# Patient Record
Sex: FEMALE | Race: ASIAN | NOT HISPANIC OR LATINO | Employment: UNEMPLOYED | URBAN - METROPOLITAN AREA
[De-identification: names, ages, dates, MRNs, and addresses within clinical notes are randomized per-mention and may not be internally consistent; named-entity substitution may affect disease eponyms.]

---

## 2017-03-22 ENCOUNTER — TELEPHONE (OUTPATIENT)
Dept: ENDOCRINOLOGY | Facility: CLINIC | Age: 42
End: 2017-03-22

## 2017-03-22 NOTE — TELEPHONE ENCOUNTER
Refill request rec'd, pt last seen 3/2015 pt contacted and stated she is seeing new provider in her area. Pharmacy notified to send to current provider Dr. Conteh

## 2017-03-22 NOTE — TELEPHONE ENCOUNTER
ACCU-CHEK NIKIA PLUS TEST STRP         Last Written Prescription Date: 3/27/2015  Last Fill Quantity: 3 box, # refills: 11 refills  Last Office Visit with [facility], P or [facility] prescribing provider: 3/27/2015       BP Readings from Last 3 Encounters:   03/27/15 119/80   12/19/14 119/71   10/02/14 131/82     Lab Results   Component Value Date    MICROL 1240 03/27/2015     No results found for: MICROALBUMIN  Creatinine   Date Value Ref Range Status   03/27/2015 0.52 0.52 - 1.04 mg/dL Final   ]  GFR Estimate   Date Value Ref Range Status   03/27/2015 >90  Non  GFR Calc   >60 mL/min/1.7m2 Final   09/26/2014 >90  Non  GFR Calc   >60 mL/min/1.7m2 Final   03/21/2014 >90 >60 mL/min/1.7m2 Final     GFR Estimate If Black   Date Value Ref Range Status   03/27/2015 >90   GFR Calc   >60 mL/min/1.7m2 Final   09/26/2014 >90   GFR Calc   >60 mL/min/1.7m2 Final   03/21/2014 >90 >60 mL/min/1.7m2 Final     Lab Results   Component Value Date    CHOL 261 03/27/2015     Lab Results   Component Value Date    HDL 33 03/27/2015     Lab Results   Component Value Date    LDL  03/27/2015     Cannot estimate LDL when triglyceride exceeds 400 mg/dL     Lab Results   Component Value Date    TRIG 758 03/27/2015     Lab Results   Component Value Date    CHOLHDLRATIO 7.9 03/27/2015     Lab Results   Component Value Date    AST 31 12/07/2012     Lab Results   Component Value Date    ALT 34 12/07/2012     Lab Results   Component Value Date    A1C 7.6 10/06/2013    A1C 7.5 05/03/2013    A1C 7.1 03/08/2013    A1C 6.8 12/14/2012    A1C 7.2 11/16/2012     Potassium   Date Value Ref Range Status   03/27/2015 3.5 3.4 - 5.3 mmol/L Final

## 2017-03-23 RX ORDER — BLOOD SUGAR DIAGNOSTIC
STRIP MISCELLANEOUS
Start: 2017-03-23

## 2017-03-23 NOTE — TELEPHONE ENCOUNTER
Routing refill request to provider for review/approval because:  Patient needs to be seen because it has been more than 1 year since last office visit.    Santa VIZCARRA RN, BSN

## 2017-04-05 ENCOUNTER — TELEPHONE (OUTPATIENT)
Dept: ENDOCRINOLOGY | Facility: CLINIC | Age: 42
End: 2017-04-05

## 2017-04-05 DIAGNOSIS — E11.9 TYPE 2 DIABETES MELLITUS WITHOUT COMPLICATION, UNSPECIFIED LONG TERM INSULIN USE STATUS: Primary | ICD-10-CM

## 2017-04-05 NOTE — TELEPHONE ENCOUNTER
Pt wants return visit - no longer on insulin - will send script for meter and test strips to local pharmacy and have her scheduled for return appt

## 2017-10-15 ENCOUNTER — HEALTH MAINTENANCE LETTER (OUTPATIENT)
Age: 42
End: 2017-10-15

## 2017-12-10 ENCOUNTER — TRANSFERRED RECORDS (OUTPATIENT)
Dept: HEALTH INFORMATION MANAGEMENT | Facility: CLINIC | Age: 42
End: 2017-12-10

## 2018-01-23 ENCOUNTER — OFFICE VISIT (OUTPATIENT)
Dept: URGENT CARE | Facility: URGENT CARE | Age: 43
End: 2018-01-23
Payer: COMMERCIAL

## 2018-01-23 DIAGNOSIS — E11.65 UNCONTROLLED TYPE 2 DIABETES MELLITUS WITH CHRONIC KIDNEY DISEASE, WITH LONG-TERM CURRENT USE OF INSULIN, UNSPECIFIED CKD STAGE: ICD-10-CM

## 2018-01-23 DIAGNOSIS — R10.9 ACUTE LEFT FLANK PAIN: Primary | ICD-10-CM

## 2018-01-23 DIAGNOSIS — R30.0 DYSURIA: ICD-10-CM

## 2018-01-23 DIAGNOSIS — E11.22 UNCONTROLLED TYPE 2 DIABETES MELLITUS WITH CHRONIC KIDNEY DISEASE, WITH LONG-TERM CURRENT USE OF INSULIN, UNSPECIFIED CKD STAGE: ICD-10-CM

## 2018-01-23 DIAGNOSIS — D64.9 ANEMIA, UNSPECIFIED TYPE: ICD-10-CM

## 2018-01-23 DIAGNOSIS — R80.9 PROTEINURIA, UNSPECIFIED TYPE: ICD-10-CM

## 2018-01-23 DIAGNOSIS — Z79.4 UNCONTROLLED TYPE 2 DIABETES MELLITUS WITH CHRONIC KIDNEY DISEASE, WITH LONG-TERM CURRENT USE OF INSULIN, UNSPECIFIED CKD STAGE: ICD-10-CM

## 2018-01-23 LAB
ALBUMIN UR-MCNC: >=300 MG/DL
ANISOCYTOSIS BLD QL SMEAR: SLIGHT
APPEARANCE UR: ABNORMAL
BACTERIA #/AREA URNS HPF: ABNORMAL /HPF
BASOPHILS # BLD AUTO: 0.2 10E9/L (ref 0–0.2)
BASOPHILS NFR BLD AUTO: 1 %
BILIRUB UR QL STRIP: NEGATIVE
COLOR UR AUTO: YELLOW
DIFFERENTIAL METHOD BLD: ABNORMAL
ELLIPTOCYTES BLD QL SMEAR: ABNORMAL
EOSINOPHIL # BLD AUTO: 0.2 10E9/L (ref 0–0.7)
EOSINOPHIL NFR BLD AUTO: 1 %
ERYTHROCYTE [DISTWIDTH] IN BLOOD BY AUTOMATED COUNT: 17.3 % (ref 10–15)
GLUCOSE UR STRIP-MCNC: >=1000 MG/DL
HCT VFR BLD AUTO: 31.2 % (ref 35–47)
HGB BLD-MCNC: 8.7 G/DL (ref 11.7–15.7)
HGB UR QL STRIP: ABNORMAL
KETONES UR STRIP-MCNC: NEGATIVE MG/DL
LEUKOCYTE ESTERASE UR QL STRIP: NEGATIVE
LYMPHOCYTES # BLD AUTO: 2.1 10E9/L (ref 0.8–5.3)
LYMPHOCYTES NFR BLD AUTO: 13 %
MCH RBC QN AUTO: 17.5 PG (ref 26.5–33)
MCHC RBC AUTO-ENTMCNC: 27.9 G/DL (ref 31.5–36.5)
MCV RBC AUTO: 63 FL (ref 78–100)
MONOCYTES # BLD AUTO: 1.1 10E9/L (ref 0–1.3)
MONOCYTES NFR BLD AUTO: 7 %
NEUTROPHILS # BLD AUTO: 12.7 10E9/L (ref 1.6–8.3)
NEUTROPHILS NFR BLD AUTO: 78 %
NITRATE UR QL: NEGATIVE
NON-SQ EPI CELLS #/AREA URNS LPF: ABNORMAL /LPF
PH UR STRIP: 6 PH (ref 5–7)
PLATELET # BLD AUTO: 384 10E9/L (ref 150–450)
PLATELET # BLD EST: ABNORMAL 10*3/UL
POIKILOCYTOSIS BLD QL SMEAR: SLIGHT
RBC # BLD AUTO: 4.96 10E12/L (ref 3.8–5.2)
RBC #/AREA URNS AUTO: ABNORMAL /HPF
RBC INCLUSIONS BLD: SLIGHT
SOURCE: ABNORMAL
SP GR UR STRIP: 1.01 (ref 1–1.03)
UROBILINOGEN UR STRIP-ACNC: 0.2 EU/DL (ref 0.2–1)
WBC # BLD AUTO: 16.3 10E9/L (ref 4–11)
WBC #/AREA URNS AUTO: ABNORMAL /HPF
WBC CLUMPS #/AREA URNS HPF: PRESENT /HPF

## 2018-01-23 PROCEDURE — 99214 OFFICE O/P EST MOD 30 MIN: CPT | Performed by: PHYSICIAN ASSISTANT

## 2018-01-23 PROCEDURE — 85025 COMPLETE CBC W/AUTO DIFF WBC: CPT | Performed by: PHYSICIAN ASSISTANT

## 2018-01-23 PROCEDURE — 81001 URINALYSIS AUTO W/SCOPE: CPT | Performed by: PHYSICIAN ASSISTANT

## 2018-01-23 PROCEDURE — 36415 COLL VENOUS BLD VENIPUNCTURE: CPT | Performed by: PHYSICIAN ASSISTANT

## 2018-01-23 PROCEDURE — 87086 URINE CULTURE/COLONY COUNT: CPT | Performed by: PHYSICIAN ASSISTANT

## 2018-01-23 NOTE — MR AVS SNAPSHOT
After Visit Summary   1/23/2018    Jesus Alberto Denson    MRN: [id number]           Patient Information     Date Of Birth          1975        Visit Information        Provider Department      1/23/2018 5:35 PM Anamaria Paz PA-C [facility]        Today's Diagnoses     Acute left flank pain    -  1       Follow-ups after your visit        Who to contact     If you have questions or need follow up information about today's clinic visit or your schedule please contact [facility] directly at [phone].  Normal or non-critical lab and imaging results will be communicated to you by MyChart, letter or phone within 4 business days after the clinic has received the results. If you do not hear from us within 7 days, please contact the clinic through O2 Secure Wirelesst or phone. If you have a critical or abnormal lab result, we will notify you by phone as soon as possible.  Submit refill requests through Tapstream or call your pharmacy and they will forward the refill request to us. Please allow 3 business days for your refill to be completed.          Additional Information About Your Visit        MyChart Information     Tapstream gives you secure access to your electronic health record. If you see a primary care provider, you can also send messages to your care team and make appointments. If you have questions, please call your primary care clinic.  If you do not have a primary care provider, please call [phone] and they will assist you.        Care EveryWhere ID     This is your Care EveryWhere ID. This could be used by other organizations to access your [facility] medical records  [id number]        Your Vitals Were     Pulse Temperature Pulse Oximetry BMI (Body Mass Index)          112 100.7  F (38.2  C) (Oral) 100% 26.6 kg/m2         Blood Pressure from Last 3 Encounters:   01/23/18 136/85   03/27/15 119/80   12/19/14 119/71    Weight from Last 3 Encounters:   01/23/18 140 lb 12.8 oz  (63.9 kg)   03/27/15 162 lb (73.5 kg)   12/19/14 164 lb (74.4 kg)              We Performed the Following     CBC with platelets differential     UA with Microscopic reflex to Culture        Primary Care Provider Office Phone # Fax #    Edilia iXe [phone] [address] [place] [address]        Equal Access to Services     [facility] Center[facility] : Hadii aad ku hadasho Soomaali, waaxda luqadaha, qaybta kaalmada adeegyada, waxay idiin hayaan adeeg kharash la'aan ah. So [facility] [phone].    ATENCIÓN: Si habla espcorona, tiene a mcdowell disposición servicios gratuitos de asistencia lingüística. Llame al [phone].    We comply with applicable federal civil rights laws and [place] laws. We do not discriminate on the basis of race, color, national origin, age, disability, sex, sexual orientation, or gender identity.            Thank you!     Thank you for choosing [facility]  for your care. Our goal is always to provide you with excellent care. Hearing back from our patients is one way we can continue to improve our services. Please take a few minutes to complete the written survey that you may receive in the mail after your visit with us. Thank you!             Your Updated Medication List - Protect others around you: Learn how to safely use, store and throw away your medicines at www.disposemymeds.org.          This list is accurate as of: 1/23/18  7:18 PM.  Always use your most recent med list.                   Brand Name Dispense Instructions for use Diagnosis    blood glucose monitoring lancets     2 Box    Use to test blood sugar 2 times daily    Diabetes mellitus, type 2 (H)       blood glucose monitoring test strip    no brand specified    3 Box    Pt to use twice daily, 5 times per week  - accucheck aster    Type 2 diabetes mellitus without complication, unspecified long term insulin use status (H)       cholecalciferol 24014 UNITS capsule    VITAMIN D3    12 capsule     Take 1 capsule (50,000 Units) by mouth once a week    Unspecified vitamin D deficiency       ferrous sulfate 325 (65 FE) MG tablet    IRON    180 tablet    1 tablet twice daily    Type 2 diabetes, HbA1C goal < 8% (H)       HumaLOG MIX 75/25 KWIKPEN injection   Generic drug:  insulin lispro prot & lispro     60 mL    Pt to take 35 units in AM before breakfast and 18 units prior to supper    Type 2 diabetes, uncontrolled, with renal manifestation (H)       insulin pen needle 31G X 5 MM     200 each    2 times per day    Type 2 diabetes, uncontrolled, with renal manifestation (H)

## 2018-01-24 ENCOUNTER — TELEPHONE (OUTPATIENT)
Dept: FAMILY MEDICINE | Facility: CLINIC | Age: 43
End: 2018-01-24

## 2018-01-24 VITALS
OXYGEN SATURATION: 100 % | WEIGHT: 140.8 LBS | TEMPERATURE: 100.7 F | HEART RATE: 102 BPM | BODY MASS INDEX: 26.6 KG/M2 | SYSTOLIC BLOOD PRESSURE: 136 MMHG | DIASTOLIC BLOOD PRESSURE: 85 MMHG

## 2018-01-24 DIAGNOSIS — N20.0 CALCULUS OF KIDNEY: Primary | ICD-10-CM

## 2018-01-24 LAB
BACTERIA SPEC CULT: NORMAL
SPECIMEN SOURCE: NORMAL

## 2018-01-24 ASSESSMENT — ENCOUNTER SYMPTOMS
FEVER: 1
EYE PAIN: 0
HEMOPTYSIS: 0
DYSURIA: 1
FREQUENCY: 1
DIAPHORESIS: 0
FLANK PAIN: 1
HEMATURIA: 0
CARDIOVASCULAR NEGATIVE: 1
WEIGHT LOSS: 0
PALPITATIONS: 0
RESPIRATORY NEGATIVE: 1
COUGH: 0

## 2018-01-24 NOTE — PROGRESS NOTES
SUBJECTIVE:                                                         LENORE Denson is a 42 year old female who presents to clinic today:  URINARY TRACT SYMPTOMS    Duration: 2days ago    Description:  Dysuria, frequency, urgency and hesitancy.  No hematuria    Intensity:  moderate    Accompanying signs and symptoms:  Fever/chills: Yes, fevers   Flank pain: Yes, L side low back.  Describes some radiation in to her LLE but no numbness, tingling or weakness.  No bladder or bowel dysfunction.  No swelling, redness, drainage or fevers.  No injury or trauma.    Nausea and vomiting: no   Vaginal symptoms: No vaginal d/c, bleeding, and irritation.  No new partners.   Abdominal/Pelvic Pain: No abdominal pain, n/v, constipation, diarrhea, bloody or black tarry stools.  No URI symptoms.    History  History of frequent UTI's: None  History of kidney stones: no   Sexually Active: no   Possibility of pregnancy: No    Precipitating or alleviating factors: None    Therapies tried and outcome: fluids without any relief.  Has known hx of poorly controlled diabetes and CKD currently on insulin      Reviewed PMH.  Patient Active Problem List   Diagnosis     Obesity     Hyperlipidemia with target LDL less than 100     CKD (chronic kidney disease) stage 1, GFR 90 ml/min or greater     Type 2 diabetes, uncontrolled, with renal manifestation (H)     Anemia     Vitamin D deficiency     Proteinuria     Iron deficiency     Current Outpatient Prescriptions   Medication Sig Dispense Refill     blood glucose monitoring (NO BRAND SPECIFIED) test strip Pt to use twice daily, 5 times per week  - accucheck aster 3 Box 11     HUMALOG MIX 75/25 KWIKPEN (75-25) 100 UNIT/ML susp Pt to take 35 units in AM before breakfast and 18 units prior to supper 60 mL 3     blood glucose monitoring (ACCU-CHEK FASTCLIX) lancets Use to test blood sugar 2 times daily 2 Box 3     insulin pen needle 31G X 5 MM 2 times per day 200 each 3     cholecalciferol (VITAMIN  D3) 11471 UNITS capsule Take 1 capsule (50,000 Units) by mouth once a week 12 capsule 0     ferrous sulfate (IRON) 325 (65 FE) MG tablet 1 tablet twice daily (Patient not taking: Reported on 1/23/2018) 180 tablet 1     Allergies   Allergen Reactions     Nkda [No Known Drug Allergies]        Review of Systems   Constitutional: Positive for fever. Negative for diaphoresis and weight loss.   HENT: Negative.    Eyes: Negative for pain.   Respiratory: Negative.  Negative for cough and hemoptysis.    Cardiovascular: Negative.  Negative for chest pain and palpitations.   Genitourinary: Positive for dysuria, flank pain, frequency and urgency. Negative for hematuria.   Skin: Negative.    Endo/Heme/Allergies: Negative for environmental allergies.   All other systems reviewed and are negative.      /85  Pulse 102  Temp 100.7  F (38.2  C) (Oral)  Wt 140 lb 12.8 oz (63.9 kg)  SpO2 100%  BMI 26.6 kg/m2  Physical Exam   Constitutional: She is oriented to person, place, and time and well-developed, well-nourished, and in no distress. No distress.   Cardiovascular: Normal rate, regular rhythm, normal heart sounds and intact distal pulses.  Exam reveals no gallop and no friction rub.    No murmur heard.  Pulmonary/Chest: Effort normal and breath sounds normal. No respiratory distress. She has no wheezes. She has no rales.   Abdominal: Soft. Normal appearance, normal aorta and bowel sounds are normal. She exhibits no mass. There is no hepatosplenomegaly. There is tenderness (L sided flank pain). There is rebound and guarding. There is no CVA tenderness, no tenderness at McBurney's point and negative Marinelli's sign. No hernia.   Musculoskeletal:        Lumbar back: She exhibits normal range of motion, no tenderness, no bony tenderness, no swelling, no edema, no deformity, no laceration, no spasm and normal pulse.   Neurological: She is alert and oriented to person, place, and time. She has normal motor skills, normal  sensation, normal strength and normal reflexes. She has a normal Straight Leg Raise Test. Gait normal. Gait normal.   Skin: Skin is warm, dry and intact.   Distal pulses are 2+ and symmetric.  No peripheral edema.   Psychiatric: Mood, memory, affect and judgment normal.   Nursing note and vitals reviewed.  UA RESULTS:  Recent Labs   Lab Test  01/23/18   1906   COLOR  Yellow   APPEARANCE  Slightly Cloudy   URINEGLC  >=1000*   URINEBILI  Negative   URINEKETONE  Negative   SG  1.015   UBLD  Small*   URINEPH  6.0   PROTEIN  >=300*   UROBILINOGEN  0.2   NITRITE  Negative   LEUKEST  Negative   RBCU  O - 2   WBCU  5-10*, clumps         Assessment/Plan:  Acute left flank pain:  WBC is elevated at 16.3 with left shift.  UA and micro are abnormal and is suspicious for pyelonephritis vs UTI vs sepsis vs kidney stones/obstructive uropathy.  Due to her complicated medical hx of poorly controlled diabetes and CKD, I recommended further evaluation and management in the ER.  Will most likely need further workup with labs, imaging, and IV antibiotics.  Patient has declined transportation via ambulance and will have family drive her.  Understands risks and benefits of ambulance transfer and she has declined.  She is stable in clinic.  Call 911 if worsening symptoms.  [facility] has been notified.  F/u with PCP after ER visit.     -     CBC with platelets differential  -     UA with Microscopic reflex to Culture    Anemia:  Hgb has dropped to 8.7 with microcytic anemia (baseline looks to be 9-10).  ?secondary to CKD.  Will have them manage in the ER as well.      Dysuria  -     Urine Culture Aerobic Bacterial    Uncontrolled type 2 diabetes mellitus with chronic kidney disease, with long-term current use of insulin, unspecified CKD stage (H)    Proteinuria, unspecified type          Anamaria See PATIENCE Paz      Chart reviewed.  Encounter was reviewed with provider after the visit.  Patient was not examined by me.  Syeda Rich,  M.D.

## 2018-01-24 NOTE — TELEPHONE ENCOUNTER
Called to check on patient's status.  Was referred to the ER last night due to concern for pyelo vs kidney stones.  Was found to have a kidney stone on her L side and ESLW was recommended which patient has refused at this time.  Would like to try herbal and conservative treatments.  Was started on cipro 500mg twice daily X2weeks with good improvement as well.  Increase fluids and will send to urology for further evaluation and management.  F/u with PCP in 1week.    Anamaria Paz PA-C

## 2018-02-07 ENCOUNTER — OFFICE VISIT (OUTPATIENT)
Dept: FAMILY MEDICINE | Facility: CLINIC | Age: 43
End: 2018-02-07
Payer: COMMERCIAL

## 2018-02-07 ENCOUNTER — TELEPHONE (OUTPATIENT)
Dept: FAMILY MEDICINE | Facility: CLINIC | Age: 43
End: 2018-02-07

## 2018-02-07 VITALS
HEART RATE: 87 BPM | DIASTOLIC BLOOD PRESSURE: 75 MMHG | SYSTOLIC BLOOD PRESSURE: 125 MMHG | WEIGHT: 140 LBS | TEMPERATURE: 97.1 F | OXYGEN SATURATION: 100 % | BODY MASS INDEX: 26.45 KG/M2

## 2018-02-07 DIAGNOSIS — E11.22 UNCONTROLLED TYPE 2 DIABETES MELLITUS WITH CHRONIC KIDNEY DISEASE, WITH LONG-TERM CURRENT USE OF INSULIN, UNSPECIFIED CKD STAGE: Primary | ICD-10-CM

## 2018-02-07 DIAGNOSIS — N18.1 CKD (CHRONIC KIDNEY DISEASE) STAGE 1, GFR 90 ML/MIN OR GREATER: ICD-10-CM

## 2018-02-07 DIAGNOSIS — Z79.4 UNCONTROLLED TYPE 2 DIABETES MELLITUS WITH CHRONIC KIDNEY DISEASE, WITH LONG-TERM CURRENT USE OF INSULIN, UNSPECIFIED CKD STAGE: Primary | ICD-10-CM

## 2018-02-07 DIAGNOSIS — E11.65 UNCONTROLLED TYPE 2 DIABETES MELLITUS WITH CHRONIC KIDNEY DISEASE, WITH LONG-TERM CURRENT USE OF INSULIN, UNSPECIFIED CKD STAGE: Primary | ICD-10-CM

## 2018-02-07 DIAGNOSIS — E78.5 HYPERLIPIDEMIA WITH TARGET LDL LESS THAN 100: ICD-10-CM

## 2018-02-07 DIAGNOSIS — N20.0 CALCULUS OF KIDNEY: ICD-10-CM

## 2018-02-07 LAB
ALBUMIN SERPL-MCNC: 3.2 G/DL (ref 3.4–5)
ANION GAP SERPL CALCULATED.3IONS-SCNC: 9 MMOL/L (ref 3–14)
BUN SERPL-MCNC: 13 MG/DL (ref 7–30)
CALCIUM SERPL-MCNC: 8.8 MG/DL (ref 8.5–10.1)
CHLORIDE SERPL-SCNC: 95 MMOL/L (ref 94–109)
CHOLEST SERPL-MCNC: 193 MG/DL
CO2 SERPL-SCNC: 26 MMOL/L (ref 20–32)
CREAT SERPL-MCNC: 0.55 MG/DL (ref 0.52–1.04)
CREAT UR-MCNC: 18 MG/DL
GFR SERPL CREATININE-BSD FRML MDRD: >90 ML/MIN/1.7M2
GLUCOSE SERPL-MCNC: 379 MG/DL (ref 70–99)
HBA1C MFR BLD: 11.3 % (ref 4.3–6)
HDLC SERPL-MCNC: 35 MG/DL
LDLC SERPL CALC-MCNC: 84 MG/DL
MICROALBUMIN UR-MCNC: 127 MG/L
MICROALBUMIN/CREAT UR: 721.59 MG/G CR (ref 0–25)
NONHDLC SERPL-MCNC: 158 MG/DL
PHOSPHATE SERPL-MCNC: 2.9 MG/DL (ref 2.5–4.5)
POTASSIUM SERPL-SCNC: 3.7 MMOL/L (ref 3.4–5.3)
SODIUM SERPL-SCNC: 130 MMOL/L (ref 133–144)
TRIGL SERPL-MCNC: 369 MG/DL

## 2018-02-07 PROCEDURE — 82043 UR ALBUMIN QUANTITATIVE: CPT | Performed by: INTERNAL MEDICINE

## 2018-02-07 PROCEDURE — 83036 HEMOGLOBIN GLYCOSYLATED A1C: CPT | Performed by: INTERNAL MEDICINE

## 2018-02-07 PROCEDURE — 80061 LIPID PANEL: CPT | Performed by: INTERNAL MEDICINE

## 2018-02-07 PROCEDURE — 99214 OFFICE O/P EST MOD 30 MIN: CPT | Performed by: INTERNAL MEDICINE

## 2018-02-07 PROCEDURE — 80069 RENAL FUNCTION PANEL: CPT | Performed by: INTERNAL MEDICINE

## 2018-02-07 PROCEDURE — 36415 COLL VENOUS BLD VENIPUNCTURE: CPT | Performed by: INTERNAL MEDICINE

## 2018-02-07 NOTE — TELEPHONE ENCOUNTER
Left message on voicemail for patient to call back.   Direct number given to call back: [phone].     Yi Ness RN

## 2018-02-07 NOTE — MR AVS SNAPSHOT
After Visit Summary   2/7/2018    Jesus Alberto Denson    MRN: [id number]           Patient Information     Date Of Birth          1975        Visit Information        Provider Department      2/7/2018 9:20 AM Chata Giordano MD [facility]        Today's Diagnoses     Uncontrolled type 2 diabetes mellitus with chronic kidney disease, with long-term current use of insulin, unspecified CKD stage (H)    -  1    CKD (chronic kidney disease) stage 1, GFR 90 ml/min or greater        Hyperlipidemia with target LDL less than 100        Calculus of kidney           Follow-ups after your visit        Additional Services     DIABETES EDUCATOR REFERRAL       DIABETES SELF MANAGEMENT TRAINING (DSMT)      Your provider has referred you to Diabetes Education: [facility]: Diabetes Education - All [facility] [phone]   [email or web address]     If an urgent visit is needed or A1C is above 12, Care Team to call the Diabetes  Education Team at [phone] or send an In Basket message to the Diabetes Education Pool (P DIAB ED-PATIENT CARE).    A  will call you to make your appointment. If it has been more than 3 business days since your referral was placed, please call the above phone number to schedule.    Type of training and number of hours: Previous Diagnosis: Follow-up DSMT - 2 hours.    Medicare covers: 10 hours of initial DSMT in 12 month period from the time of first visit, plus 2 hours of follow-up DSMT annually, and additional hours as requested for insulin training.    Diabetes Type: Type 2 , had been on insulin, stopped taking any meds for two years.  Restarting on oral meds now.               Diabetes Co-Morbidities: none               A1C Goal:  <7.0       A1C is: Lab Results       Component                Value               Date                       A1C                      7.6                 10/06/2013              Diabetes  Education Topics: Comprehensive Knowledge Assessment and Instruction    Special Educational Needs Requiring Individual DSMT: None       MEDICAL NUTRITION THERAPY (MNT) for Diabetes    Medical Nutrition Therapy with a Registered Dietitian can be provided in coordination with Diabetes Self-Management Training to assist in achieving optimal diabetes management.    MNT Type and Hours: Previous diagnosis: Annual follow-up MNT - 2 hours                       Medicare will cover: 3 hours initial MNT in 12 month period after first visit, plus 2 hours of follow-up MNT annually    Please be aware that coverage of these services is subject to the terms and limitations of your health insurance plan.  Call member services at your health plan to determine Diabetes Self-Management Training (Codes  &amp; ) and Medical Nutrition Therapy (Codes 30538 & 92482) benefits and ask which blood glucose monitor brands are covered by your plan.  Please bring the following with you to your appointment:    (1)  List of current medications   (2)  List of Blood Glucose Monitor brands that are covered by your insurance plan  (3)  Blood Glucose Monitor and log book  (4)   Food records for the 3 days prior to your visit    The Certified Diabetes Educator may make diabetes medication adjustments per the CDE Protocol and Collaborative Practice Agreement.            UROLOGY ADULT REFERRAL       Your provider has referred you to: [facility]: [facility] [phone]   [email or web address]    Please be aware that coverage of these services is subject to the terms and limitations of your health insurance plan.  Call member services at your health plan with any benefit or coverage questions.      Please bring the following with you to your appointment:    (1) Any X-Rays, CTs or MRIs which have been performed.  Contact the facility where they were done to arrange for  prior to your  scheduled appointment.    (2) List of current medications  (3) This referral request   (4) Any documents/labs given to you for this referral                  Follow-up notes from your care team     Return in about 1 week (around 2/14/2018) for follow up with primary doctor.      Who to contact     If you have questions or need follow up information about today's clinic visit or your schedule please contact [facility] directly at [phone].  Normal or non-critical lab and imaging results will be communicated to you by GREEhart, letter or phone within 4 business days after the clinic has received the results. If you do not hear from us within 7 days, please contact the clinic through Infinia or phone. If you have a critical or abnormal lab result, we will notify you by phone as soon as possible.  Submit refill requests through Infinia or call your pharmacy and they will forward the refill request to us. Please allow 3 business days for your refill to be completed.          Additional Information About Your Visit        Infinia Information     Infinia gives you secure access to your electronic health record. If you see a primary care provider, you can also send messages to your care team and make appointments. If you have questions, please call your primary care clinic.  If you do not have a primary care provider, please call [phone] and they will assist you.        Care EveryWhere ID     This is your Care EveryWhere ID. This could be used by other organizations to access your [place] medical records  [id number]        Your Vitals Were     Pulse Temperature Pulse Oximetry BMI (Body Mass Index)          87 97.1  F (36.2  C) (Oral) 100% 26.45 kg/m2         Blood Pressure from Last 3 Encounters:   02/07/18 125/75   01/23/18 136/85   03/27/15 119/80    Weight from Last 3 Encounters:   02/07/18 140 lb (63.5 kg)   01/23/18 140 lb 12.8 oz (63.9 kg)   03/27/15 162 lb (73.5 kg)              We Performed  the Following     Albumin Random Urine Quantitative with Creat Ratio     DIABETES EDUCATOR REFERRAL     Hemoglobin A1c     Lipid panel reflex to direct LDL Non-fasting     Renal panel     UROLOGY ADULT REFERRAL          Today's Medication Changes          These changes are accurate as of 2/7/18  9:48 AM.  If you have any questions, ask your nurse or doctor.               Start taking these medicines.        Dose/Directions    metFORMIN 1000 MG tablet   Commonly known as:  GLUCOPHAGE   Used for:  Uncontrolled type 2 diabetes mellitus with chronic kidney disease, with long-term current use of insulin, unspecified CKD stage (H)   Started by:  Chata Giordano MD        Dose:  1000 mg   Take 1 tablet (1,000 mg) by mouth 2 times daily (with meals)   Quantity:  60 tablet   Refills:  1       order for DME   Used for:  Uncontrolled type 2 diabetes mellitus with chronic kidney disease, with long-term current use of insulin, unspecified CKD stage (H)   Started by:  Chata Giordano MD        Equipment being ordered: glucometer with lancets and strips to check sugars tid   Quantity:  1 each   Refills:  1            Where to get your medicines      These medications were sent to [facility]/pharmacy #[id number] - [place], [place] - [address],  [address]  [address] [address]     Phone:  [phone]     metFORMIN 1000 MG tablet         Some of these will need a paper prescription and others can be bought over the counter.  Ask your nurse if you have questions.     Bring a paper prescription for each of these medications     order for DME                Primary Care Provider Office Phone # Fax #    Edilia Xie [phone] [address] [address]        Equal Access to Services     [facility] AH: Gema Valdes, warochelle luqadaha, qaybta kacuong marquis . So [facility]  [phone].    ATENCIÓN: Si harsh garcia, tiene a mcdowell disposición servicios gratuitos de asistencia lingüística. Amadeo olvera [phone].    We comply with applicable federal civil rights laws and [place] laws. We do not discriminate on the basis of race, color, national origin, age, disability, sex, sexual orientation, or gender identity.            Thank you!     Thank you for choosing [facility]  [facility] your [facility]. Our goal is always to provide you with excellent care. Hearing back from our patients is one way we can continue to improve our services. Please take a few minutes to complete the written survey that you may receive in the mail after your visit with us. Thank you!             Your Updated Medication List - Protect others around you: Learn how to safely use, store and throw away your medicines at www.disposemymeds.org.          This list is accurate as of 2/7/18  9:48 AM.  Always use your most recent med list.                   Brand Name Dispense Instructions for use Diagnosis    blood glucose monitoring lancets     2 Box    Use to test blood sugar 2 times daily    Diabetes mellitus, type 2 (H)       blood glucose monitoring test strip    no brand specified    3 Box    Pt to use twice daily, 5 times per week  - accucheck aster    Type 2 diabetes mellitus without complication, unspecified long term insulin use status (H)       cholecalciferol 29755 UNITS capsule    VITAMIN D3    12 capsule    Take 1 capsule (50,000 Units) by mouth once a week    Unspecified vitamin D deficiency       ferrous sulfate 325 (65 FE) MG tablet    IRON    180 tablet    1 tablet twice daily    Type 2 diabetes, HbA1C goal < 8% (H)       HumaLOG MIX 75/25 KWIKPEN injection   Generic drug:  insulin lispro prot & lispro     60 mL    Pt to take 35 units in AM before breakfast and 18 units prior to supper    Type 2 diabetes, uncontrolled, with renal manifestation (H)       insulin pen needle 31G X 5 MM     200 each    2  times per day    Type 2 diabetes, uncontrolled, with renal manifestation (H)       metFORMIN 1000 MG tablet    GLUCOPHAGE    60 tablet    Take 1 tablet (1,000 mg) by mouth 2 times daily (with meals)    Uncontrolled type 2 diabetes mellitus with chronic kidney disease, with long-term current use of insulin, unspecified CKD stage (H)       order for DME     1 each    Equipment being ordered: glucometer with lancets and strips to check sugars tid    Uncontrolled type 2 diabetes mellitus with chronic kidney disease, with long-term current use of insulin, unspecified CKD stage (H)

## 2018-02-07 NOTE — Clinical Note
PELON - This pt has an appt with you next week to establish care and manage DM.  She was previously on insulin for DMT2, but hasn't taken ANY meds or insulin for DM for at least two years.  I ordered labs and expect her glucose and A1C to be quite high.  I've scheduled her with DM ed, ordered her a glucometer and advised her to start checking sugars.  Started her on metformin 1000 bid today, but she will need more than this.  She is not wanting to be on insulin again, but I told her that might be something she needs.  (Also recent kidney stone and has f/u with urology.)

## 2018-02-07 NOTE — TELEPHONE ENCOUNTER
Notes Recorded by Chata Giordano MD on 2/7/2018 at 2:24 PM    Call pt with results.    Glucose and Hgb A1C are quite high.  Keep upcoming appts with DM ed and with Dr. Flores next week.  Take the metformin prescribed.  Check sugars twice a day and write them down and bring them in to appts.  Urine also shows effects of DM on the kidneys, so she is to work with pcp to get DM under control.

## 2018-02-07 NOTE — PROGRESS NOTES
SUBJECTIVE:  Jesus Alberto Denson is an 42 year old female who presents for diabetes.  Has had diabetes for about five years.  Currently not taking any pills or insulin.  Had been on pills for diabetes and when pregnant four years ago was placed on insulin and never went back to pills.  Hasn't taken any pills or insulin for about two years now.  Sometimes feels thirsty.  Urinates sometimes frequently.  No changes in weight.  Feels tired sometimes.  No fevers.  No n/v/d.  Urine looks normal.       has a past medical history of CKD (chronic kidney disease) stage 1, GFR 90 ml/min or greater; DM mellitus, gestational; Hyperlipidemia; Obesity (8/13/2012); and Type 2 diabetes, HbA1c goal < 7% (H) (3/11/2013).  Social History     Social History     Marital status:      Spouse name: Edinson     Number of children: 8     Years of education: 12     Occupational History      Homemaker     Social History Main Topics     Smoking status: Never Smoker     Smokeless tobacco: Never Used      Comment: non-smoking household     Alcohol use No     Drug use: No     Sexual activity: Yes     Partners: Male     Other Topics Concern     Parent/Sibling W/ Cabg, Mi Or Angioplasty Before 65f 55m? No     Social History Narrative     Family History   Problem Relation Age of Onset     DIABETES Mother      CANCER No family hx of      C.A.D. No family hx of      Hypertension No family hx of      CEREBROVASCULAR DISEASE No family hx of      Thyroid Disease No family hx of      Glaucoma No family hx of      Macular Degeneration No family hx of        ALLERGIES:  Nkda [no known drug allergies]    Current Outpatient Prescriptions   Medication     blood glucose monitoring (NO BRAND SPECIFIED) test strip     HUMALOG MIX 75/25 KWIKPEN (75-25) 100 UNIT/ML susp     blood glucose monitoring (ACCU-CHEK FASTCLIX) lancets     insulin pen needle 31G X 5 MM     cholecalciferol (VITAMIN D3) 17437 UNITS capsule     ferrous sulfate (IRON) 325 (65 FE) MG tablet     No  current facility-administered medications for this visit.          ROS:  ROS is done and is negative for general, constitutional, eye, ENT, Respiratory, cardiovascular, GI, , Skin, musculoskeletal except as noted elsewhere.      OBJECTIVE:  /75  Pulse 87  Temp 97.1  F (36.2  C) (Oral)  Wt 140 lb (63.5 kg)  SpO2 100%  BMI 26.45 kg/m2  GENERAL APPEARANCE: Alert, in no acute distress  EYES: normal  NOSE:normal  OROPHARYNX:normal  NECK:No adenopathy,masses or thyromegaly  RESP: normal and clear to auscultation  CV:regular rate and rhythm and no murmurs, clicks, or gallops  ABDOMEN: Abdomen soft, non-tender. BS normal. No masses, organomegaly  SKIN: no ulcers, lesions or rash  MUSCULOSKELETAL:Musculoskeletal normal      RESULTS  Results for orders placed or performed in visit on 01/23/18   CBC with platelets differential   Result Value Ref Range    WBC 16.3 (H) 4.0 - 11.0 10e9/L    RBC Count 4.96 3.8 - 5.2 10e12/L    Hemoglobin 8.7 (L) 11.7 - 15.7 g/dL    Hematocrit 31.2 (L) 35.0 - 47.0 %    MCV 63 (L) 78 - 100 fl    MCH 17.5 (L) 26.5 - 33.0 pg    MCHC 27.9 (L) 31.5 - 36.5 g/dL    RDW 17.3 (H) 10.0 - 15.0 %    Platelet Count 384 150 - 450 10e9/L    % Neutrophils 78.0 %    % Lymphocytes 13.0 %    % Monocytes 7.0 %    % Eosinophils 1.0 %    % Basophils 1.0 %    Absolute Neutrophil 12.7 (H) 1.6 - 8.3 10e9/L    Absolute Lymphocytes 2.1 0.8 - 5.3 10e9/L    Absolute Monocytes 1.1 0.0 - 1.3 10e9/L    Absolute Eosinophils 0.2 0.0 - 0.7 10e9/L    Absolute Basophils 0.2 0.0 - 0.2 10e9/L    Anisocytosis Slight     Poikilocytosis Slight     RBC Fragments Slight     Elliptocytes Moderate     Platelet Estimate       Automated count confirmed.  Platelet morphology is normal.    Diff Method Automated Method    UA with Microscopic reflex to Culture   Result Value Ref Range    Color Urine Yellow     Appearance Urine Slightly Cloudy     Glucose Urine >=1000 (A) NEG^Negative mg/dL    Bilirubin Urine Negative NEG^Negative     Ketones Urine Negative NEG^Negative mg/dL    Specific Gravity Urine 1.015 1.003 - 1.035    pH Urine 6.0 5.0 - 7.0 pH    Protein Albumin Urine >=300 (A) NEG^Negative mg/dL    Urobilinogen Urine 0.2 0.2 - 1.0 EU/dL    Nitrite Urine Negative NEG^Negative    Blood Urine Small (A) NEG^Negative    Leukocyte Esterase Urine Negative NEG^Negative    Source Midstream Urine     WBC Urine 5-10 (A) OTO2^O - 2 /HPF    RBC Urine O - 2 OTO2^O - 2 /HPF    WBC Clumps Present (A) NEG^Negative /HPF    Squamous Epithelial /LPF Urine Few FEW^Few /LPF    Bacteria Urine Few (A) NEG^Negative /HPF   Urine Culture Aerobic Bacterial   Result Value Ref Range    Specimen Description Midstream Urine     Culture Micro       50,000 to 100,000 colonies/mL  mixed urogenital azalea     .  No results found for this or any previous visit (from the past 48 hour(s)).    ASSESSMENT/PLAN:    ASSESSMENT / PLAN:  (E11.22,  Z79.4,  E11.65) Uncontrolled type 2 diabetes mellitus with chronic kidney disease, with long-term current use of insulin, unspecified CKD stage (H)  (primary encounter diagnosis)  Comment: pt has not been on any medication for diabetes for at least two years.  Previously required insulin, but she does not want to be on insulin.  Plan: DIABETES EDUCATOR REFERRAL, Hemoglobin A1c,         Lipid panel reflex to direct LDL Non-fasting,         Albumin Random Urine Quantitative with Creat         Ratio, Renal panel, order for DME, metFORMIN         (GLUCOPHAGE) 1000 MG tablet        Check labs - anticipate glucose and hgbA1C will be quite high based on hx.  Start glucophage now.  F/u with a pcp next week and referred to DM ed.  Discussed with pt that it is very important to get her DM under control, which may require insulin, depending on how things go.  appts for DM ed and for pcp scheduled for pt before she left today.  rx for glucometer and lancets and strips given today and pt to start checking sugars at least twice a day until meets with DM  ed and pcp.    (N18.1) CKD (chronic kidney disease) stage 1, GFR 90 ml/min or greater  Comment: has h/o CKD due to DM.  Plan: Albumin Random Urine Quantitative with Creat         Ratio, Renal panel        Check labs today. F/u with pcp. Start meds for management of very poorly controlled DM    (E78.5) Hyperlipidemia with target LDL less than 100  Comment:   Plan: Lipid panel reflex to direct LDL Non-fasting        Check labs today.  Pt hasn't taken any meds for two years including for DM.  Await labs and f/u with pcp next week. Reviewed diet and exercise.    (N20.0) Calculus of kidney  Comment: recent h/o.  They were told to f/u with urology, but have not done so  Plan: UROLOGY ADULT REFERRAL        Pt scheduled for urology appt today before leaving clinic and are to keep appt.  If pain recurs, f/u sooner.      See [facility] for orders, medications, letters, patient instructions    Chata Giordano M.D.

## 2018-02-08 NOTE — TELEPHONE ENCOUNTER
Left message on voicemail for patient to call back.   Direct number given to call back: [phone].     Yi Ness RN

## 2018-02-08 NOTE — TELEPHONE ENCOUNTER
Called patient and gave providers message/ instructions.  Patient understands this.     Yi Ness RN

## 2018-02-12 ENCOUNTER — ALLIED HEALTH/NURSE VISIT (OUTPATIENT)
Dept: EDUCATION SERVICES | Facility: CLINIC | Age: 43
End: 2018-02-12
Payer: COMMERCIAL

## 2018-02-12 PROCEDURE — G0108 DIAB MANAGE TRN  PER INDIV: HCPCS

## 2018-02-12 PROCEDURE — 99207 ZZC DROP WITH A PROCEDURE: CPT

## 2018-02-12 RX ORDER — INSULIN GLARGINE 100 [IU]/ML
10 INJECTION, SOLUTION SUBCUTANEOUS DAILY
Qty: 15 ML | Refills: 0 | Status: SHIPPED | OUTPATIENT
Start: 2018-02-12 | End: 2018-04-24

## 2018-02-12 RX ORDER — METFORMIN HCL 500 MG
1000 TABLET, EXTENDED RELEASE 24 HR ORAL 2 TIMES DAILY WITH MEALS
Qty: 360 TABLET | Refills: 1 | Status: SHIPPED | OUTPATIENT
Start: 2018-02-12 | End: 2018-09-23

## 2018-02-12 NOTE — MR AVS SNAPSHOT
After Visit Summary   2/12/2018    Jesus Alberto Denson    MRN: [id number]           Patient Information     Date Of Birth          1975        Visit Information        Provider Department      2/12/2018 8:30 AM AN DIABETIC ED RESOURCE [facility] [place]        Today's Diagnoses     Type 2 diabetes, uncontrolled, with renal manifestation (H)    -  1      Care Instructions    My Diabetes Care Goals:    Healthy Eating: 3 meals per day, get to know carbs and proteins.  4-5 hrs between foods.     Being Active: walk to the bus and back and add another block.  Or exercise in the home.    Monitoring: check when you wake up and then 2 hrs after a meal.      Taking Medication: Metformin 1000 mg take with breakfast and with dinner.  Once you have finished the above bottle then start the new one.    (Metformin  mg take 2 pills with breakfast and 2 pills with dinner)  Basaglar 10 units tonight and every evening .      Follow up:  Follow-up diabetes education appointment scheduled on Monday March 12 @ 9:30.      Bring blood glucose meter and logbook with you to all doctor and follow-up appointments.     [facility] for the [facility]:  For Your Diabetes Education and Nutrition Appointments Call:  [phone]   For Diabetes Education or Nutrition Related Questions:   Phone: [phone]  E-mail: [email or web address]  Fax: [phone]   If you need a medication refill please contact your pharmacy. Please allow 3 business days for your refills to be completed.    Instructions for emailing the Diabetes Educators    If you need to communicate a non-urgent message to a Diabetes Educator via email, please send to [email or web address].    Please follow the following email guidelines:    Subject line: Secure: your clinic name (example: Secure: [place])  In the email please include: First name, middle initial, last name and date of birth.    We will  be in touch with you within one (1) business day.             Follow-ups after your visit        Your next 10 appointments already scheduled     Feb 14, 2018  8:40 AM CST   [facility] MD   [facility]    [address] [facility] [address] [phone]            Feb 20, 2018  9:45 AM CST   New Visit with Jimy Anguiano MD   [facility] ([address]  [place] [address] [phone]            Mar 12, 2018  9:30 AM CDT   Diabetic Education with AN DIABETIC ED RESOURCE   [facility] ([facility])    [address] [facility] [address] [phone]              Who to contact     If you have questions or need follow up information about today's clinic visit or your schedule please contact [facility] directly at [phone].  Normal or non-critical lab and imaging results will be communicated to you by [employer], letter or phone within 4 business days after the clinic has received the results. If you do not hear from us within 7 days, please contact the clinic through [employer] or phone. If you have a critical or abnormal lab result, we will notify you by phone as soon as possible.  Submit refill requests through [employer] or call your pharmacy and they will forward the refill request to us. Please allow 3 business days for your refill to be completed.          Additional Information About Your Visit        [employer] Information     [employer] gives you secure access to your electronic health record. If you see a primary care provider, you can also send messages to your care team and make appointments. If you have questions, please call your primary care clinic.  If you do not have a primary care provider, please call [phone] and they will assist you.        Care EveryWhere ID     This is your Care EveryWhere ID. This could be  used by other organizations to access your [place] medical records  [id number]         Blood Pressure from Last 3 Encounters:   02/07/18 125/75   01/23/18 136/85   03/27/15 119/80    Weight from Last 3 Encounters:   02/07/18 63.5 kg (140 lb)   01/23/18 63.9 kg (140 lb 12.8 oz)   03/27/15 73.5 kg (162 lb)              Today, you had the following     No orders found for display         Today's Medication Changes          These changes are accurate as of 2/12/18  9:31 AM.  If you have any questions, ask your nurse or doctor.               Start taking these medicines.        Dose/Directions    BASAGLAR 100 UNIT/ML injection   Used for:  Type 2 diabetes, uncontrolled, with renal manifestation (H)        Dose:  10 Units   Inject 10 Units Subcutaneous daily   Quantity:  15 mL   Refills:  0       metFORMIN 500 MG 24 hr tablet   Commonly known as:  GLUCOPHAGE-XR   Used for:  Type 2 diabetes, uncontrolled, with renal manifestation (H)   Replaces:  metFORMIN 1000 MG tablet        Dose:  1000 mg   Take 2 tablets (1,000 mg) by mouth 2 times daily (with meals)   Quantity:  360 tablet   Refills:  1         These medicines have changed or have updated prescriptions.        Dose/Directions    * insulin pen needle 31G X 5 MM   This may have changed:  Another medication with the same name was added. Make sure you understand how and when to take each.   Used for:  Type 2 diabetes, uncontrolled, with renal manifestation (H)        2 times per day   Quantity:  200 each   Refills:  3       * insulin pen needle 32G X 4 MM   Commonly known as:  BD ANAT U/F   This may have changed:  You were already taking a medication with the same name, and this prescription was added. Make sure you understand how and when to take each.   Used for:  Type 2 diabetes, uncontrolled, with renal manifestation (H)        Use 1 daily as directed.   Quantity:  100 each   Refills:  3       * Notice:  This list has 2 medication(s) that are the same as other  medications prescribed for you. Read the directions carefully, and ask your doctor or other care provider to review them with you.      Stop taking these medicines if you haven't already. Please contact your care team if you have questions.     metFORMIN 1000 MG tablet   Commonly known as:  GLUCOPHAGE   Replaced by:  metFORMIN 500 MG 24 hr tablet                Where to get your medicines      These medications were sent to [facility]/pharmacy #[id number] - [place], [place] - [address],  AT [address] [facility]  [address], , [facility] [address]     Phone:  [phone]     BASAGLAR 100 UNIT/ML injection    insulin pen needle 32G X 4 MM    metFORMIN 500 MG 24 hr tablet                Primary Care Provider Office Phone # Fax #    Edilia Xie [phone] [address] [address]        Equal Access to Services     [facility]: Hadii kayce vazquez hadasho Soomaali, waaxda luqadaha, qaybta kaalmada adeegyada, cuong hunter haymaryellen villar . So [facility] [phone].    ATENCIÓN: Si habla español, tiene a mcdowell disposición servicios gratuitos de asistencia lingüística. [facility] [phone].    We comply with applicable federal civil rights laws and [place] laws. We do not discriminate on the basis of race, color, national origin, age, disability, sex, sexual orientation, or gender identity.            Thank you!     Thank you for choosing [facility]  for your care. Our goal is always to provide you with excellent care. Hearing back from our patients is one way we can continue to improve our services. Please take a few minutes to complete the written survey that you may receive in the mail after your visit with us. Thank you!             Your Updated Medication List - Protect others around you: Learn how to safely use, store and throw away your medicines at www.disposemymeds.org.          This list is accurate as of 2/12/18  9:31 AM.   Always use your most recent med list.                   Brand Name Dispense Instructions for use Diagnosis    BASAGLAR 100 UNIT/ML injection     15 mL    Inject 10 Units Subcutaneous daily    Type 2 diabetes, uncontrolled, with renal manifestation (H)       blood glucose monitoring lancets     2 Box    Use to test blood sugar 2 times daily    Diabetes mellitus, type 2 (H)       blood glucose monitoring test strip    no brand specified    3 Box    Pt to use twice daily, 5 times per week  - accucheck aster    Type 2 diabetes mellitus without complication, unspecified long term insulin use status (H)       cholecalciferol 30751 UNITS capsule    VITAMIN D3    12 capsule    Take 1 capsule (50,000 Units) by mouth once a week    Unspecified vitamin D deficiency       ferrous sulfate 325 (65 FE) MG tablet    IRON    180 tablet    1 tablet twice daily    Type 2 diabetes, HbA1C goal < 8% (H)       HumaLOG MIX 75/25 KWIKPEN injection   Generic drug:  insulin lispro prot & lispro     60 mL    Pt to take 35 units in AM before breakfast and 18 units prior to supper    Type 2 diabetes, uncontrolled, with renal manifestation (H)       * insulin pen needle 31G X 5 MM     200 each    2 times per day    Type 2 diabetes, uncontrolled, with renal manifestation (H)       * insulin pen needle 32G X 4 MM    BD ASTER U/F    100 each    Use 1 daily as directed.    Type 2 diabetes, uncontrolled, with renal manifestation (H)       metFORMIN 500 MG 24 hr tablet    GLUCOPHAGE-XR    360 tablet    Take 2 tablets (1,000 mg) by mouth 2 times daily (with meals)    Type 2 diabetes, uncontrolled, with renal manifestation (H)       order for DME     1 each    Equipment being ordered: glucometer with lancets and strips to check sugars tid    Uncontrolled type 2 diabetes mellitus with chronic kidney disease, with long-term current use of insulin, unspecified CKD stage (H)       * Notice:  This list has 2 medication(s) that are the same as other medications  prescribed for you. Read the directions carefully, and ask your doctor or other care provider to review them with you.

## 2018-02-12 NOTE — PROGRESS NOTES
Diabetes Self Management Training: Individual Review Visit    Jesus Alberto Denson presents today for education, evaluation of glucose control and modification of medication(s) related to Type 2 diabetes.    She is accompanied by self    Patient's diabetes management related comments/concerns: here for diabetes.  Education. Had stopped her medications for 2 yrs.      Patient's emotional response to diabetes: expresses readiness to learn and concern for health and well-being    Patient would like this visit to be focused around the following diabetes-related behaviors and goals: Diabetes pathophysiology, Assistance with making lifestyle changes, Self-care behavioral goal setting, Healthy Eating, Being Active, Monitoring, Taking Medication, Problem Solving, Reducing Risks and Healthy Coping    ASSESSMENT:  Patient Problem List and Family Medical History reviewed for relevant medical history, current medical status, and diabetes risk factors.    Current Diabetes Management per Patient:  Taking diabetes medications?   yes:     Diabetes Medication(s)     Biguanides Sig    metFORMIN (GLUCOPHAGE) 1000 MG tablet Take 1 tablet (1,000 mg) by mouth 2 times daily (with meals)    Insulin Sig    HUMALOG MIX 75/25 KWIKPEN (75-25) 100 UNIT/ML susp Pt to take 35 units in AM before breakfast and 18 units prior to supper      , Oral Medications: Metformin - Dose: 1000 mg takes , Time: breakfast and supper    *Abbreviated insulin dose documentation key: Insulin Trade Name (vrztiujwv-cmuhp-jsdara-bedtime) - i.e. Humalog 5-5-5-0 (Humalog 5 units at breakfast, 5 units at lunch, and 5 units at dinner).    Past Diabetes Education: Yes    Patient glucose self monitoring as follows: three times daily.   BG meter: Accu-chek Yolanda meter  BG results: not available , 200's fasting, up to 400. Did not bring in her meter.      BG values are: Not in goal  Patient's most recent   Lab Results   Component Value Date    A1C 11.3 02/07/2018    is not meeting goal  "of <7.0    Nutrition:  Patient eats 3 meals per day    Breakfast - 1/2 cup of oatmeal  Lunch - sandwich and apple   Dinner : chicken stir goetz, or bowled chicken.  veggies   Snacks - mixed nuts    Beverages: water, tea,     Cultural/Jain diet restrictions: Yes     Biggest Challenge to Healthy Eating: knowing what to eat    Physical Activity:    Type: walking around the house.      Diabetes Risk Factors:  family history, ethnicity and inactivity    Diabetes Complications:  Not discussed today.    Vitals:  There were no vitals taken for this visit.  Estimated body mass index is 26.45 kg/(m^2) as calculated from the following:    Height as of 12/19/14: 1.549 m (5' 1\").    Weight as of 2/7/18: 63.5 kg (140 lb).   Last 3 BP:   BP Readings from Last 3 Encounters:   02/07/18 125/75   01/23/18 136/85   03/27/15 119/80       History   Smoking Status     Never Smoker   Smokeless Tobacco     Never Used     Comment: non-smoking household       Labs:  Lab Results   Component Value Date    A1C 11.3 02/07/2018     Lab Results   Component Value Date     02/07/2018     Lab Results   Component Value Date    LDL 84 02/07/2018     HDL Cholesterol   Date Value Ref Range Status   02/07/2018 35 (L) >49 mg/dL Final   ]  GFR Estimate   Date Value Ref Range Status   02/07/2018 >90 >60 mL/min/1.7m2 Final     Comment:     Non  GFR Calc     GFR Estimate If Black   Date Value Ref Range Status   02/07/2018 >90 >60 mL/min/1.7m2 Final     Comment:      GFR Calc     Lab Results   Component Value Date    CR 0.55 02/07/2018     No results found for: MICROALBUMIN    Socio/Economic Considerations:    Support system: not assessed    Health Beliefs and Attitudes:   Patient Activation Measure Survey Score:  No flowsheet data found.    Stage of Change: RELAPSE (Returned to unhealthy behavior)      Diabetes knowledge and skills assessment:     Patient is knowledgeable in diabetes management concepts related to: " Healthy Eating, Being Active, Monitoring and Taking Medication    Patient needs further education on the following diabetes management concepts: Healthy Eating, Being Active, Monitoring and Taking Medication    Barriers to Learning Assessment: English as a second language (ESL)    Based on learning assessment above, most appropriate setting for further diabetes education would be: Individual setting.    INTERVENTION:   Education provided today on:  AADE Self-Care Behaviors:  Healthy Eating: carbohydrate counting, consistency in amount, composition, and timing of food intake, weight reduction, heart healthy diet, eating out, portion control, plate planning method and label reading  Being Active: relationship to blood glucose and describe appropriate activity program  Monitoring: purpose, proper technique, log and interpret results, individual blood glucose targets and frequency of monitoring  Taking Medication: action of prescribed medication, drawing up, administering and storing injectable diabetes medications, proper site selection and rotation for injections, side effects of prescribed medications and when to take medications  Problem Solving: high blood glucose - causes, signs/symptoms, treatment and prevention, low blood glucose - causes, signs/symptoms, treatment and prevention and carrying a carbohydrate source at all times    Opportunities for ongoing education and support in diabetes-self management were discussed.    Pt verbalized understanding of concepts discussed and recommendations provided today.       Education Materials Provided:  [place] Understanding Diabetes Booklet, Carbohydrate Counting and Medication Information on Basaglar    PLAN:  See Patient Instructions for co-developed, patient-stated behavior change goals.  AVS printed and provided to patient today.    FOLLOW-UP:  Follow-up appointment scheduled on March 13.  Follow-up with PCP recommended.  Chart routed to referring  provider.    Ongoing plan for education and support: Follow-up visit with diabetes educator in [place] Perdomo RN/GRADY [place] Diabetes Educator    Time Spent: 60 minutes  Encounter Type: Individual    Any diabetes medication dose changes were made via the CDE Protocol and Collaborative Practice Agreement with the patient's primary care provider. A copy of this encounter was shared with the provider.

## 2018-02-12 NOTE — LETTER
2/12/2018         RE: Jesus Alberto Denson  [address]  [place] [address]        Dear Dr. Flores,  You will be seeing this patient tomorrow. She went 2 yrs without her medications.  States she is now wanting to control her BG better and get educated.  Because her A1C is so high, she needs to start long acting insulin along with Metformin until her BG are stable.  Started her on Basaglar.  Will dose according to her BG.    Please let me know if you agree and see my notes.    Thank you    Leah Perdomo RN/ZECHARIAHE  [place] Diabetes Educator          Diabetes Self Management Training: Individual Review Visit    Jesus Alberto Denson presents today for education, evaluation of glucose control and modification of medication(s) related to Type 2 diabetes.    She is accompanied by self    Patient's diabetes management related comments/concerns: here for diabetes.  Education. Had stopped her medications for 2 yrs.      Patient's emotional response to diabetes: expresses readiness to learn and concern for health and well-being    Patient would like this visit to be focused around the following diabetes-related behaviors and goals: Diabetes pathophysiology, Assistance with making lifestyle changes, Self-care behavioral goal setting, Healthy Eating, Being Active, Monitoring, Taking Medication, Problem Solving, Reducing Risks and Healthy Coping    ASSESSMENT:  Patient Problem List and Family Medical History reviewed for relevant medical history, current medical status, and diabetes risk factors.    Current Diabetes Management per Patient:  Taking diabetes medications?   yes:     Diabetes Medication(s)     Biguanides Sig    metFORMIN (GLUCOPHAGE) 1000 MG tablet Take 1 tablet (1,000 mg) by mouth 2 times daily (with meals)    Insulin Sig    HUMALOG MIX 75/25 KWIKPEN (75-25) 100 UNIT/ML susp Pt to take 35 units in AM before breakfast and 18 units prior to supper      , Oral Medications: Metformin - Dose: 1000 mg takes , Time: breakfast and  "supper    *Abbreviated insulin dose documentation key: Insulin Trade Name (fnkniuzhi-otwjg-pxbjym-bedtime) - i.e. Humalog 5-5-5-0 (Humalog 5 units at breakfast, 5 units at lunch, and 5 units at dinner).    Past Diabetes Education: Yes    Patient glucose self monitoring as follows: three times daily.   BG meter: Accu-chek Yolanda meter  BG results: not available , 200's fasting, up to 400. Did not bring in her meter.      BG values are: Not in goal  Patient's most recent   Lab Results   Component Value Date    A1C 11.3 02/07/2018    is not meeting goal of <7.0    Nutrition:  Patient eats 3 meals per day    Breakfast - 1/2 cup of oatmeal  Lunch - sandwich and apple   Dinner : chicken stir goetz, or bowled chicken.  veggies   Snacks - mixed nuts    Beverages: water, tea,     Cultural/Denominational diet restrictions: Yes     Biggest Challenge to Healthy Eating: knowing what to eat    Physical Activity:    Type: walking around the house.      Diabetes Risk Factors:  family history, ethnicity and inactivity    Diabetes Complications:  Not discussed today.    Vitals:  There were no vitals taken for this visit.  Estimated body mass index is 26.45 kg/(m^2) as calculated from the following:    Height as of 12/19/14: 1.549 m (5' 1\").    Weight as of 2/7/18: 63.5 kg (140 lb).   Last 3 BP:   BP Readings from Last 3 Encounters:   02/07/18 125/75   01/23/18 136/85   03/27/15 119/80       History   Smoking Status     Never Smoker   Smokeless Tobacco     Never Used     Comment: non-smoking household       Labs:  Lab Results   Component Value Date    A1C 11.3 02/07/2018     Lab Results   Component Value Date     02/07/2018     Lab Results   Component Value Date    LDL 84 02/07/2018     HDL Cholesterol   Date Value Ref Range Status   02/07/2018 35 (L) >49 mg/dL Final   ]  GFR Estimate   Date Value Ref Range Status   02/07/2018 >90 >60 mL/min/1.7m2 Final     Comment:     Non  GFR Calc     GFR Estimate If Black   Date " Value Ref Range Status   02/07/2018 >90 >60 mL/min/1.7m2 Final     Comment:      GFR Calc     Lab Results   Component Value Date    CR 0.55 02/07/2018     No results found for: MICROALBUMIN    Socio/Economic Considerations:    Support system: not assessed    Health Beliefs and Attitudes:   Patient Activation Measure Survey Score:  No flowsheet data found.    Stage of Change: RELAPSE (Returned to unhealthy behavior)      Diabetes knowledge and skills assessment:     Patient is knowledgeable in diabetes management concepts related to: Healthy Eating, Being Active, Monitoring and Taking Medication    Patient needs further education on the following diabetes management concepts: Healthy Eating, Being Active, Monitoring and Taking Medication    Barriers to Learning Assessment: English as a second language (ESL)    Based on learning assessment above, most appropriate setting for further diabetes education would be: Individual setting.    INTERVENTION:   Education provided today on:  AADE Self-Care Behaviors:  Healthy Eating: carbohydrate counting, consistency in amount, composition, and timing of food intake, weight reduction, heart healthy diet, eating out, portion control, plate planning method and label reading  Being Active: relationship to blood glucose and describe appropriate activity program  Monitoring: purpose, proper technique, log and interpret results, individual blood glucose targets and frequency of monitoring  Taking Medication: action of prescribed medication, drawing up, administering and storing injectable diabetes medications, proper site selection and rotation for injections, side effects of prescribed medications and when to take medications  Problem Solving: high blood glucose - causes, signs/symptoms, treatment and prevention, low blood glucose - causes, signs/symptoms, treatment and prevention and carrying a carbohydrate source at all times    Opportunities for ongoing education  and support in diabetes-self management were discussed.    Pt verbalized understanding of concepts discussed and recommendations provided today.       Education Materials Provided:  [place] Understanding Diabetes Booklet, Carbohydrate Counting and Medication Information on Basaglar    PLAN:  See Patient Instructions for co-developed, patient-stated behavior change goals.  AVS printed and provided to patient today.    FOLLOW-UP:  Follow-up appointment scheduled on March 13.  Follow-up with PCP recommended.  Chart routed to referring provider.    Ongoing plan for education and support: Follow-up visit with diabetes educator in [place]    Leah Perdomo RN/GRADY [facility]ador Diabetes Educator    Time Spent: 60 minutes  Encounter Type: Individual    Any diabetes medication dose changes were made via the CDE Protocol and Collaborative Practice Agreement with the patient's primary care provider. A copy of this encounter was shared with the provider.

## 2018-02-12 NOTE — PATIENT INSTRUCTIONS
My Diabetes Care Goals:    Healthy Eating: 3 meals per day, get to know carbs and proteins.  4-5 hrs between foods.     Being Active: walk to the bus and back and add another block.  Or exercise in the home.    Monitoring: check when you wake up and then 2 hrs after a meal.      Taking Medication: Metformin 1000 mg take with breakfast and with dinner.  Once you have finished the above bottle then start the new one.    (Metformin  mg take 2 pills with breakfast and 2 pills with dinner)  Basaglar 10 units tonight and every evening .      Follow up:  Follow-up diabetes education appointment scheduled on Monday March 12 @ 9:30.      Bring blood glucose meter and logbook with you to all doctor and follow-up appointments.     [facility] for the [facility] Area:  For Your Diabetes Education and Nutrition Appointments Call:  [phone]   For Diabetes Education or Nutrition Related Questions:   Phone: [phone]  E-mail: [email or web address]  Fax: [phone]   If you need a medication refill please contact your pharmacy. Please allow 3 business days for your refills to be completed.    Instructions for emailing the Diabetes Educators    If you need to communicate a non-urgent message to a Diabetes Educator via email, please send to [email or web address].    Please follow the following email guidelines:    Subject line: Secure: your clinic name (example: Secure: [facility])  In the email please include: First name, middle initial, last name and date of birth.    We will be in touch with you within one (1) business day.

## 2018-02-14 ENCOUNTER — OFFICE VISIT (OUTPATIENT)
Dept: FAMILY MEDICINE | Facility: CLINIC | Age: 43
End: 2018-02-14
Payer: COMMERCIAL

## 2018-02-14 VITALS
DIASTOLIC BLOOD PRESSURE: 72 MMHG | SYSTOLIC BLOOD PRESSURE: 115 MMHG | WEIGHT: 144 LBS | HEART RATE: 84 BPM | TEMPERATURE: 97.7 F | HEIGHT: 60 IN | BODY MASS INDEX: 28.27 KG/M2

## 2018-02-14 DIAGNOSIS — D64.9 ANEMIA, UNSPECIFIED TYPE: ICD-10-CM

## 2018-02-14 DIAGNOSIS — N18.1 CKD (CHRONIC KIDNEY DISEASE) STAGE 1, GFR 90 ML/MIN OR GREATER: ICD-10-CM

## 2018-02-14 DIAGNOSIS — E78.5 HYPERLIPIDEMIA WITH TARGET LDL LESS THAN 100: ICD-10-CM

## 2018-02-14 LAB
ERYTHROCYTE [DISTWIDTH] IN BLOOD BY AUTOMATED COUNT: 22.9 % (ref 10–15)
HCT VFR BLD AUTO: 35.6 % (ref 35–47)
HGB BLD-MCNC: 9.9 G/DL (ref 11.7–15.7)
MCH RBC QN AUTO: 19.8 PG (ref 26.5–33)
MCHC RBC AUTO-ENTMCNC: 27.8 G/DL (ref 31.5–36.5)
MCV RBC AUTO: 71 FL (ref 78–100)
PLATELET # BLD AUTO: 318 10E9/L (ref 150–450)
RBC # BLD AUTO: 5 10E12/L (ref 3.8–5.2)
TSH SERPL DL<=0.005 MIU/L-ACNC: 3.41 MU/L (ref 0.4–4)
WBC # BLD AUTO: 5.7 10E9/L (ref 4–11)

## 2018-02-14 PROCEDURE — 99207 C FOOT EXAM  NO CHARGE: CPT | Performed by: FAMILY MEDICINE

## 2018-02-14 PROCEDURE — 99214 OFFICE O/P EST MOD 30 MIN: CPT | Performed by: FAMILY MEDICINE

## 2018-02-14 PROCEDURE — 85027 COMPLETE CBC AUTOMATED: CPT | Performed by: FAMILY MEDICINE

## 2018-02-14 PROCEDURE — 84443 ASSAY THYROID STIM HORMONE: CPT | Performed by: FAMILY MEDICINE

## 2018-02-14 PROCEDURE — 36415 COLL VENOUS BLD VENIPUNCTURE: CPT | Performed by: FAMILY MEDICINE

## 2018-02-14 NOTE — PROGRESS NOTES
SUBJECTIVE:   Jesus Alberto Denson is a 42 year old female who presents to clinic today for the following health issues:        Diabetes Follow-up    Patient is checking blood sugars: twice daily.    Blood sugar testing frequency justification: On insulin, frequency appropriate   Results are as follows:         Around 256 today     Diabetic concerns: blood sugar frequently over 200     Symptoms of hypoglycemia (low blood sugar): none     Paresthesias (numbness or burning in feet) or sores: Yes sometimes     Date of last diabetic eye exam: never    Hyperlipidemia Follow-Up      Rate your low fat/cholesterol diet?: fair    Taking statin?  No    Other lipid medications/supplements?:  none    Hypertension Follow-up      Outpatient blood pressures are not being checked.    Low Salt Diet: not monitoring salt    BP Readings from Last 2 Encounters:   02/14/18 115/72   02/07/18 125/75     Hemoglobin A1C (%)   Date Value   02/07/2018 11.3 (H)   10/06/2013 7.6 (H)     LDL Cholesterol Calculated (mg/dL)   Date Value   02/07/2018 84   03/27/2015     Cannot estimate LDL when triglyceride exceeds 400 mg/dL       Amount of exercise or physical activity: 2 times per week     Problems taking medications regularly: No    Medication side effects: none    Diet: regular (no restrictions)        Please see note from diab ed. Pt to FU with diab ed in one month  On insulin and metformin  Pt not using any form of contraception so no statin or acei at this time.   She states BSs are coming down.     Pt also diagnosed with anemia last month  Previous hgb was normal  Is now taking otc MVI with iron  Recheck today shows improved hgb and MCV so will have her continue to take it with juice.  Recheck in one month  She has regular periods and heavy for a day or so but not excessive.  Does eat chicken and port.   No family history of colon cancer and no black or bloody stools.     Problem list and histories reviewed & adjusted, as indicated.  Additional  history: as documented    Labs reviewed in EPIC    Reviewed and updated as needed this visit by clinical staff  Tobacco  Allergies  Meds  Med Hx  Surg Hx  Fam Hx  Soc Hx      Reviewed and updated as needed this visit by Provider         ROS:  Constitutional, HEENT, cardiovascular, pulmonary, gi and gu systems are negative, except as otherwise noted.    OBJECTIVE:     /72  Pulse 84  Temp 97.7  F (36.5  C) (Oral)  Ht 5' (1.524 m)  Wt 144 lb (65.3 kg)  BMI 28.12 kg/m2  Body mass index is 28.12 kg/(m^2).  GENERAL: healthy, alert and no distress  NECK: no adenopathy, no asymmetry, masses, or scars and thyroid normal to palpation  RESP: lungs clear to auscultation - no rales, rhonchi or wheezes  CV: regular rate and rhythm, normal S1 S2, no S3 or S4, no murmur, click or rub, no peripheral edema and peripheral pulses strong  ABDOMEN: soft, nontender, no hepatosplenomegaly, no masses and bowel sounds normal  MS: no gross musculoskeletal defects noted, no edema  Diabetic foot exam: normal DP and PT pulses, no trophic changes or ulcerative lesions and normal sensory exam    Diagnostic Test Results:  none     ASSESSMENT/PLAN:     1. Uncontrolled type 2 diabetes mellitus with stage 1 chronic kidney disease, with long-term current use of insulin (H)  As above, fu with diab ed in one month  - TSH with free T4 reflex  - FOOT EXAM  - CBC with platelets  - ACE/ARB/ARNI NOT PRESCRIBED, INTENTIONAL,; Please choose reason not prescribed, below  - STATIN NOT PRESCRIBED, INTENTIONAL,; Please choose reason not prescribed, below    2. CKD (chronic kidney disease) stage 1, GFR 90 ml/min or greater  Unable to take ace at this time    3. Hyperlipidemia with target LDL less than 100  Unable to take statin at this time    4. Anemia, unspecified type  Improved as above  - CBC with platelets        Mary Singh MD  [facility]

## 2018-02-14 NOTE — MR AVS SNAPSHOT
After Visit Summary   2/14/2018    Jesus Alberto Denson    MRN: [id number]           Patient Information     Date Of Birth          1975        Visit Information        Provider Department      2/14/2018 8:40 AM Mary Flores MD [facility]        Today's Diagnoses     Uncontrolled type 2 diabetes mellitus with stage 1 chronic kidney disease, with long-term current use of insulin (H)    -  1    CKD (chronic kidney disease) stage 1, GFR 90 ml/min or greater        Hyperlipidemia with target LDL less than 100        Anemia, unspecified type           Follow-ups after your visit        Your next 10 appointments already scheduled     Feb 20, 2018  9:45 AM CST   New Visit with Jimy Anguiano MD   [facility] ([address] [address]   [phone]            Mar 12, 2018  9:30 AM CDT   Diabetic Education with AN DIABETIC ED RESOURCE   [facility] ([facility])    [address] [address] [phone]            May 16, 2018  9:20 AM CDT   SHORT with Mary Flores MD   [facility] ([facility])    [address] [facility] [address] [phone]              Who to contact     If you have questions or need follow up information about today's clinic visit or your schedule please contact [facility] directly at [phone].  Normal or non-critical lab and imaging results will be communicated to you by MyChart, letter or phone within 4 business days after the clinic has received the results. If you do not hear from us within 7 days, please contact the clinic through MyChart or phone. If you have a critical or abnormal lab result, we will notify you by phone as soon as possible.  Submit refill requests through [email or web address] or call your pharmacy and they will forward the refill request to us. Please allow 3 business days for your  refill to be completed.          Additional Information About Your Visit        [employer] Information     [employer] gives you secure access to your electronic health record. If you see a primary care provider, you can also send messages to your care team and make appointments. If you have questions, please call your primary care clinic.  If you do not have a primary care provider, please call [phone] and they will assist you.        Care EveryWhere ID     This is your Care EveryWhere ID. This could be used by other organizations to access your [facility] medical records  [id number]        Your Vitals Were     Pulse Temperature Height BMI (Body Mass Index)          84 97.7  F (36.5  C) (Oral) 5' (1.524 m) 28.12 kg/m2         Blood Pressure from Last 3 Encounters:   02/14/18 115/72   02/07/18 125/75   01/23/18 136/85    Weight from Last 3 Encounters:   02/14/18 144 lb (65.3 kg)   02/07/18 140 lb (63.5 kg)   01/23/18 140 lb 12.8 oz (63.9 kg)              We Performed the Following     CBC with platelets     FOOT EXAM     TSH with free T4 reflex          Today's Medication Changes          These changes are accurate as of 2/14/18 11:29 AM.  If you have any questions, ask your nurse or doctor.               Start taking these medicines.        Dose/Directions    ACE/ARB/ARNI NOT PRESCRIBED (INTENTIONAL)   Used for:  Uncontrolled type 2 diabetes mellitus with stage 1 chronic kidney disease, with long-term current use of insulin (H)   Started by:  Mary Flores MD        Please choose reason not prescribed, below   Refills:  0       STATIN NOT PRESCRIBED (INTENTIONAL)   Used for:  Uncontrolled type 2 diabetes mellitus with stage 1 chronic kidney disease, with long-term current use of insulin (H)   Started by:  Mary Flores MD        Please choose reason not prescribed, below   Refills:  0            Where to get your medicines      Some of these will need a paper prescription and others can be bought over the  counter.  Ask your nurse if you have questions.     You don't need a prescription for these medications     ACE/ARB/ARNI NOT PRESCRIBED (INTENTIONAL)    STATIN NOT PRESCRIBED (INTENTIONAL)                Primary Care Provider Office Phone # Fax #    Edilia Xie [phone] [address]  [place] [address]        Equal Access to Services     [facility]: Hadii aad ku hadasho Soomaali, waaxda luqadaha, qaybta kaalmada adeegyada, waxay idiin hayaan adeeg kharash laautumnn . So [facility] [phone].    ATENCIÓN: Si habla español, tiene a mcdowell disposición servicios gratuitos de asistencia lingüística. Llame al [phone].    We comply with applicable federal civil rights laws and [place] laws. We do not discriminate on the basis of race, color, national origin, age, disability, sex, sexual orientation, or gender identity.            Thank you!     Thank you for choosing [facility]  for your care. Our goal is always to provide you with excellent care. Hearing back from our patients is one way we can continue to improve our services. Please take a few minutes to complete the written survey that you may receive in the mail after your visit with us. Thank you!             Your Updated Medication List - Protect others around you: Learn how to safely use, store and throw away your medicines at www.disposemymeds.org.          This list is accurate as of 2/14/18 11:29 AM.  Always use your most recent med list.                   Brand Name Dispense Instructions for use Diagnosis    ACE/ARB/ARNI NOT PRESCRIBED (INTENTIONAL)      Please choose reason not prescribed, below    Uncontrolled type 2 diabetes mellitus with stage 1 chronic kidney disease, with long-term current use of insulin (H)       BASAGLAR 100 UNIT/ML injection     15 mL    Inject 10 Units Subcutaneous daily    Type 2 diabetes, uncontrolled, with renal manifestation (H)       blood glucose monitoring lancets     2 Box     Use to test blood sugar 2 times daily    Diabetes mellitus, type 2 (H)       blood glucose monitoring test strip    no brand specified    3 Box    Pt to use twice daily, 5 times per week  - accucheck aster    Type 2 diabetes mellitus without complication, unspecified long term insulin use status (H)       cholecalciferol 27628 UNITS capsule    VITAMIN D3    12 capsule    Take 1 capsule (50,000 Units) by mouth once a week    Unspecified vitamin D deficiency       ferrous sulfate 325 (65 FE) MG tablet    IRON    180 tablet    1 tablet twice daily    Type 2 diabetes, HbA1C goal < 8% (H)       HumaLOG MIX 75/25 KWIKPEN injection   Generic drug:  insulin lispro prot & lispro     60 mL    Pt to take 35 units in AM before breakfast and 18 units prior to supper    Type 2 diabetes, uncontrolled, with renal manifestation (H)       * insulin pen needle 31G X 5 MM     200 each    2 times per day    Type 2 diabetes, uncontrolled, with renal manifestation (H)       * insulin pen needle 32G X 4 MM    BD ASTER U/F    100 each    Use 1 daily as directed.    Type 2 diabetes, uncontrolled, with renal manifestation (H)       metFORMIN 500 MG 24 hr tablet    GLUCOPHAGE-XR    360 tablet    Take 2 tablets (1,000 mg) by mouth 2 times daily (with meals)    Type 2 diabetes, uncontrolled, with renal manifestation (H)       order for DME     1 each    Equipment being ordered: glucometer with lancets and strips to check sugars tid    Uncontrolled type 2 diabetes mellitus with chronic kidney disease, with long-term current use of insulin, unspecified CKD stage (H)       STATIN NOT PRESCRIBED (INTENTIONAL)      Please choose reason not prescribed, below    Uncontrolled type 2 diabetes mellitus with stage 1 chronic kidney disease, with long-term current use of insulin (H)       * Notice:  This list has 2 medication(s) that are the same as other medications prescribed for you. Read the directions carefully, and ask your doctor or other care  provider to review them with you.

## 2018-02-20 ENCOUNTER — OFFICE VISIT (OUTPATIENT)
Dept: UROLOGY | Facility: CLINIC | Age: 43
End: 2018-02-20
Payer: COMMERCIAL

## 2018-02-20 ENCOUNTER — RADIANT APPOINTMENT (OUTPATIENT)
Dept: GENERAL RADIOLOGY | Facility: CLINIC | Age: 43
End: 2018-02-20
Attending: UROLOGY
Payer: COMMERCIAL

## 2018-02-20 VITALS — SYSTOLIC BLOOD PRESSURE: 156 MMHG | OXYGEN SATURATION: 100 % | DIASTOLIC BLOOD PRESSURE: 92 MMHG | HEART RATE: 90 BPM

## 2018-02-20 DIAGNOSIS — N20.1 CALCULUS OF URETER: Primary | ICD-10-CM

## 2018-02-20 DIAGNOSIS — N26.1 RENAL ATROPHY, LEFT: ICD-10-CM

## 2018-02-20 DIAGNOSIS — Z87.440 PERSONAL HISTORY OF URINARY TRACT INFECTION: ICD-10-CM

## 2018-02-20 DIAGNOSIS — R03.0 ELEVATED BLOOD PRESSURE READING WITHOUT DIAGNOSIS OF HYPERTENSION: ICD-10-CM

## 2018-02-20 PROCEDURE — 74019 RADEX ABDOMEN 2 VIEWS: CPT

## 2018-02-20 PROCEDURE — 99204 OFFICE O/P NEW MOD 45 MIN: CPT | Performed by: UROLOGY

## 2018-02-20 ASSESSMENT — PAIN SCALES - GENERAL: PAINLEVEL: NO PAIN (0)

## 2018-02-20 NOTE — PROGRESS NOTES
S: See dictated note   Current Outpatient Prescriptions   Medication Sig Dispense Refill     ACE/ARB/ARNI NOT PRESCRIBED, INTENTIONAL, Please choose reason not prescribed, below       BASAGLAR 100 UNIT/ML injection Inject 10 Units Subcutaneous daily 15 mL 0     insulin pen needle (BD ANAT U/F) 32G X 4 MM Use 1 daily as directed. 100 each 3     metFORMIN (GLUCOPHAGE-XR) 500 MG 24 hr tablet Take 2 tablets (1,000 mg) by mouth 2 times daily (with meals) 360 tablet 1     order for DME Equipment being ordered: glucometer with lancets and strips to check sugars tid 1 each 1     blood glucose monitoring (NO BRAND SPECIFIED) test strip Pt to use twice daily, 5 times per week  - accucheck anat 3 Box 11     HUMALOG MIX 75/25 KWIKPEN (75-25) 100 UNIT/ML susp Pt to take 35 units in AM before breakfast and 18 units prior to supper 60 mL 3     blood glucose monitoring (ACCU-CHEK FASTCLIX) lancets Use to test blood sugar 2 times daily 2 Box 3     insulin pen needle 31G X 5 MM 2 times per day 200 each 3     ferrous sulfate (IRON) 325 (65 FE) MG tablet 1 tablet twice daily 180 tablet 1     STATIN NOT PRESCRIBED, INTENTIONAL, Please choose reason not prescribed, below (Patient not taking: Reported on 2/20/2018)       cholecalciferol (VITAMIN D3) 80046 UNITS capsule Take 1 capsule (50,000 Units) by mouth once a week (Patient not taking: Reported on 2/20/2018) 12 capsule 0     Allergies   Allergen Reactions     Nkda [No Known Drug Allergies]      Past Medical History:   Diagnosis Date     CKD (chronic kidney disease) stage 1, GFR 90 ml/min or greater      DM mellitus, gestational      Hyperlipidemia      Obesity 8/13/2012     Type 2 diabetes, HbA1c goal < 7% (H) 3/11/2013     Past Surgical History:   Procedure Laterality Date     HC TOOTH EXTRACTION W/FORCEP        Family History   Problem Relation Age of Onset     DIABETES Mother      CANCER No family hx of      C.A.D. No family hx of      Hypertension No family hx of       CEREBROVASCULAR DISEASE No family hx of      Thyroid Disease No family hx of      Glaucoma No family hx of      Macular Degeneration No family hx of      Social History     Social History     Marital status:      Spouse name:      Number of children: 8     Years of education: 12     Occupational History      Homemaker     Social History Main Topics     Smoking status: Never Smoker     Smokeless tobacco: Never Used      Comment: non-smoking household     Alcohol use No     Drug use: No     Sexual activity: Yes     Partners: Male     Other Topics Concern     Parent/Sibling W/ Cabg, Mi Or Angioplasty Before 65f 55m? No     Social History Narrative       REVIEW OF SYSTEMS  =================  C: NEGATIVE for fever, chills, change in weight  I: NEGATIVE for worrisome rashes, moles or lesions  E/M: NEGATIVE for ear, mouth and throat problems  R: NEGATIVE for significant cough or SHORTNESS OF BREATH  CV:  NEGATIVE for chest pain, palpitations or peripheral edema  GI: NEGATIVE for nausea, abdominal pain, heartburn, or change in bowel habits  NEURO: NEGATIVE numbness/weakness  : see HPI  PSYCH: NEGATIVE depression/anxiety  LYmph: no new enlarged lymph nodes  Ortho: no new trauma/movements      Physical Exam:  BP (!) 156/92 (BP Location: Right arm, Patient Position: Sitting, Cuff Size: Adult Regular)  Pulse 90  SpO2 100%   Patient is pleasant, in no acute distress, good general condition.  HEENT:  Normalcephalic, atraumatic  Lung: no evidence of respiratory distress    Abdomen: Soft, nondistended, non tender. No masses. No rebound or guarding.   Exam: no cva tenderness  Skin: Warm and dry.  No redness.  Neuro: grossly normal  Psych normal mood and affect  Musculoskeletal  moving all extremities    I discussed different treatment options including watchful waiting, ESWL and ureteroscopy. Given the location of the stone I think ureteroscopy with possible holmium laser treatment is the best treatment option.    I  discussed the benefits and risks including the risks of infection, bleeding, failure to access the ureter, damage to the ureter with subsequent stricture formation, need for additional procedures and the possible need for a postoperative ureteral stent. I told the patient that in some circumstances we are unable to advance the ureteroscope to access the kidney. In those instances I usually recommend simply placing a ureteral stent with plans to return to the operating room in several weeks for a repeat attempt at ureteroscopy.    The patient was advised that if a post-op stent is placed a subsequent cystoscopy may be required for stent removal. The patient was given an opportunity to ask questions about the planned procedure and wishes to proceed.    Risks of anesthesia bleeding, infection, irritative voiding symptoms, ureteral perforation and the need for prolonged stenting and a percutaneous nephrostomy tube or nephrectomy were thoroughly discussed.    Expected stone free rates, risk of second procedure, injury to the ureter, bladder or kidney and risk of urosepsis were discussed.    Assessment/Plan:   See dictated note

## 2018-02-20 NOTE — PROGRESS NOTES
Visit Date:   2018      `SUBJECTIVE:  The patient is a 42-year-old ong female who was requested to be seen by Dr. Chata Giordano for a consultation with regard to patient's ureteral stone.        The patient was seen at the emergency room a couple months ago with pyelonephritis and was found to have a 1.3 cm left distal ureteral stone with hydronephrosis and also there is some evidence of atrophy of the left kidney.  She left the hospital against medical advice but did receive antibiotics.  Since then,  she has not had any pain.  She has no fever.  She denies any history of kidney stones or kidney diseases in the past.      KUB today showed a very large calcification in the left distal ureter measuring over 1 cm in length.      ASSESSMENT:  A 42-year-old female with a history of reviewed in the renal colic and infection which has been treated.  There is some evidence of renal atrophy, based on recent CT scan.  She has a huge stone in the left distal ureter which is causing damage to the kidney slowly.      PLAN:  I had a long discussion with the patient and her  today with regard to her situation.  I discussed ureteroscopy with stone extraction.  Given the fact that the stone is quite large and I suspect it has been there for a long time, there is a high risk of having a ureteral stricture with stone impaction and even more after the surgery is done.  If that happens, she might need additional treatment for that.  We will schedule the patient for a ureteroscopy and stone extraction.         INGRID BARBOSA MD             D: 2018   T: 2018   MT: GUILLE      Name:     JÚNIOR SAL   MRN:      [id number]        Account:      [id number]   :      1975           Visit Date:   2018      Document: [id number]

## 2018-02-20 NOTE — MR AVS SNAPSHOT
After Visit Summary   2/20/2018    Jesus Alberto Denson    MRN: [id number]           Patient Information     Date Of Birth          1975        Visit Information        Provider Department      2/20/2018 9:45 AM [facility]        Today's Diagnoses     Calculus of ureter    -  1    Renal atrophy, left        Personal history of urinary tract infection        Elevated blood pressure reading without diagnosis of hypertension          Care Instructions    Please call our office if you have questions or need to report any information, [phone].    Treating Kidney Stones: Ureteroscopic Stone Removal    Ureteroscopic stone removal may be done before, after, or instead of other treatments. If you need this procedure, your healthcare provider will discuss its risks and possible complications. You will be told how to prepare. And you will be told about anesthesia that will keep you pain-free during treatment.     A ureteroscope lets your doctor see your stone before removing it.   Removing the stone through the ureter  Ureteroscopic stone removal extracts a small stone in your ureter without an incision. Your doctor places a viewing tube (ureteroscope) in your ureter. A wire basket inserted through the tube removes the stone. Sometimes, a laser or a mechanical device is used to break up the stone. A soft tube may be left in your ureter briefly to drain urine.     The stone may be fragmented. The stone is then withdrawn or passed.   Your recovery  This is an outpatient or overnight procedure. For a few days after surgery, you may feel some pain when you urinate. Or you may need to urinate more often, or have bloody urine. You may have a ureteral stent. This is a soft tube that prevents blockage from swelling after the procedure. The stent is removed when the swelling goes down, often within days. Follow up as instructed to check for any new stones.  When to call your  healthcare provider  Call your healthcare provider right away if:    You have sudden pain or flank pain    You have a fever over 100.4 F (38 C)    You have nausea that lasts for days    You have heavy bleeding when you urinate    You have heavy bleeding through your drainage tube    You have swelling or redness around your incision   Date Last Reviewed: 2/1/2017 2000-2017 [employer]. [address] [place] [address]. All rights reserved. This information is not intended as a substitute for professional medical care. Always follow your healthcare professional's instructions.        Ureteral Stents  A ureteral stent is a soft plastic tube with holes in it. It s temporarily inserted into a ureter to help drain urine into the bladder. One end goes in the kidney. The other end goes in the bladder. A coil on each end holds the stent in place. The stent can t be seen from outside the body. It shouldn t interfere with your normal routine. Your stent will be put in by a doctor trained in treating the urinary tract (a urologist) or another specialist. The procedure is done in a hospital or surgery center. You ll likely go home the same day.  When is a ureteral stent used?  A ureteral stent may be used:    To bypass a blockage in a kidney or ureter.    During kidney stone removal.    To let a ureter heal after surgery.    Before the Procedure  Your healthcare provider will give you instructions to prepare for the procedure. X-rays or other imaging tests of your kidneys and ureters may be done beforehand.  During the procedure    You receive medicine to prevent pain and help you relax or sleep during the procedure. Once this takes effect, the procedure starts.    The doctor inserts a cystoscope (lighted instrument) through the urethra and into the bladder. This shows the opening to the ureter.    A thin wire is carefully threaded through the cystoscope, up the ureter, and into the kidney. The stent is  inserted over the wire.    A fluoroscope (special X-ray machine) is used to help position the stent. When the stent is in place, the wire and cystoscope are removed.  While you have a stent    Some discomfort is normal. Certain movements may trigger pain or a feeling that you need to urinate. You may also feel mild soreness or pressure before or during urination. These symptoms will go away a few days after the stent is removed.    Medicine to control pain or bladder spasms or to prevent infection may be prescribed. Take this as directed.    Drink plenty of fluids to help flush out your urinary tract.    Your urine may be slightly pink or red. This is due to bleeding caused by minor irritation from the stent. This may happen on and off while you have the stent.    As with any synthetic device placed in the body, there is a risk of infection. The stent may have to be removed if this happens.   How long will you need a stent?  The stent is often taken out after the blockage in the ureter is treated or the ureter has healed. This may take 1 week to 2 weeks, or longer. If a stent is needed for a long time, it may need to be changed every few months.  When to call your healthcare provider  Contact your healthcare provider right away if:    Your urine contains blood clots or you see a large amount of blood-tinged urine    You have symptoms similar to those you had before the stent was placed    You constantly leak urine    You have a fever over 100.4 F (38 C), chills, nausea, or vomiting    Your pain is not relieved with medicine    The end of the stent comes out of the urethra   Date Last Reviewed: 1/1/2017 2000-2017 [employer]. [address] [place] [address], [place] [address]. All rights reserved. This information is not intended as a substitute for professional medical care. Always follow your healthcare professional's instructions.                Follow-ups after your visit        Your next 10 appointments  already scheduled     Mar 12, 2018  9:30 AM CDT   Diabetic Education with AN DIABETIC ED RESOURCE   [facility] ([facility])    [id number] [facility] [address] [phone]            May 16, 2018  9:20 AM CDT   SHORT with Mary Flores MD   [facility] ([facility]    [address] [facility] [address] [phone]              Who to contact     If you have questions or need follow up information about today's clinic visit or your schedule please contact [facility] directly at [phone].  Normal or non-critical lab and imaging results will be communicated to you by [employer], letter or phone within 4 business days after the clinic has received the results. If you do not hear from us within 7 days, please contact the clinic through [employer] or phone. If you have a critical or abnormal lab result, we will notify you by phone as soon as possible.  Submit refill requests through [employer] or call your pharmacy and they will forward the refill request to us. Please allow 3 business days for your refill to be completed.          Additional Information About Your Visit        [employer] Information     [employer] gives you secure access to your electronic health record. If you see a primary care provider, you can also send messages to your care team and make appointments. If you have questions, please call your primary care clinic.  If you do not have a primary care provider, please call [phone] and they will assist you.        Care EveryWhere ID     This is your Care EveryWhere ID. This could be used by other organizations to access your [facility] medical records  [id number]        Your Vitals Were     Pulse Pulse Oximetry                90 100%           Blood Pressure from Last 3 Encounters:   02/20/18 (!) 156/92   02/14/18 115/72   02/07/18 125/75    Weight from Last 3 Encounters:   02/14/18 65.3 kg (144 lb)    02/07/18 63.5 kg (140 lb)   01/23/18 63.9 kg (140 lb 12.8 oz)              We Performed the Following     XR  KUB [[id number]]        Primary Care Provider Office Phone # Fax #    Edilia Xie [phone] [address]  [place] [address]        Equal Access to Services     [facility]: Hadii aad ku hadasho Soomaali, waaxda luqadaha, qaybta kaalmada adeegyada, waxay idiin hayaan adeeg kharash la'aan ah. So [facility] [phone].    ATENCIÓN: Si habla español, tiene a mcdowell disposición servicios gratuitos de asistencia lingüística. Llame al [phone].    We comply with applicable federal civil rights laws and [place] laws. We do not discriminate on the basis of race, color, national origin, age, disability, sex, sexual orientation, or gender identity.            Thank you!     Thank you for choosing [facility]  for your care. Our goal is always to provide you with excellent care. Hearing back from our patients is one way we can continue to improve our services. Please take a few minutes to complete the written survey that you may receive in the mail after your visit with us. Thank you!             Your Updated Medication List - Protect others around you: Learn how to safely use, store and throw away your medicines at www.disposemymeds.org.          This list is accurate as of 2/20/18 10:36 AM.  Always use your most recent med list.                   Brand Name Dispense Instructions for use Diagnosis    ACE/ARB/ARNI NOT PRESCRIBED (INTENTIONAL)      Please choose reason not prescribed, below    Uncontrolled type 2 diabetes mellitus with stage 1 chronic kidney disease, with long-term current use of insulin (H)       BASAGLAR 100 UNIT/ML injection     15 mL    Inject 10 Units Subcutaneous daily    Type 2 diabetes, uncontrolled, with renal manifestation (H)       blood glucose monitoring lancets     2 Box    Use to test blood sugar 2 times daily    Diabetes mellitus, type 2  (H)       blood glucose monitoring test strip    no brand specified    3 Box    Pt to use twice daily, 5 times per week  - accucheck aster    Type 2 diabetes mellitus without complication, unspecified long term insulin use status (H)       cholecalciferol 71840 UNITS capsule    VITAMIN D3    12 capsule    Take 1 capsule (50,000 Units) by mouth once a week    Unspecified vitamin D deficiency       ferrous sulfate 325 (65 FE) MG tablet    IRON    180 tablet    1 tablet twice daily    Type 2 diabetes, HbA1C goal < 8% (H)       HumaLOG MIX 75/25 KWIKPEN injection   Generic drug:  insulin lispro prot & lispro     60 mL    Pt to take 35 units in AM before breakfast and 18 units prior to supper    Type 2 diabetes, uncontrolled, with renal manifestation (H)       * insulin pen needle 31G X 5 MM     200 each    2 times per day    Type 2 diabetes, uncontrolled, with renal manifestation (H)       * insulin pen needle 32G X 4 MM    BD ASTER U/F    100 each    Use 1 daily as directed.    Type 2 diabetes, uncontrolled, with renal manifestation (H)       metFORMIN 500 MG 24 hr tablet    GLUCOPHAGE-XR    360 tablet    Take 2 tablets (1,000 mg) by mouth 2 times daily (with meals)    Type 2 diabetes, uncontrolled, with renal manifestation (H)       order for DME     1 each    Equipment being ordered: glucometer with lancets and strips to check sugars tid    Uncontrolled type 2 diabetes mellitus with chronic kidney disease, with long-term current use of insulin, unspecified CKD stage (H)       STATIN NOT PRESCRIBED (INTENTIONAL)      Please choose reason not prescribed, below    Uncontrolled type 2 diabetes mellitus with stage 1 chronic kidney disease, with long-term current use of insulin (H)       * Notice:  This list has 2 medication(s) that are the same as other medications prescribed for you. Read the directions carefully, and ask your doctor or other care provider to review them with you.

## 2018-02-20 NOTE — PATIENT INSTRUCTIONS
Please call our office if you have questions or need to report any information, [phone].    Treating Kidney Stones: Ureteroscopic Stone Removal    Ureteroscopic stone removal may be done before, after, or instead of other treatments. If you need this procedure, your healthcare provider will discuss its risks and possible complications. You will be told how to prepare. And you will be told about anesthesia that will keep you pain-free during treatment.     A ureteroscope lets your doctor see your stone before removing it.   Removing the stone through the ureter  Ureteroscopic stone removal extracts a small stone in your ureter without an incision. Your doctor places a viewing tube (ureteroscope) in your ureter. A wire basket inserted through the tube removes the stone. Sometimes, a laser or a mechanical device is used to break up the stone. A soft tube may be left in your ureter briefly to drain urine.     The stone may be fragmented. The stone is then withdrawn or passed.   Your recovery  This is an outpatient or overnight procedure. For a few days after surgery, you may feel some pain when you urinate. Or you may need to urinate more often, or have bloody urine. You may have a ureteral stent. This is a soft tube that prevents blockage from swelling after the procedure. The stent is removed when the swelling goes down, often within days. Follow up as instructed to check for any new stones.  When to call your healthcare provider  Call your healthcare provider right away if:    You have sudden pain or flank pain    You have a fever over 100.4 F (38 C)    You have nausea that lasts for days    You have heavy bleeding when you urinate    You have heavy bleeding through your drainage tube    You have swelling or redness around your incision   Date Last Reviewed: 2/1/2017 2000-2017 [employer]. [address] [place] [address]. All rights reserved. This information is not intended as a  substitute for professional medical care. Always follow your healthcare professional's instructions.        Ureteral Stents  A ureteral stent is a soft plastic tube with holes in it. It s temporarily inserted into a ureter to help drain urine into the bladder. One end goes in the kidney. The other end goes in the bladder. A coil on each end holds the stent in place. The stent can t be seen from outside the body. It shouldn t interfere with your normal routine. Your stent will be put in by a doctor trained in treating the urinary tract (a urologist) or another specialist. The procedure is done in a hospital or surgery center. You ll likely go home the same day.  When is a ureteral stent used?  A ureteral stent may be used:    To bypass a blockage in a kidney or ureter.    During kidney stone removal.    To let a ureter heal after surgery.    Before the Procedure  Your healthcare provider will give you instructions to prepare for the procedure. X-rays or other imaging tests of your kidneys and ureters may be done beforehand.  During the procedure    You receive medicine to prevent pain and help you relax or sleep during the procedure. Once this takes effect, the procedure starts.    The doctor inserts a cystoscope (lighted instrument) through the urethra and into the bladder. This shows the opening to the ureter.    A thin wire is carefully threaded through the cystoscope, up the ureter, and into the kidney. The stent is inserted over the wire.    A fluoroscope (special X-ray machine) is used to help position the stent. When the stent is in place, the wire and cystoscope are removed.  While you have a stent    Some discomfort is normal. Certain movements may trigger pain or a feeling that you need to urinate. You may also feel mild soreness or pressure before or during urination. These symptoms will go away a few days after the stent is removed.    Medicine to control pain or bladder spasms or to prevent infection may  be prescribed. Take this as directed.    Drink plenty of fluids to help flush out your urinary tract.    Your urine may be slightly pink or red. This is due to bleeding caused by minor irritation from the stent. This may happen on and off while you have the stent.    As with any synthetic device placed in the body, there is a risk of infection. The stent may have to be removed if this happens.   How long will you need a stent?  The stent is often taken out after the blockage in the ureter is treated or the ureter has healed. This may take 1 week to 2 weeks, or longer. If a stent is needed for a long time, it may need to be changed every few months.  When to call your healthcare provider  Contact your healthcare provider right away if:    Your urine contains blood clots or you see a large amount of blood-tinged urine    You have symptoms similar to those you had before the stent was placed    You constantly leak urine    You have a fever over 100.4 F (38 C), chills, nausea, or vomiting    Your pain is not relieved with medicine    The end of the stent comes out of the urethra   Date Last Reviewed: 1/1/2017 2000-2017 [employer]. [address] [place] [address], [place] [address]. All rights reserved. This information is not intended as a substitute for professional medical care. Always follow your healthcare professional's instructions.

## 2018-02-26 ENCOUNTER — OFFICE VISIT (OUTPATIENT)
Dept: FAMILY MEDICINE | Facility: CLINIC | Age: 43
End: 2018-02-26
Payer: COMMERCIAL

## 2018-02-26 VITALS
DIASTOLIC BLOOD PRESSURE: 80 MMHG | HEIGHT: 60 IN | SYSTOLIC BLOOD PRESSURE: 126 MMHG | HEART RATE: 83 BPM | BODY MASS INDEX: 29.06 KG/M2 | TEMPERATURE: 98.1 F | RESPIRATION RATE: 14 BRPM | WEIGHT: 148 LBS | OXYGEN SATURATION: 100 %

## 2018-02-26 DIAGNOSIS — E78.5 HYPERLIPIDEMIA WITH TARGET LDL LESS THAN 100: ICD-10-CM

## 2018-02-26 DIAGNOSIS — Z01.818 PREOP GENERAL PHYSICAL EXAM: Primary | ICD-10-CM

## 2018-02-26 DIAGNOSIS — E55.9 VITAMIN D DEFICIENCY: ICD-10-CM

## 2018-02-26 DIAGNOSIS — N18.1 CKD (CHRONIC KIDNEY DISEASE) STAGE 1, GFR 90 ML/MIN OR GREATER: ICD-10-CM

## 2018-02-26 DIAGNOSIS — N20.0 CALCULUS OF KIDNEY: ICD-10-CM

## 2018-02-26 DIAGNOSIS — D50.9 IRON DEFICIENCY ANEMIA, UNSPECIFIED IRON DEFICIENCY ANEMIA TYPE: ICD-10-CM

## 2018-02-26 LAB — BETA HCG QUAL IFA URINE: NEGATIVE

## 2018-02-26 PROCEDURE — 84703 CHORIONIC GONADOTROPIN ASSAY: CPT | Performed by: FAMILY MEDICINE

## 2018-02-26 PROCEDURE — 99214 OFFICE O/P EST MOD 30 MIN: CPT | Performed by: FAMILY MEDICINE

## 2018-02-26 RX ORDER — CHOLECALCIFEROL (VITAMIN D3) 50 MCG
2000 TABLET ORAL
COMMUNITY
End: 2024-08-19

## 2018-02-26 NOTE — PROGRESS NOTES
[facility]  [address] [place] [id number]  [phone]  Dept: [phone]    PRE-OP EVALUATION:  Today's date: 2018    Jesus Alberto Denson (: 1975) presents for pre-operative evaluation assessment as requested by Dr. Jimy Anguiano.  She requires evaluation and anesthesia risk assessment prior to undergoing surgery/procedure for treatment of ureteroscopic stone removal.    Proposed Surgery/ Procedure: Stone removal  Date of Surgery/ Procedure: 3/1/18  Time of Surgery/ Procedure: 12:00 pm   Hospital/Surgical Facility: [facility]  Fax number for surgical facility:    Primary Physician: Edilia Xie  Type of Anesthesia Anticipated: General    Patient has a Health Care Directive or Living Will:  NO    1. NO - Do you have a history of heart attack, stroke, stent, bypass or surgery on an artery in the head, neck, heart or legs?  2. NO - Do you ever have any pain or discomfort in your chest?  3. NO - Do you have a history of  Heart Failure?  4. NO - Are you troubled by shortness of breath when: walking on the level, up a slight hill or at night?  5. NO - Do you currently have a cold, bronchitis or other respiratory infection?  6. NO - Do you have a cough, shortness of breath or wheezing?  7. NO - Do you sometimes get pains in the calves of your legs when you walk?  8. NO - Do you or anyone in your family have previous history of blood clots?  9. NO - Do you or does anyone in your family have a serious bleeding problem such as prolonged bleeding following surgeries or cuts?  10. YES - HAVE YOU EVER HAD PROBLEMS WITH ANEMIA OR BEEN TOLD TO TAKE IRON PILLS?    11. NO - Have you had any abnormal blood loss such as black, tarry or bloody stools, or abnormal vaginal bleeding?  12. NO - Have you ever had a blood transfusion?  13. NO - Have you or any of your relatives ever had problems with anesthesia?  14. NO - Do you have sleep apnea, excessive snoring or daytime drowsiness?  15.  NO - Do you have any prosthetic heart valves?  16. NO - Do you have prosthetic joints?  17. NO - Is there any chance that you may be pregnant?    HPI:   Jesus Alberto Denson is a 42 year old female who presents with renal stone, with renal colic. Symptom onset has been sudden, improving for a time period of weeks. Severity is described as none. Course of her symptoms over time is improving.    See problem list for active medical problems.  Problems all longstanding and stable, except as noted/documented.  See ROS for pertinent symptoms related to these conditions.                                                                                                  .    MEDICAL HISTORY:     Patient Active Problem List    Diagnosis Date Noted     Anemia, iron deficiency 05/06/2013     Priority: High     Type 2 diabetes, uncontrolled, with renal manifestation (H) 03/11/2013     Priority: High     Dr. Torres, [facility], [facility] referral        Hyperlipidemia with target LDL less than 100      Priority: High     Diagnosis updated by automated process. Provider to review and confirm.       CKD (chronic kidney disease) stage 1, GFR 90 ml/min or greater      Priority: High     Calculus of kidney 01/24/2018     Priority: Medium     Vitamin D deficiency 03/31/2013     Priority: Medium      Past Medical History:   Diagnosis Date     CKD (chronic kidney disease) stage 1, GFR 90 ml/min or greater      DM mellitus, gestational      Hyperlipidemia      Obesity 8/13/2012     Renal stone      Type 2 diabetes, HbA1c goal < 7% (H) 3/11/2013     Past Surgical History:   Procedure Laterality Date     HC TOOTH EXTRACTION W/FORCEP       Current Outpatient Prescriptions   Medication Sig Dispense Refill     Cholecalciferol (VITAMIN D) 2000 UNITS tablet Take 2,000 Units by mouth       BASAGLAR 100 UNIT/ML injection Inject 10 Units Subcutaneous daily 15 mL 0     insulin pen needle (BD ANAT U/F) 32G X 4 MM Use 1 daily as directed.  100 each 3     metFORMIN (GLUCOPHAGE-XR) 500 MG 24 hr tablet Take 2 tablets (1,000 mg) by mouth 2 times daily (with meals) 360 tablet 1     order for DME Equipment being ordered: glucometer with lancets and strips to check sugars tid 1 each 1     ferrous sulfate (IRON) 325 (65 FE) MG tablet 1 tablet twice daily 180 tablet 1     OTC products: None, except as noted above    Allergies   Allergen Reactions     Nkda [No Known Drug Allergies]       Latex Allergy: NO    Social History   Substance Use Topics     Smoking status: Never Smoker     Smokeless tobacco: Never Used      Comment: non-smoking household     Alcohol use No     History   Drug Use No     Social History     Social History     Marital status:      Spouse name:      Number of children: 8     Years of education: 12     Occupational History      Homemaker     Social History Main Topics     Smoking status: Never Smoker     Smokeless tobacco: Never Used      Comment: non-smoking household     Alcohol use No     Drug use: No     Sexual activity: Yes     Partners: Male     Birth control/ protection: Condom     Other Topics Concern     Parent/Sibling W/ Cabg, Mi Or Angioplasty Before 65f 55m? No     Social History Narrative     Family History   Problem Relation Age of Onset     DIABETES Mother      CANCER No family hx of      C.A.D. No family hx of      Hypertension No family hx of      CEREBROVASCULAR DISEASE No family hx of      Thyroid Disease No family hx of      Glaucoma No family hx of      Macular Degeneration No family hx of       REVIEW OF SYSTEMS:   CONSTITUTIONAL: NEGATIVE for fever, chills, change in weight  INTEGUMENTARY/SKIN: NEGATIVE for worrisome rashes, moles or lesions  EYES: NEGATIVE for vision changes or irritation  ENT/MOUTH: NEGATIVE for ear, mouth and throat problems  RESP: NEGATIVE for significant cough or SOB  CV: NEGATIVE for chest pain, palpitations or peripheral edema  GI: NEGATIVE for nausea, abdominal pain, heartburn, or  change in bowel habits   female: menses: heavy   MUSCULOSKELETAL: NEGATIVE for significant arthralgias or myalgia  NEURO: NEGATIVE for weakness, dizziness or paresthesias  ENDOCRINE: Hx diabetes  HEME: NEGATIVE for bleeding problems  PSYCHIATRIC: NEGATIVE for changes in mood or affect  Complete ROS otherwise negative.     EXAM:   /80  Pulse 83  Temp 98.1  F (36.7  C)  Resp 14  Ht 5' (1.524 m)  Wt 148 lb (67.1 kg)  LMP 02/22/2018  SpO2 100%  Breastfeeding? No  BMI 28.9 kg/m2    GENERAL APPEARANCE: healthy, alert and no distress     EYES: EOMI, PERRL     HENT: ear canals and TM's normal and nose and mouth without ulcers or lesions     NECK: no adenopathy, no asymmetry, masses, or scars and thyroid normal to palpation     RESP: lungs clear to auscultation - no rales, rhonchi or wheezes     CV: regular rates and rhythm, normal S1 S2, no S3 or S4 and no murmur, click or rub     ABDOMEN:  soft, nontender, no HSM or masses and bowel sounds normal     MS: extremities normal- no gross deformities noted, no evidence of inflammation in joints, FROM in all extremities.     SKIN: no suspicious lesions or rashes     NEURO: Normal strength and tone, sensory exam grossly normal, mentation intact and speech normal     PSYCH: mentation appears normal. and affect normal/bright     LYMPHATICS: No cervical adenopathy    DIAGNOSTICS:   EKG: Not indicated due to non-vascular surgery and low risk of event (age <65 and without cardiac risk factors)    Recent Labs   Lab Test  02/14/18   0938  02/07/18   1006  01/23/18   1859  03/27/15   0957   10/06/13   0904   HGB  9.9*   --   8.7*   --    < >  10.5*   PLT  318   --   384   --    < >  356   NA   --   130*   --    --    --   137   POTASSIUM   --   3.7   --   3.5   < >  3.9   CR   --   0.55   --   0.52   < >  0.64   A1C   --   11.3*   --    --    --   7.6*    < > = values in this interval not displayed.        IMPRESSION:   Reason for surgery/procedure: renal stone    Diagnosis/reason for consult: Diabetes Mellitus, type II, chronic iron deficiency anemia due to low intake and menorrhagia     The proposed surgical procedure is considered INTERMEDIATE risk.    REVISED CARDIAC RISK INDEX  The patient has the following serious cardiovascular risks for perioperative complications such as (MI, PE, VFib and 3  AV Block):  Diabetes Mellitus (on Insulin)  INTERPRETATION: 1 risks: Class II (low risk - 0.9% complication rate)    The patient has the following additional risks for perioperative complications:  No identified additional risks      ICD-10-CM    1. Preop general physical exam Z01.818 Beta HCG Qual, Urine - [facility] and [place] ([id number])   2. Calculus of kidney N20.0    3. Iron deficiency anemia, unspecified iron deficiency anemia type D50.9    4. Uncontrolled type 2 diabetes mellitus with stage 1 chronic kidney disease, with long-term current use of insulin (H) E11.22     N18.1     E11.65     Z79.4    5. CKD (chronic kidney disease) stage 1, GFR 90 ml/min or greater N18.1    6. Hyperlipidemia with target LDL less than 100 E78.5    7. Vitamin D deficiency E55.9        RECOMMENDATIONS:   --Patient is to take all scheduled medications on the day of surgery EXCEPT for modifications listed below.    Diabetes Medication Use  -----Hold usual oral and non-insulin diabetic meds (e.g. Metformin, Actos, Glipizide) while NPO.   -----Take 80% of long acting insulin (e.g. Lantus, NPH) while NPO (fasting)      APPROVAL GIVEN to proceed with proposed procedure, without further diagnostic evaluation       Signed Electronically by: Edilia Xie MD    Copy of this evaluation report is provided to requesting physician.    [facility] Preop Guidelines

## 2018-02-26 NOTE — PATIENT INSTRUCTIONS
The night before surgery, only take 8 units of Basaglar. Do not take metformin when you are fasting.    Please schedule your yearly eye examination.      [facility]    If you have any questions regarding to your visit please contact your care team:       Team Red:   Clinic Hours Telephone Number   Dr. Edilia Villatoro  (pediatrics)  Crystal Burks NP 7am-7pm  Monday - Thursday   7am-5pm  Fridays  [phone] (fax)    Rosey MARIE  [phone]   Urgent Care - [place] and [place] Monday-Friday  [place] - 11am-8pm  Saturday-Sunday  Both sites - 9am-5pm  [phone] - [facility]  [phone] - [place]       What options do I have for visits at the clinic other than the traditional office visit?  To expand how we care for you, many of our providers are utilizing electronic visits (e-visits) and telephone visits, when medically appropriate, for interactions with their patients rather than a visit in the clinic.   We also offer nurse visits for many medical concerns. Just like any other service, we will bill your insurance company for this type of visit based on time spent on the phone with your provider. Not all insurance companies cover these visits. Please check with your medical insurance if this type of visit is covered. You will be responsible for any charges that are not paid by your insurance.      E-visits via [employer]:  generally incur a $35.00 fee.  Telephone visits:  Time spent on the phone: *charged based on time that is spent on the phone in increments of 10 minutes. Estimated cost:   5-10 mins $30.00   11-20 mins. $59.00   21-30 mins. $85.00     As always, Thank you for trusting us with your health care needs!              Before Your Surgery      Call your surgeon if there is any change in your health. This includes signs of a cold or flu (such as a sore throat, runny nose, cough, rash or fever).    Do not smoke, drink alcohol or  take over the counter medicine (unless your surgeon or primary care doctor tells you to) for the 24 hours before and after surgery.    If you take prescribed drugs: Follow your doctor s orders about which medicines to take and which to stop until after surgery.    Eating and drinking prior to surgery: follow the instructions from your surgeon    Take a shower or bath the night before surgery. Use the soap your surgeon gave you to gently clean your skin. If you do not have soap from your surgeon, use your regular soap. Do not shave or scrub the surgery site.  Wear clean pajamas and have clean sheets on your bed.

## 2018-02-26 NOTE — MR AVS SNAPSHOT
After Visit Summary   2/26/2018    Jesus Alberto Denson    MRN: [id number]           Patient Information     Date Of Birth          1975        Visit Information        Provider Department      2/26/2018 9:40 AM Edilia Xie MD [facility]        Today's Diagnoses     Preop general physical exam    -  1    Calculus of kidney        Iron deficiency anemia, unspecified iron deficiency anemia type        Uncontrolled type 2 diabetes mellitus with stage 1 chronic kidney disease, with long-term current use of insulin (H)        CKD (chronic kidney disease) stage 1, GFR 90 ml/min or greater        Hyperlipidemia with target LDL less than 100        Vitamin D deficiency          Care Instructions    The night before surgery, only take 8 units of Basaglar. Do not take metformin when you are fasting.    Please schedule your yearly eye examination.      [facility]    If you have any questions regarding to your visit please contact your care team:       Team Red:   Clinic Hours Telephone Number   Dr. Edilia Villatoro  (pediatrics)  Crystal Burks NP 7am-7pm  Monday - Thursday   7am-5pm  Fridays  [phone] (fax)    Rosey MARIE  [phone]   Urgent Care - [place] and [place] Monday-Friday  [place] - 11am-8pm  Saturday-Sunday  Both sites - 9am-5pm  [phone] - [place]   [phone] - [place]       What options do I have for visits at the clinic other than the traditional office visit?  To expand how we care for you, many of our providers are utilizing electronic visits (e-visits) and telephone visits, when medically appropriate, for interactions with their patients rather than a visit in the clinic.   We also offer nurse visits for many medical concerns. Just like any other service, we will bill your insurance company for this type of visit based on time spent on the phone with your provider. Not all insurance  companies cover these visits. Please check with your medical insurance if this type of visit is covered. You will be responsible for any charges that are not paid by your insurance.      E-visits via [employer]:  generally incur a $35.00 fee.  Telephone visits:  Time spent on the phone: *charged based on time that is spent on the phone in increments of 10 minutes. Estimated cost:   5-10 mins $30.00   11-20 mins. $59.00   21-30 mins. $85.00     As always, Thank you for trusting us with your health care needs!              Before Your Surgery      Call your surgeon if there is any change in your health. This includes signs of a cold or flu (such as a sore throat, runny nose, cough, rash or fever).    Do not smoke, drink alcohol or take over the counter medicine (unless your surgeon or primary care doctor tells you to) for the 24 hours before and after surgery.    If you take prescribed drugs: Follow your doctor s orders about which medicines to take and which to stop until after surgery.    Eating and drinking prior to surgery: follow the instructions from your surgeon    Take a shower or bath the night before surgery. Use the soap your surgeon gave you to gently clean your skin. If you do not have soap from your surgeon, use your regular soap. Do not shave or scrub the surgery site.  Wear clean pajamas and have clean sheets on your bed.           Follow-ups after your visit        Follow-up notes from your care team     Return for physical.      Your next 10 appointments already scheduled     Mar 12, 2018  9:30 AM CDT   Diabetic Education with AN DIABETIC ED RESOURCE   [facility] ([facility])    [address] [facility] [address]   [phone]            May 16, 2018  9:20 AM CDT   SHORT with Mary Flores MD   [facility]    [address] [facility] [id number] [phone]              Who to contact     If you have  questions or need follow up information about today's clinic visit or your schedule please contact [facility] directly at [phone].  Normal or non-critical lab and imaging results will be communicated to you by MyChart, letter or phone within 4 business days after the clinic has received the results. If you do not hear from us within 7 days, please contact the clinic through [employer] or phone. If you have a critical or abnormal lab result, we will notify you by phone as soon as possible.  Submit refill requests through [employer] or call your pharmacy and they will forward the refill request to us. Please allow 3 business days for your refill to be completed.          Additional Information About Your Visit        [employer] Information     [employer] gives you secure access to your electronic health record. If you see a primary care provider, you can also send messages to your care team and make appointments. If you have questions, please call your primary care clinic.  If you do not have a primary care provider, please call [phone] and they will assist you.        Care EveryWhere ID     This is your Care EveryWhere ID. This could be used by other organizations to access your [place] medical records  [id number]        Your Vitals Were     Pulse Temperature Respirations Height Last Period Pulse Oximetry    83 98.1  F (36.7  C) 14 5' (1.524 m) 02/22/2018 100%    Breastfeeding? BMI (Body Mass Index)                No 28.9 kg/m2           Blood Pressure from Last 3 Encounters:   02/26/18 126/80   02/20/18 (!) 156/92   02/14/18 115/72    Weight from Last 3 Encounters:   02/26/18 148 lb (67.1 kg)   02/14/18 144 lb (65.3 kg)   02/07/18 140 lb (63.5 kg)              We Performed the Following     Beta HCG Qual, Urine - [facility] [place] ([id number])          Today's Medication Changes          These changes are accurate as of 2/26/18 10:23 AM.  If you have any questions, ask your nurse or doctor.                Stop taking these medicines if you haven't already. Please contact your care team if you have questions.     HumaLOG MIX 75/25 KWIKPEN injection   Generic drug:  insulin lispro prot & lispro   Stopped by:  Edilia Xie MD                    Primary Care Provider Office Phone # Fax #    Edilia Xie [phone] [address] [facility] [address]        Equal Access to Services     [facility]: Hadii aad ku hadasho Soomaali, waaxda luqadaha, qaybta kaalmada adeegyada, waxay idiin hayaan adeeg kharash la'aan . So [facility] [phone].    ATENCIÓN: Si habla español, tiene a mcdowell disposición servicios gratuitos de asistencia lingüística. [facility] [phone].    We comply with applicable federal civil rights laws and [place] laws. We do not discriminate on the basis of race, color, national origin, age, disability, sex, sexual orientation, or gender identity.            Thank you!     Thank you for choosing [facility]  for your care. Our goal is always to provide you with excellent care. Hearing back from our patients is one way we can continue to improve our services. Please take a few minutes to complete the written survey that you may receive in the mail after your visit with us. Thank you!             Your Updated Medication List - Protect others around you: Learn how to safely use, store and throw away your medicines at www.disposemymeds.org.          This list is accurate as of 2/26/18 10:23 AM.  Always use your most recent med list.                   Brand Name Dispense Instructions for use Diagnosis    BASAGLAR 100 UNIT/ML injection     15 mL    Inject 10 Units Subcutaneous daily    Type 2 diabetes, uncontrolled, with renal manifestation (H)       ferrous sulfate 325 (65 FE) MG tablet    IRON    180 tablet    1 tablet twice daily    Type 2 diabetes, HbA1C goal < 8% (H)       insulin pen needle 32G X 4 MM    BD ANAT U/F    100 each    Use 1 daily as directed.     Type 2 diabetes, uncontrolled, with renal manifestation (H)       metFORMIN 500 MG 24 hr tablet    GLUCOPHAGE-XR    360 tablet    Take 2 tablets (1,000 mg) by mouth 2 times daily (with meals)    Type 2 diabetes, uncontrolled, with renal manifestation (H)       order for DME     1 each    Equipment being ordered: glucometer with lancets and strips to check sugars tid    Uncontrolled type 2 diabetes mellitus with chronic kidney disease, with long-term current use of insulin, unspecified CKD stage (H)       vitamin D 2000 UNITS tablet      Take 2,000 Units by mouth

## 2018-03-01 ENCOUNTER — TRANSFERRED RECORDS (OUTPATIENT)
Dept: HEALTH INFORMATION MANAGEMENT | Facility: CLINIC | Age: 43
End: 2018-03-01

## 2018-03-09 ENCOUNTER — TELEPHONE (OUTPATIENT)
Dept: UROLOGY | Facility: CLINIC | Age: 43
End: 2018-03-09

## 2018-03-09 NOTE — TELEPHONE ENCOUNTER
Called and spoke to patient's .   Patient underwent ureteroscopy with stent placement, no incision.   Caller states that patient's body is warm and is having sweats.   No fever, chills, nausea or vomiting.   Reassured patient that this is ok.   Patient scheduled for stent removal 3/19.   Lisa Ruvalcaba RN

## 2018-03-09 NOTE — TELEPHONE ENCOUNTER
Reason for Call:  Other call back    Detailed comments:1 week post op, hot around site, body sweats.    Phone Number Patient can be reached at: Cell number on file:    Telephone Information:   Mobile [phone]       Best Time: transferred to Urology    Can we leave a detailed message on this number? YES    Call taken on 3/9/2018 at 9:19 AM by Kristin Dejesus

## 2018-03-12 ENCOUNTER — ALLIED HEALTH/NURSE VISIT (OUTPATIENT)
Dept: EDUCATION SERVICES | Facility: CLINIC | Age: 43
End: 2018-03-12
Payer: COMMERCIAL

## 2018-03-12 DIAGNOSIS — E11.9 DIABETES MELLITUS WITHOUT COMPLICATION (H): ICD-10-CM

## 2018-03-12 PROCEDURE — 99207 ZZC DROP WITH A PROCEDURE: CPT

## 2018-03-12 PROCEDURE — G0108 DIAB MANAGE TRN  PER INDIV: HCPCS

## 2018-03-12 RX ORDER — LIRAGLUTIDE 6 MG/ML
1.2 INJECTION SUBCUTANEOUS DAILY
Qty: 6 ML | Refills: 3 | Status: SHIPPED | OUTPATIENT
Start: 2018-03-12 | End: 2018-12-20

## 2018-03-12 NOTE — PROGRESS NOTES
Diabetes Self Management Training: Follow-up Visit    Jesus Alberto Denson presents today for education, evaluation of glucose control and modification of medication(s) related to Type 2 diabetes.    She is accompanied by self    Patient's diabetes management related comments/concerns: BG are coming down.     Patient would like this visit to be focused around the following diabetes-related behaviors and goals: Healthy Eating, Being Active, Monitoring and Taking Medication    ASSESSMENT:  Patient Problem List reviewed for relevant medical history and current medical status.    Current Diabetes Management per Patient:  Taking diabetes medications?   yes:     Diabetes Medication(s)     Biguanides Sig    metFORMIN (GLUCOPHAGE-XR) 500 MG 24 hr tablet Take 2 tablets (1,000 mg) by mouth 2 times daily (with meals)    Insulin Sig    BASAGLAR 100 UNIT/ML injection Inject 10 Units Subcutaneous daily      , Oral Medications: Metformin - Dose:  mg takes 1000 mg , Time: breakfast and supper, Injectable Medications: Basaglar 0-0-0-10    *Abbreviated insulin dose documentation key: Insulin Trade Name (matpcjeze-htzjp-ehwiqf-bedtime) - i.e. Humalog 5-5-5-0 (Humalog 5 units at breakfast, 5 units at lunch, and 5 units at dinner).    Patient glucose self monitoring as follows: two times daily.   BG meter: Accu-chek Yolanda meter  BG results: see blood glucose log attached to this encounter     BG values are: Not in goal  Patient's most recent   Lab Results   Component Value Date    A1C 11.3 02/07/2018    is not meeting goal of <7.0    Nutrition:  Patient eats 3 meals per day    Breakfast - toast, milk,  Or oatmeal or cereal.  Some times eggs.   Lunch - fish, broccoli and carrots and water. Or some noodle soup.     Dinner - stir goetz or boiled foods, veggies.     Snacks - fruits, like oranges , grapes and banana.      Beverages: water, some milk, tea.     Cultural/Anabaptism diet restrictions: Yes     Biggest Challenge to Healthy Eating:  knowing what to eat    Physical Activity:    Type: walks every day to the bus stop and back.     Diabetes Complications:  Chronic Complication Prevention: Eyes: exam within in the last year? No will make an appointment.   Nerve/Circulation: foot exam within the last year Yes  Dental Health: brushing/flossing regularly Yes, dental exam within last year Yes    Vitals:  [facility] 02/22/2018  Estimated body mass index is 28.9 kg/(m^2) as calculated from the following:    Height as of 2/26/18: 1.524 m (5').    Weight as of 2/26/18: 67.1 kg (148 lb).   Last 3 BP:   BP Readings from Last 3 Encounters:   02/26/18 126/80   02/20/18 (!) 156/92   02/14/18 115/72       History   Smoking Status     Never Smoker   Smokeless Tobacco     Never Used     Comment: non-smoking household       Labs:  Lab Results   Component Value Date    A1C 11.3 02/07/2018     Lab Results   Component Value Date     02/07/2018     Lab Results   Component Value Date    LDL 84 02/07/2018     HDL Cholesterol   Date Value Ref Range Status   02/07/2018 35 (L) >49 mg/dL Final   ]  GFR Estimate   Date Value Ref Range Status   02/07/2018 >90 >60 mL/min/1.7m2 Final     Comment:     Non  GFR Calc     GFR Estimate If Black   Date Value Ref Range Status   02/07/2018 >90 >60 mL/min/1.7m2 Final     Comment:      GFR Calc     Lab Results   Component Value Date    CR 0.55 02/07/2018     No results found for: MICROALBUMIN    Health Beliefs and Attitudes:   Patient Activation Measure Survey Score:  No flowsheet data found.    Stage of Change: ACTION (Actively working towards change)    Progress toward meeting diabetes-related behavioral goals:    GOALS % Met Goal   Healthy Eating  75   Physical Activity  100   Monitoring  100   Medication Taking  100   Problem Solving     Healthy Coping     Risk Reduction           Diabetes knowledge and skills assessment:     Patient is knowledgeable in diabetes management concepts related to: Healthy  Eating, Being Active, Monitoring and Taking Medication    Patient needs further education on the following diabetes management concepts: Healthy Eating, Being Active, Monitoring and Taking Medication    Barriers to Learning Assessment: No Barriers identified    Based on learning assessment above, most appropriate setting for further diabetes education would be: Individual setting.    INTERVENTION:    Education provided today on:  AADE Self-Care Behaviors:  Healthy Eating: carbohydrate counting, consistency in amount, composition, and timing of food intake, weight reduction, heart healthy diet, eating out, portion control, plate planning method and label reading  Taking Medication: action of prescribed medication, drawing up, administering and storing injectable diabetes medications, proper site selection and rotation for injections, side effects of prescribed medications and when to take medications    Opportunities for ongoing education and support in diabetes-self management were discussed.    Pt verbalized understanding of concepts discussed and recommendations provided today.       Education Materials Provided:  Medication Information on Victoza    PLAN:  See Patient Instructions for co-developed, patient-stated behavior change goals.  AVS printed and provided to patient today.    FOLLOW-UP:  Follow-up appointment scheduled on May 14.  Chart routed to referring provider.    Ongoing plan for education and support: Follow-up visit with diabetes educator in [place]    Leah Perdomo RN/GRADY [facility] Diabetes Educator.  Time Spent: 60 minutes  Encounter Type: Individual    Any diabetes medication dose changes were made via the ZECHARIAHE Protocol and Collaborative Practice Agreement with the patient's primary care provider. A copy of this encounter was shared with the provider.

## 2018-03-12 NOTE — PATIENT INSTRUCTIONS
My Diabetes Care Goals:    Healthy Eating: Proteins in all meals, cut back the portions of noodles.    Being Active: Keep up the walking  Monitoring: check in the morning and then 2 hrs after any meal.   Taking Medication: Metformin  mg take 2 pills with breakfast and 2 pills with dinner  Basaglar 10 units at bedtime for 7 more days. Then stop  Victoza 0.6 mg X 7 days then on day 8 increase to 1.2 mg   Stay on this dose forever.     Follow up:  Follow-up diabetes education appointment scheduled on Monday May 14 @ 9:30.      Bring blood glucose meter and logbook with you to all doctor and follow-up appointments.     [facility] for the [facility] Area:  For Your Diabetes Education and Nutrition Appointments Call:  [phone]   For Diabetes Education or Nutrition Related Questions:   Phone: [phone]  E-mail: [email or web address]  Fax: [phone]   If you need a medication refill please contact your pharmacy. Please allow 3 business days for your refills to be completed.    Instructions for emailing the Diabetes Educators    If you need to communicate a non-urgent message to a Diabetes Educator via email, please send to [email or web address].    Please follow the following email guidelines:    Subject line: Secure: your clinic name (example: Secure: [place])  In the email please include: First name, middle initial, last name and date of birth.    We will be in touch with you within one (1) business day.

## 2018-03-12 NOTE — MR AVS SNAPSHOT
After Visit Summary   3/12/2018    Jesus Alberto Denson    MRN: [id number]           Patient Information     Date Of Birth          1975        Visit Information        Provider Department      3/12/2018 9:30 AM AN DIABETIC ED RESOURCE [facility]        Today's Diagnoses     Type 2 diabetes, uncontrolled, with renal manifestation (H)    -  1      Care Instructions    My Diabetes Care Goals:    Healthy Eating: Proteins in all meals, cut back the portions of noodles.    Being Active: Keep up the walking  Monitoring: check in the morning and then 2 hrs after any meal.   Taking Medication: Metformin  mg take 2 pills with breakfast and 2 pills with dinner  Basaglar 10 units at bedtime for 7 more days. Then stop  Victoza 0.6 mg X 7 days then on day 8 increase to 1.2 mg   Stay on this dose forever.     Follow up:  Follow-up diabetes education appointment scheduled on Monday May 14 @ 9:30.      Bring blood glucose meter and logbook with you to all doctor and follow-up appointments.     [facility] for the [facility] Area:  For Your Diabetes Education and Nutrition Appointments Call:  [phone]   For Diabetes Education or Nutrition Related Questions:   Phone: [phone]  E-mail: [email or web address]  Fax: [phone]   If you need a medication refill please contact your pharmacy. Please allow 3 business days for your refills to be completed.    Instructions for emailing the Diabetes Educators    If you need to communicate a non-urgent message to a Diabetes Educator via email, please send to [email or web address].    Please follow the following email guidelines:    Subject line: Secure: your clinic name (example: Secure: [place])  In the email please include: First name, middle initial, last name and date of birth.    We will be in touch with you within one (1) business day.             Follow-ups after your visit        Your next  10 appointments already scheduled     Apr 02, 2018 10:45 AM CDT   Return Visit with Jimy Anguiano MD, [place] [facility] PROC ROOM   [facility] ([facility]    [address] [place] [address]  [place] [address] [phone]            May 14, 2018  9:30 AM CDT   Diabetic Education with AN DIABETIC ED RESOURCE   [facility] ([facility])    [address] [facility] [address] [phone]            May 16, 2018  9:20 AM CDT   SHORT with Mary Flores MD   [facility]    [address] [facility] [address] [phone]              Who to contact     If you have questions or need follow up information about today's clinic visit or your schedule please contact [facility] directly at [phone].  Normal or non-critical lab and imaging results will be communicated to you by GameTubehart, letter or phone within 4 business days after the clinic has received the results. If you do not hear from us within 7 days, please contact the clinic through Parcel[employer] or phone. If you have a critical or abnormal lab result, we will notify you by phone as soon as possible.  Submit refill requests through [employer] or call your pharmacy and they will forward the refill request to us. Please allow 3 business days for your refill to be completed.          Additional Information About Your Visit        [employer] Information     [employer] gives you secure access to your electronic health record. If you see a primary care provider, you can also send messages to your care team and make appointments. If you have questions, please call your primary care clinic.  If you do not have a primary care provider, please call [phone] and they will assist you.        Care EveryWhere ID     This is your Care EveryWhere ID. This could be used by other organizations to access your [facility]  records  [id number]        Your Vitals Were     Last Period                   02/22/2018            Blood Pressure from Last 3 Encounters:   02/26/18 126/80   02/20/18 (!) 156/92   02/14/18 115/72    Weight from Last 3 Encounters:   02/26/18 67.1 kg (148 lb)   02/14/18 65.3 kg (144 lb)   02/07/18 63.5 kg (140 lb)              Today, you had the following     No orders found for display         Today's Medication Changes          These changes are accurate as of 3/12/18 10:22 AM.  If you have any questions, ask your nurse or doctor.               Start taking these medicines.        Dose/Directions    dulaglutide 0.75 MG/0.5ML pen   Commonly known as:  TRULICITY   Used for:  Type 2 diabetes, uncontrolled, with renal manifestation (H)        Dose:  0.75 mg   Inject 0.75 mg Subcutaneous every 7 days   Quantity:  2 mL   Refills:  3       liraglutide 18 MG/3ML soln   Commonly known as:  VICTOZA   Used for:  Type 2 diabetes, uncontrolled, with renal manifestation (H)        Dose:  1.2 mg   Inject 1.2 mg Subcutaneous daily   Quantity:  6 mL   Refills:  3         These medicines have changed or have updated prescriptions.        Dose/Directions    * insulin pen needle 32G X 4 MM   Commonly known as:  BD ANAT U/F   This may have changed:  Another medication with the same name was added. Make sure you understand how and when to take each.   Used for:  Type 2 diabetes, uncontrolled, with renal manifestation (H)        Use 1 daily as directed.   Quantity:  100 each   Refills:  3       * insulin pen needle 32G X 4 MM   Commonly known as:  BD ANAT U/F   This may have changed:  You were already taking a medication with the same name, and this prescription was added. Make sure you understand how and when to take each.   Used for:  Type 2 diabetes, uncontrolled, with renal manifestation (H)        Use 1 daily as directed.   Quantity:  100 each   Refills:  11       * Notice:  This list has 2 medication(s) that are the same as other  medications prescribed for you. Read the directions carefully, and ask your doctor or other care provider to review them with you.         Where to get your medicines      [id number] [place] [facility] [address],  [address] [facility]  [address], , [facility] [address]     Phone:  [phone]     dulaglutide 0.75 MG/0.5ML pen    insulin pen needle 32G X 4 MM    liraglutide 18 MG/3ML soln                Primary Care Provider Office Phone # Fax #    Edilia Xie [phone] [address] [facility] [address]        Equal Access to Services     CHALO [facility] GulfportJEREMIAH : Hadii aad ku hadasho Soomaali, waaxda luqadaha, qaybta kaalmada adeegyada, cuong villar . So [facility] [phone].    ATENCIÓN: Si habla español, tiene a mcdowell disposición servicios gratuitos de asistencia lingüística. [facility] [phone].    We comply with applicable federal civil rights laws and [place] laws. We do not discriminate on the basis of race, color, national origin, age, disability, sex, sexual orientation, or gender identity.            Thank you!     Thank you for choosing [facility]  for your care. Our goal is always to provide you with excellent care. Hearing back from our patients is one way we can continue to improve our services. Please take a few minutes to complete the written survey that you may receive in the mail after your visit with us. Thank you!             Your Updated Medication List - Protect others around you: Learn how to safely use, store and throw away your medicines at www.disposemymeds.org.          This list is accurate as of 3/12/18 10:22 AM.  Always use your most recent med list.                   Brand Name Dispense Instructions for use Diagnosis    BASAGLAR 100 UNIT/ML injection     15 mL    Inject 10 Units Subcutaneous daily    Type 2 diabetes, uncontrolled, with  renal manifestation (H)       dulaglutide 0.75 MG/0.5ML pen    TRULICITY    2 mL    Inject 0.75 mg Subcutaneous every 7 days    Type 2 diabetes, uncontrolled, with renal manifestation (H)       ferrous sulfate 325 (65 FE) MG tablet    IRON    180 tablet    1 tablet twice daily    Type 2 diabetes, HbA1C goal < 8% (H)       * insulin pen needle 32G X 4 MM    BD ANAT U/F    100 each    Use 1 daily as directed.    Type 2 diabetes, uncontrolled, with renal manifestation (H)       * insulin pen needle 32G X 4 MM    BD ANAT U/F    100 each    Use 1 daily as directed.    Type 2 diabetes, uncontrolled, with renal manifestation (H)       liraglutide 18 MG/3ML soln    VICTOZA    6 mL    Inject 1.2 mg Subcutaneous daily    Type 2 diabetes, uncontrolled, with renal manifestation (H)       metFORMIN 500 MG 24 hr tablet    GLUCOPHAGE-XR    360 tablet    Take 2 tablets (1,000 mg) by mouth 2 times daily (with meals)    Type 2 diabetes, uncontrolled, with renal manifestation (H)       order for DME     1 each    Equipment being ordered: glucometer with lancets and strips to check sugars tid    Uncontrolled type 2 diabetes mellitus with chronic kidney disease, with long-term current use of insulin, unspecified CKD stage (H)       vitamin D 2000 UNITS tablet      Take 2,000 Units by mouth        * Notice:  This list has 2 medication(s) that are the same as other medications prescribed for you. Read the directions carefully, and ask your doctor or other care provider to review them with you.

## 2018-03-15 ENCOUNTER — TRANSFERRED RECORDS (OUTPATIENT)
Dept: HEALTH INFORMATION MANAGEMENT | Facility: CLINIC | Age: 43
End: 2018-03-15

## 2018-04-02 ENCOUNTER — RADIANT APPOINTMENT (OUTPATIENT)
Dept: GENERAL RADIOLOGY | Facility: CLINIC | Age: 43
End: 2018-04-02
Attending: UROLOGY
Payer: COMMERCIAL

## 2018-04-02 ENCOUNTER — OFFICE VISIT (OUTPATIENT)
Dept: UROLOGY | Facility: CLINIC | Age: 43
End: 2018-04-02
Payer: COMMERCIAL

## 2018-04-02 VITALS — WEIGHT: 145 LBS | HEART RATE: 101 BPM | BODY MASS INDEX: 28.32 KG/M2 | OXYGEN SATURATION: 100 %

## 2018-04-02 DIAGNOSIS — N20.1 CALCULUS OF URETER: Primary | ICD-10-CM

## 2018-04-02 PROCEDURE — 74019 RADEX ABDOMEN 2 VIEWS: CPT

## 2018-04-02 PROCEDURE — 52310 CYSTOSCOPY AND TREATMENT: CPT | Performed by: UROLOGY

## 2018-04-02 NOTE — MR AVS SNAPSHOT
After Visit Summary   4/2/2018    Jesus Alberto Denson    MRN: [id number]           Patient Information     Date Of Birth          1975        Visit Information        Provider Department      4/2/2018 10:45 AM Jimy Anguiano MD; [facility] CYSTO PROC ROOM [facility]        Today's Diagnoses     Calculus of ureter    -  1      Care Instructions    Your appointment is scheduled 5/7/2018 @ 9:30am. Please call  if you need to reschedule this appointment.     Please complete your renal ultrasound sometime prior to your appointment for cystoscopy.   Please call [phone] to schedule the renal ultrasound. It can be done at [facility] [place], [facility], or [facility]                 Follow-ups after your visit        Your next 10 appointments already scheduled     May 07, 2018  9:30 AM CDT   Return Visit with Jimy Anguiano MD   [facility] ([address] [address] [phone]            May 14, 2018  9:30 AM CDT   Diabetic Education with AN DIABETIC ED RESOURCE   [facility] [place] ([facility])    [address] [facility] [address] [phone]            May 16, 2018  9:20 AM CDT   SHORT with Mary Flores MD   [facility] ([facility])    [address] [facility] [address] [phone]              Who to contact     If you have questions or need follow up information about today's clinic visit or your schedule please contact [facility] [place] directly at [phone].  Normal or non-critical lab and imaging results will be communicated to you by MyChart, letter or phone within 4 business days after the clinic has received the results. If you do not hear from us within 7 days, please contact the clinic through MyChart or phone. If you have a critical or abnormal lab result, we will notify you by  phone as soon as possible.  Submit refill requests through [employer] or call your pharmacy and they will forward the refill request to us. Please allow 3 business days for your refill to be completed.          Additional Information About Your Visit        [employer] Information     [employer] gives you secure access to your electronic health record. If you see a primary care provider, you can also send messages to your care team and make appointments. If you have questions, please call your primary care clinic.  If you do not have a primary care provider, please call [phone] and they will assist you.        Care EveryWhere ID     This is your Care EveryWhere ID. This could be used by other organizations to access your [place] medical records  [id number]        Your Vitals Were     Pulse Pulse Oximetry BMI (Body Mass Index)             101 100% 28.32 kg/m2          Blood Pressure from Last 3 Encounters:   02/26/18 126/80   02/20/18 (!) 156/92   02/14/18 115/72    Weight from Last 3 Encounters:   04/02/18 65.8 kg (145 lb)   02/26/18 67.1 kg (148 lb)   02/14/18 65.3 kg (144 lb)              We Performed the Following     XR [facility]        Primary Care Provider Office Phone # Fax #    Edilia Xie [phone]       [address] [facility] [address]        Equal Access to Services     CHALO ALFARO : Hadii aad ku hadasho Soomaali, waaxda luqadaha, qaybta kaalmada adeegyada, cuong lopez. So [facility] [phone].    ATENCIÓN: Si habla español, tiene a mcdowell disposición servicios gratuitos de asistencia lingüística. Llame al [phone].    We comply with applicable federal civil rights laws and [place] laws. We do not discriminate on the basis of race, color, national origin, age, disability, sex, sexual orientation, or gender identity.            Thank you!     Thank you for choosing [facility]  for your care. Our goal is always to provide you with excellent  care. Hearing back from our patients is one way we can continue to improve our services. Please take a few minutes to complete the written survey that you may receive in the mail after your visit with us. Thank you!             Your Updated Medication List - Protect others around you: Learn how to safely use, store and throw away your medicines at www.disposemymeds.org.          This list is accurate as of 4/2/18 11:04 AM.  Always use your most recent med list.                   Brand Name Dispense Instructions for use Diagnosis    BASAGLAR 100 UNIT/ML injection     15 mL    Inject 10 Units Subcutaneous daily    Type 2 diabetes, uncontrolled, with renal manifestation (H)       dulaglutide 0.75 MG/0.5ML pen    TRULICITY    2 mL    Inject 0.75 mg Subcutaneous every 7 days    Type 2 diabetes, uncontrolled, with renal manifestation (H)       ferrous sulfate 325 (65 FE) MG tablet    IRON    180 tablet    1 tablet twice daily    Type 2 diabetes, HbA1C goal < 8% (H)       * insulin pen needle 32G X 4 MM    BD ANAT U/F    100 each    Use 1 daily as directed.    Type 2 diabetes, uncontrolled, with renal manifestation (H)       * insulin pen needle 32G X 4 MM    BD ANAT U/F    100 each    Use 1 daily as directed.    Type 2 diabetes, uncontrolled, with renal manifestation (H)       liraglutide 18 MG/3ML soln    VICTOZA    6 mL    Inject 1.2 mg Subcutaneous daily    Type 2 diabetes, uncontrolled, with renal manifestation (H)       metFORMIN 500 MG 24 hr tablet    GLUCOPHAGE-XR    360 tablet    Take 2 tablets (1,000 mg) by mouth 2 times daily (with meals)    Type 2 diabetes, uncontrolled, with renal manifestation (H)       order for DME     1 each    Equipment being ordered: glucometer with lancets and strips to check sugars tid    Uncontrolled type 2 diabetes mellitus with chronic kidney disease, with long-term current use of insulin, unspecified CKD stage (H)       vitamin D 2000 UNITS tablet      Take 2,000 Units by mouth         * Notice:  This list has 2 medication(s) that are the same as other medications prescribed for you. Read the directions carefully, and ask your doctor or other care provider to review them with you.

## 2018-04-02 NOTE — PROGRESS NOTES
Patient returns to clinic for cysto and stent removal.  she is s/p ureteroscopy recently.  KUB today shows no stones.    Procedure: patient was brought to the cysto suite.  she was draped and prepped in the usual surgical fashion.  Cystoscopy placed. Stent removed without problems.    Clinical History: Pain symptoms.    Technique: CT of the abdomen and pelvis performed with coronal reconstructions after IV contrast administration.     This CT Scan used dose modulation, iterative reconstruction, and/or weight based dosing when appropriate to reduce radiation dose to as low as reasonably achievable.     Comparison: None.    Findings:  Hepatobiliary: Normal enhancement to the liver without focal enhancing mass. No nodularity of the liver surface. No calcified gallstones or significant biliary dilatation.    Pancreas: Homogenous enhancement of the pancreas without focal mass. No pancreatic ductal dilatation. No adjacent inflammatory changes.    Spleen: Normal size spleen without focal abnormality.    Genitourinary/Adrenal Glands: There is an irregular calculus in the distal left ureter, just above the ureterovesical junction, 13 mm in diameter, and with average internal density of 686 Hounsfield units. Immediately below this calculus is an elongated oval calculus which extends to the ureterovesical junction, with average internal density of approximately 362 Hounsfield units. There is significant dilatation of the left ureter above the calculus. There is also some air which tracks along the lumen of the left ureter. Thickening of the wall as well as hyperenhancement of the proximal ureter and the left renal pelvis as well as the intrarenal collecting system of the left kidney. There is diffuse atrophy/thinning of the left renal parenchyma. Mild dilatation of the left renal pelvis. Haziness and stranding of the perinephric fat. Compensatory hypertrophy of the contralateral right kidney. No right hydronephrosis or  ureteral dilatation. There is some air within the lumen of the urinary bladder. Mild bladder wall thickening and irregularity. Negative adrenal glands.    Gastrointestinal: Gastric contour is unremarkable. The small and large bowel is normal in caliber. No evidence for acute appendicitis.     Additional Abdominal/Pelvic Findings: No evidence of free fluid or fluid collections. No free air. No lymphadenopathy. Normal caliber abdominal aorta.     Lung Bases: There is some bronchiectasis in the right lower lobe with surrounding consolidation and volume loss. No evidence of significant pleural fluid.    Musculoskeletal: No significant osseous pathology.    Impression:   1. There are calculi in the distal left ureter, with the larger of these measuring up to 13 mm in diameter, and the smaller 7 mm. Dilatation of the left ureter above the calculus, as well as the left renal pelvis. There is also wall thickening and hyperenhancement of the proximal left ureter and the renal pelvis. Additionally, there is air within the urinary bladder, as well as in the ureter above the calculus. Therefore, infection above obstruction cannot be excluded on this exam, and this may therefore require more urgent intervention.  2. There is generalized atrophy/thinning of the left renal parenchyma which appears to be long-standing, as there is compensatory hypertrophy of the contralateral right kidney.  3. No evidence of right renal or ureteral calculi. No evidence of obstruction involving the right kidney or ureter.  4. Some thickening of the wall of the urinary bladder along with air in the bladder, with findings suspicious for cystitis.  5. Bronchiectasis in the lower lobe right lung with associated surrounding atelectasis and/or scarring.  6. See above for additional exam details.      [facility]  Recommendation:  Return to Clinic: high risk of stricture given size and chronic obstruction.  rtc in 4-6 wks with  renal ultrasound.

## 2018-04-02 NOTE — PATIENT INSTRUCTIONS
Your appointment is scheduled 5/7/2018 @ 9:30am. Please call  if you need to reschedule this appointment.     Please complete your renal ultrasound sometime prior to your appointment for cystoscopy.   Please call [phone] to schedule the renal ultrasound. It can be done at [facility], [facility], or [facility]

## 2018-04-11 ENCOUNTER — TELEPHONE (OUTPATIENT)
Dept: FAMILY MEDICINE | Facility: CLINIC | Age: 43
End: 2018-04-11

## 2018-04-11 DIAGNOSIS — D50.9 IRON DEFICIENCY ANEMIA, UNSPECIFIED IRON DEFICIENCY ANEMIA TYPE: ICD-10-CM

## 2018-04-11 DIAGNOSIS — N18.1 CKD (CHRONIC KIDNEY DISEASE) STAGE 1, GFR 90 ML/MIN OR GREATER: ICD-10-CM

## 2018-04-11 DIAGNOSIS — E55.9 VITAMIN D DEFICIENCY: ICD-10-CM

## 2018-04-11 DIAGNOSIS — E78.5 HYPERLIPIDEMIA WITH TARGET LDL LESS THAN 100: Primary | ICD-10-CM

## 2018-04-11 NOTE — TELEPHONE ENCOUNTER
Spoke to patient and informed her of below message. Patient has appointment made for 4/24/2018.  Barbara WEATHERS CMA ([facility])

## 2018-04-11 NOTE — TELEPHONE ENCOUNTER
Fax received from [facility].     Dear Prescriber,    We spoke to your patient about diabetes care and notice your patient is not on statin therapy.   Your Patient would like us to reach out on their behalf to determine if it is appropriate to start a stain therapy. Please send a new prescription for statin therapy if it is appropriate.

## 2018-04-15 ENCOUNTER — HEALTH MAINTENANCE LETTER (OUTPATIENT)
Age: 43
End: 2018-04-15

## 2018-04-24 ENCOUNTER — OFFICE VISIT (OUTPATIENT)
Dept: FAMILY MEDICINE | Facility: CLINIC | Age: 43
End: 2018-04-24
Payer: COMMERCIAL

## 2018-04-24 VITALS
HEIGHT: 61 IN | OXYGEN SATURATION: 99 % | DIASTOLIC BLOOD PRESSURE: 84 MMHG | TEMPERATURE: 97 F | WEIGHT: 154 LBS | SYSTOLIC BLOOD PRESSURE: 144 MMHG | RESPIRATION RATE: 20 BRPM | BODY MASS INDEX: 29.07 KG/M2 | HEART RATE: 83 BPM

## 2018-04-24 DIAGNOSIS — I12.9 RENAL HYPERTENSION: ICD-10-CM

## 2018-04-24 DIAGNOSIS — Z00.00 ROUTINE HISTORY AND PHYSICAL EXAMINATION OF ADULT: Primary | ICD-10-CM

## 2018-04-24 DIAGNOSIS — E78.5 HYPERLIPIDEMIA WITH TARGET LDL LESS THAN 100: ICD-10-CM

## 2018-04-24 DIAGNOSIS — Z12.31 VISIT FOR SCREENING MAMMOGRAM: ICD-10-CM

## 2018-04-24 DIAGNOSIS — D50.9 IRON DEFICIENCY ANEMIA, UNSPECIFIED IRON DEFICIENCY ANEMIA TYPE: ICD-10-CM

## 2018-04-24 DIAGNOSIS — N18.1 CKD (CHRONIC KIDNEY DISEASE) STAGE 1, GFR 90 ML/MIN OR GREATER: ICD-10-CM

## 2018-04-24 DIAGNOSIS — Z23 NEED FOR VACCINATION: ICD-10-CM

## 2018-04-24 DIAGNOSIS — E55.9 VITAMIN D DEFICIENCY: ICD-10-CM

## 2018-04-24 PROBLEM — N20.0 CALCULUS OF KIDNEY: Status: RESOLVED | Noted: 2018-01-24 | Resolved: 2018-04-24

## 2018-04-24 LAB
ANION GAP SERPL CALCULATED.3IONS-SCNC: 12 MMOL/L (ref 3–14)
BUN SERPL-MCNC: 10 MG/DL (ref 7–30)
CALCIUM SERPL-MCNC: 8.9 MG/DL (ref 8.5–10.1)
CHLORIDE SERPL-SCNC: 102 MMOL/L (ref 94–109)
CO2 SERPL-SCNC: 22 MMOL/L (ref 20–32)
CREAT SERPL-MCNC: 0.53 MG/DL (ref 0.52–1.04)
DEPRECATED CALCIDIOL+CALCIFEROL SERPL-MC: 16 UG/L (ref 20–75)
FERRITIN SERPL-MCNC: 10 NG/ML (ref 12–150)
GFR SERPL CREATININE-BSD FRML MDRD: >90 ML/MIN/1.7M2
GLUCOSE SERPL-MCNC: 313 MG/DL (ref 70–99)
HBA1C MFR BLD: 8.6 % (ref 0–5.6)
HGB BLD-MCNC: 12.4 G/DL (ref 11.7–15.7)
LDLC SERPL DIRECT ASSAY-MCNC: 153 MG/DL
POTASSIUM SERPL-SCNC: 3.9 MMOL/L (ref 3.4–5.3)
SODIUM SERPL-SCNC: 136 MMOL/L (ref 133–144)

## 2018-04-24 PROCEDURE — 85018 HEMOGLOBIN: CPT | Performed by: FAMILY MEDICINE

## 2018-04-24 PROCEDURE — 90472 IMMUNIZATION ADMIN EACH ADD: CPT | Performed by: FAMILY MEDICINE

## 2018-04-24 PROCEDURE — 99396 PREV VISIT EST AGE 40-64: CPT | Mod: 25 | Performed by: FAMILY MEDICINE

## 2018-04-24 PROCEDURE — 80048 BASIC METABOLIC PNL TOTAL CA: CPT | Performed by: FAMILY MEDICINE

## 2018-04-24 PROCEDURE — G0145 SCR C/V CYTO,THINLAYER,RESCR: HCPCS | Performed by: FAMILY MEDICINE

## 2018-04-24 PROCEDURE — 82306 VITAMIN D 25 HYDROXY: CPT | Performed by: FAMILY MEDICINE

## 2018-04-24 PROCEDURE — 82728 ASSAY OF FERRITIN: CPT | Performed by: FAMILY MEDICINE

## 2018-04-24 PROCEDURE — 83036 HEMOGLOBIN GLYCOSYLATED A1C: CPT | Performed by: FAMILY MEDICINE

## 2018-04-24 PROCEDURE — 90746 HEPB VACCINE 3 DOSE ADULT IM: CPT | Performed by: FAMILY MEDICINE

## 2018-04-24 PROCEDURE — 36415 COLL VENOUS BLD VENIPUNCTURE: CPT | Performed by: FAMILY MEDICINE

## 2018-04-24 PROCEDURE — 87624 HPV HI-RISK TYP POOLED RSLT: CPT | Performed by: FAMILY MEDICINE

## 2018-04-24 PROCEDURE — 83721 ASSAY OF BLOOD LIPOPROTEIN: CPT | Performed by: FAMILY MEDICINE

## 2018-04-24 PROCEDURE — 90471 IMMUNIZATION ADMIN: CPT | Performed by: FAMILY MEDICINE

## 2018-04-24 PROCEDURE — 90732 PPSV23 VACC 2 YRS+ SUBQ/IM: CPT | Performed by: FAMILY MEDICINE

## 2018-04-24 RX ORDER — ATORVASTATIN CALCIUM 40 MG/1
40 TABLET, FILM COATED ORAL DAILY
Qty: 90 TABLET | Refills: 3 | Status: SHIPPED | OUTPATIENT
Start: 2018-04-24 | End: 2021-01-13

## 2018-04-24 RX ORDER — ERGOCALCIFEROL 1.25 MG/1
50000 CAPSULE, LIQUID FILLED ORAL
Qty: 8 CAPSULE | Refills: 0 | Status: SHIPPED | OUTPATIENT
Start: 2018-04-24 | End: 2018-06-13

## 2018-04-24 NOTE — PATIENT INSTRUCTIONS
Did you know you can lower your blood pressure with your daily habits?    *Losing 20 pounds of weight lowers blood pressure 5 to 20 points.  *Eating a low-fat diet rich in fruits, vegetables and low-fat dairy lowers blood pressure 8 to 14 points.  *Eating a low-salt diet lowers blood pressure 2 to 8 points.  *Exercising regularly lowers blood pressure 4 to 9 points.  *Reducing alcohol use lowers blood pressure 2 to 4 points.        [facility]    If you have any questions regarding to your visit please contact your care team:       Team Red:   Clinic Hours Telephone Number   Dr. Edilia Burks, NP   7am-7pm  Monday - Thursday   7am-5pm  Fridays  [phone]  (Appointment scheduling available 24/7)    Questions about your recent visit?   Team Line  [phone]   Urgent Care - [place] and [facility] - 11am-9pm Monday-Friday Saturday-Sunday- 9am-5pm   [place] - 5pm-9pm Monday-Friday Saturday-Sunday- 9am-5pm  [phone] - [place]  [phone] - [place]       What options do I have for a visit other than an office visit? We offer electronic visits (e-visits) and telephone visits, when medically appropriate.  Please check with your medical insurance to see if these types of visits are covered, as you will be responsible for any charges that are not paid by your insurance.      You can use [employer] (secure electronic communication) to access to your chart, send your primary care provider a message, or make an appointment. Ask a team member how to get started.     For a price quote for your services, please call our Consumer Price Line at [phone] or our Imaging Cost estimation line at [phone] (for imaging tests).      Preventive Health Recommendations  Female Ages 40 to 49    Yearly exam:     See your health care provider every year in order to  1. Review health changes.   2. Discuss preventive care.    3. Review your  medicines if your doctor prescribed any.      Get a Pap test every three years (unless you have an abnormal result and your provider advises testing more often).      If you get Pap tests with HPV test, you only need to test every 5 years, unless you have an abnormal result. You do not need a Pap test if your uterus was removed (hysterectomy) and you have not had cancer.      You should be tested each year for STDs (sexually transmitted diseases), if you're at risk.       Ask your doctor if you should have a mammogram.      Have a colonoscopy (test for colon cancer) if someone in your family has had colon cancer or polyps before age 50.       Have a cholesterol test every 5 years.       Have a diabetes test (fasting glucose) after age 45. If you are at risk for diabetes, you should have this test every 3 years.    Shots: Get a flu shot each year. Get a tetanus shot every 10 years.     Nutrition:     Eat at least 5 servings of fruits and vegetables each day.    Eat whole-grain bread, whole-wheat pasta and brown rice instead of white grains and rice.    Talk to your provider about Calcium and Vitamin D.     Lifestyle    Exercise at least 150 minutes a week (an average of 30 minutes a day, 5 days a week). This will help you control your weight and prevent disease.    Limit alcohol to one drink per day.    No smoking.     Wear sunscreen to prevent skin cancer.    See your dentist every six months for an exam and cleaning.

## 2018-04-24 NOTE — PROGRESS NOTES
Please call patient:  Your vitamin D is low and your cholesterol is high. Take Vitamin D 50,000 units weekly for 8 weeks (prescription has been sent to your pharmacy), then take over-the-counter vitamin D 2000 units daily indefinitely. You can lower your risk for heart disease by taking a daily cholesterol medication called atorvastatin 40 mg daily. Possible side effects include muscle aches and liver problems. I have sent a prescription to the pharmacy.     Your kidney test is normal. Your iron level is still low -- continue taking the iron tabs for 3 more months, then discontinue.     See the educator as planned for follow-up of diabetes and with me in 3 months.     Edilia Xie MD

## 2018-04-24 NOTE — PROGRESS NOTES
Your results are normal.  Your final test results are pending.  Please check your chart again within 3 to 5 days. You will receive further instruction when your full test result panel is complete.    Edilia Xie MD

## 2018-04-24 NOTE — NURSING NOTE
Screening Questionnaire for Adult Immunization    Are you sick today?   No   Do you have allergies to medications, food, a vaccine component or latex?   No   Have you ever had a serious reaction after receiving a vaccination?   No   Do you have a long-term health problem with heart disease, lung disease, asthma, kidney disease, metabolic disease (e.g. diabetes), anemia, or other blood disorder?   No   Do you have cancer, leukemia, HIV/AIDS, or any other immune system problem?   No   In the past 3 months, have you taken medications that affect  your immune system, such as prednisone, other steroids, or anticancer drugs; drugs for the treatment of rheumatoid arthritis, Crohn s disease, or psoriasis; or have you had radiation treatments?   No   Have you had a seizure, or a brain or other nervous system problem?   No   During the past year, have you received a transfusion of blood or blood     products, or been given immune (gamma) globulin or antiviral drug?   No   For women: Are you pregnant or is there a chance you could become        pregnant during the next month?   No   Have you received any vaccinations in the past 4 weeks?   No     Immunization questionnaire answers were all negative.        Per orders of Dr. Edilia iXe , injection of Hepatitis B and Pneumovax 23 given by Scot Montiel. Patient instructed to remain in clinic for 15 minutes afterwards, and to report any adverse reaction to me immediately.       Screening performed by Scot Montiel on 4/24/2018 at 10:43 AM.

## 2018-04-24 NOTE — PROGRESS NOTES
SUBJECTIVE:   CC: Jesus Alberto Denson is an 42 year old woman  who presents for preventive health visit.     Patient also presents to follow-up diabetes. Diabetic Review of Systems - Medication compliance:  compliant all of the time, Diabetic diet compliance:  compliant all of the time, Home glucose monitoring:  blood glucose record WAS NOT brought in today, Diabetic ROS: no polyuria or polydipsia, no chest pain, dyspnea or TIA's, no numbness, tingling or pain in extremities, no unusual visual symptoms, no hypoglycemia, no medication side effects noted, Last eye exam approximately 1 month ago.     Physical   Annual:     Getting at least 3 servings of Calcium per day::  Yes    Bi-annual eye exam::  Yes    Dental care twice a year::  Yes    Sleep apnea or symptoms of sleep apnea::  None    Diet::  Diabetic    Frequency of exercise::  2-3 days/week    Duration of exercise::  15-30 minutes    Taking medications regularly::  Yes    Medication side effects::  None    Additional concerns today::  No              Today's PHQ-2 Score:   PHQ-2 (  [employer]) 2018   Q1: Little interest or pleasure in doing things 0   Q2: Feeling down, depressed or hopeless 0   PHQ-2 Score 0   Q1: Little interest or pleasure in doing things Not at all   Q2: Feeling down, depressed or hopeless Not at all   PHQ-2 Score 0       Abuse: Current or Past(Physical, Sexual or Emotional)- No  Do you feel safe in your environment - Yes    Social History   Substance Use Topics     Smoking status: Never Smoker     Smokeless tobacco: Never Used      Comment: non-smoking household     Alcohol use No     Alcohol Use 2018   If you drink alcohol do you typically have greater than 3 drinks per day OR greater than 7 drinks per week? No       Reviewed orders with patient.  Reviewed health maintenance and updated orders accordingly - Yes  Patient Active Problem List   Diagnosis     Hyperlipidemia with target LDL less than 100     CKD (chronic kidney  disease) stage 1, GFR 90 ml/min or greater     Type 2 diabetes, uncontrolled, with renal manifestation (H)     Vitamin D deficiency     Anemia, iron deficiency     Calculus of kidney     Renal hypertension     Past Surgical History:   Procedure Laterality Date     CYSTOSCOPY,URETEROSCOPY,LITHOTRIPSY  03/01/2018    left stent placement      HC TOOTH EXTRACTION W/FORCEP         Social History   Substance Use Topics     Smoking status: Never Smoker     Smokeless tobacco: Never Used      Comment: non-smoking household     Alcohol use No     Family History   Problem Relation Age of Onset     DIABETES Mother      CANCER No family hx of      C.A.D. No family hx of      Hypertension No family hx of      CEREBROVASCULAR DISEASE No family hx of      Thyroid Disease No family hx of      Glaucoma No family hx of      Macular Degeneration No family hx of          Current Outpatient Prescriptions   Medication Sig Dispense Refill     Cholecalciferol (VITAMIN D) 2000 UNITS tablet Take 2,000 Units by mouth       ferrous sulfate (IRON) 325 (65 FE) MG tablet 1 tablet twice daily 180 tablet 1     insulin pen needle (BD ANAT U/F) 32G X 4 MM Use 1 daily as directed. 100 each 11     liraglutide (VICTOZA) 18 MG/3ML soln Inject 1.2 mg Subcutaneous daily 6 mL 3     metFORMIN (GLUCOPHAGE-XR) 500 MG 24 hr tablet Take 2 tablets (1,000 mg) by mouth 2 times daily (with meals) 360 tablet 1     order for DME Equipment being ordered: glucometer with lancets and strips to check sugars tid 1 each 1     Allergies   Allergen Reactions     Nkda [No Known Drug Allergies]        Patient under age 50, mutual decision reflected in health maintenance.      Pertinent mammograms are reviewed under the imaging tab.  History of abnormal Pap smear: NO - age 30-65 PAP every 5 years with negative HPV co-testing recommended    Reviewed and updated as needed this visit by clinical staff  Tobacco  Allergies  Meds  Problems  Med Hx  Surg Hx  Fam Hx  Soc Hx       "    Reviewed and updated as needed this visit by Provider  Tobacco  Allergies  Meds  Problems  Med Hx  Surg Hx        Past Medical History:   Diagnosis Date     CKD (chronic kidney disease) stage 1, GFR 90 ml/min or greater      DM mellitus, gestational      Hyperlipidemia      Obesity 8/13/2012     Renal hypertension 4/24/2018     Renal stone      Type 2 diabetes, HbA1c goal < 7% (H) 3/11/2013        Review of Systems  CONSTITUTIONAL: NEGATIVE for fever, chills, change in weight  INTEGUMENTARU/SKIN: NEGATIVE for worrisome rashes, moles or lesions  EYES: NEGATIVE for vision changes or irritation  ENT: NEGATIVE for ear, mouth and throat problems  RESP: NEGATIVE for significant cough or SOB  BREAST: NEGATIVE for masses, tenderness or discharge  CV: NEGATIVE for chest pain, palpitations or peripheral edema  GI: NEGATIVE for nausea, abdominal pain, heartburn, or change in bowel habits  : NEGATIVE for unusual urinary or vaginal symptoms. Periods are regular.  MUSCULOSKELETAL: NEGATIVE for significant arthralgias or myalgia  NEURO: NEGATIVE for weakness, dizziness or paresthesias  ENDOCRINE: Hx diabetes  HEME/ALLERGY/IMMUNE: NEGATIVE for bleeding problems  PSYCHIATRIC: NEGATIVE for changes in mood or affect     OBJECTIVE:   /84  Pulse 83  Temp 97  F (36.1  C) (Oral)  Resp 20  Ht 5' 0.5\" (1.537 m)  Wt 154 lb (69.9 kg)  LMP 04/16/2018  SpO2 99%  Breastfeeding? No  BMI 29.58 kg/m2  Physical Exam  GENERAL: alert, no distress and over weight  EYES: Eyes grossly normal to inspection, PERRL and conjunctivae and sclerae normal  HENT: ear canals and TM's normal, nose and mouth without ulcers or lesions  NECK: no adenopathy, no asymmetry, masses, or scars and thyroid normal to palpation  RESP: lungs clear to auscultation - no rales, rhonchi or wheezes  BREAST: normal without masses, tenderness or nipple discharge and no palpable axillary masses or adenopathy  CV: regular rate and rhythm, normal S1 S2, no S3 " or S4, no murmur, click or rub, no peripheral edema and peripheral pulses strong  ABDOMEN: soft, nontender, no hepatosplenomegaly, no masses and bowel sounds normal   (female): normal female external genitalia, normal urethral meatus, vaginal mucosa pink, moist, well rugated, and normal cervix/adnexa/uterus without masses or discharge  MS: no gross musculoskeletal defects noted, no edema  SKIN: no suspicious lesions or rashes  NEURO: Normal strength and tone, mentation intact and speech normal  PSYCH: mentation appears normal, affect normal/bright    ASSESSMENT/PLAN:   (Z00.00) Routine history and physical examination of adult  (primary encounter diagnosis)  Plan: Pap imaged thin layer screen with HPV -         recommended age 30 - 65 years (select HPV order        below), HPV High Risk Types DNA Cervical,         HEPATITIS B VACCINE,ADULT,IM, *MA Screening         Digital Bilateral          (E11.22,  N18.1,  E11.65,  Z79.4) Uncontrolled type 2 diabetes mellitus with stage 1 chronic kidney disease, with long-term current use of insulin (H)  (N18.1) CKD (chronic kidney disease) stage 1, GFR 90 ml/min or greater  Plan: Basic metabolic panel, LDL cholesterol direct        See educator as scheduled. Follow-up with me in 1 month with fasting labs.     (I12.9) Renal hypertension  Comment: uncontrolled   Plan: Basic metabolic panel, LDL cholesterol direct        Consider addition of lisinopril 5 mg daily. Counseled to make better food choices, exercise as tolerated, and lose weight. Follow-up 1 month.     (D50.9) Iron deficiency anemia, unspecified iron deficiency anemia type  Plan: Ferritin, Hemoglobin        (E78.5) Hyperlipidemia with target LDL less than 100  Plan: LDL cholesterol direct         (E55.9) Vitamin D deficiency  Plan: Vitamin D Deficiency          COUNSELING:  Reviewed preventive health counseling, as reflected in patient instructions  Special attention given to:        Regular exercise       Healthy  "diet/nutrition       Vision screening       Immunizations    Vaccinated for: Hepatitis B and Pneumococcal             Contraception       Osteoporosis Prevention/Bone Health       The 10-year ASCVD risk score (Polandchristie DELAROSA Jr, et al., 2013) is: 3.2%    Values used to calculate the score:      Age: 42 years      Sex: Female      Is Non- : No      Diabetic: Yes      Tobacco smoker: No      Systolic Blood Pressure: 144 mmHg      Is BP treated: No      HDL Cholesterol: 35 mg/dL      Total Cholesterol: 193 mg/dL         reports that she has never smoked. She has never used smokeless tobacco.    Estimated body mass index is 29.58 kg/(m^2) as calculated from the following:    Height as of this encounter: 5' 0.5\" (1.537 m).    Weight as of this encounter: 154 lb (69.9 kg).   Weight management plan: Discussed healthy diet and exercise guidelines and patient will follow up in 1 month in clinic to re-evaluate.    Counseling Resources:  ATP IV Guidelines  Pooled Cohorts Equation Calculator  Breast Cancer Risk Calculator  FRAX Risk Assessment  ICSI Preventive Guidelines  Dietary Guidelines for Americans, 2010  USDA's MyPlate  ASA Prophylaxis  Lung CA Screening    Edilia Xie MD  [facility]  "

## 2018-04-24 NOTE — NURSING NOTE
"Chief Complaint   Patient presents with     Physical     Health Maintenance     PAP, HPV        Initial /84  Pulse 83  Temp 97  F (36.1  C) (Oral)  Resp 20  Ht 5' 0.5\" (1.537 m)  Wt 154 lb (69.9 kg)  LMP 04/16/2018  SpO2 99%  Breastfeeding? No  BMI 29.58 kg/m2 Estimated body mass index is 29.58 kg/(m^2) as calculated from the following:    Height as of this encounter: 5' 0.5\" (1.537 m).    Weight as of this encounter: 154 lb (69.9 kg).  Medication Reconciliation: complete   Faby MARTINEZ MA      "

## 2018-04-24 NOTE — MR AVS SNAPSHOT
After Visit Summary   4/24/2018    Jesus Alberto Denson    MRN: [id number]           Patient Information     Date Of Birth          1975        Visit Information        Provider Department      4/24/2018 9:20 AM Edilia Xie MD [facility]        Today's Diagnoses     Routine history and physical examination of adult    -  1    Uncontrolled type 2 diabetes mellitus with stage 1 chronic kidney disease, with long-term current use of insulin (H)        CKD (chronic kidney disease) stage 1, GFR 90 ml/min or greater        Renal hypertension        Iron deficiency anemia, unspecified iron deficiency anemia type        Hyperlipidemia with target LDL less than 100        Vitamin D deficiency        Need for vaccination        Visit for screening mammogram          Care Instructions    Did you know you can lower your blood pressure with your daily habits?    *Losing 20 pounds of weight lowers blood pressure 5 to 20 points.  *Eating a low-fat diet rich in fruits, vegetables and low-fat dairy lowers blood pressure 8 to 14 points.  *Eating a low-salt diet lowers blood pressure 2 to 8 points.  *Exercising regularly lowers blood pressure 4 to 9 points.  *Reducing alcohol use lowers blood pressure 2 to 4 points.        [facility]    If you have any questions regarding to your visit please contact your care team:       Team Red:   Clinic Hours Telephone Number   Dr. Edilia Burks, NP   7am-7pm  Monday - Thursday   7am-5pm  Fridays  [phone]  (Appointment scheduling available 24/7)    Questions about your recent visit?   Team Line  [phone]   Urgent Care - [place] - 11am-9pm Monday-Friday Saturday-Sunday- 9am-5pm   [place] - 5pm-9pm Monday-Friday Saturday-Sunday- 9am-5pm  [phone] - Chelsie Ventura  [phone] - [place]       What options do I have for a visit other than an office visit? We  offer electronic visits (e-visits) and telephone visits, when medically appropriate.  Please check with your medical insurance to see if these types of visits are covered, as you will be responsible for any charges that are not paid by your insurance.      You can use Neonode (secure electronic communication) to access to your chart, send your primary care provider a message, or make an appointment. Ask a team member how to get started.     For a price quote for your services, please call our Consumer Price Line at [phone] or our Imaging Cost estimation line at [phone] (for imaging tests).      Preventive Health Recommendations  Female Ages 40 to 49    Yearly exam:     See your health care provider every year in order to  1. Review health changes.   2. Discuss preventive care.    3. Review your medicines if your doctor prescribed any.      Get a Pap test every three years (unless you have an abnormal result and your provider advises testing more often).      If you get Pap tests with HPV test, you only need to test every 5 years, unless you have an abnormal result. You do not need a Pap test if your uterus was removed (hysterectomy) and you have not had cancer.      You should be tested each year for STDs (sexually transmitted diseases), if you're at risk.       Ask your doctor if you should have a mammogram.      Have a colonoscopy (test for colon cancer) if someone in your family has had colon cancer or polyps before age 50.       Have a cholesterol test every 5 years.       Have a diabetes test (fasting glucose) after age 45. If you are at risk for diabetes, you should have this test every 3 years.    Shots: Get a flu shot each year. Get a tetanus shot every 10 years.     Nutrition:     Eat at least 5 servings of fruits and vegetables each day.    Eat whole-grain bread, whole-wheat pasta and brown rice instead of white grains and rice.    Talk to your provider about Calcium and Vitamin D.      Lifestyle    Exercise at least 150 minutes a week (an average of 30 minutes a day, 5 days a week). This will help you control your weight and prevent disease.    Limit alcohol to one drink per day.    No smoking.     Wear sunscreen to prevent skin cancer.    See your dentist every six months for an exam and cleaning.          Follow-ups after your visit        Follow-up notes from your care team     Return in about 1 month (around 5/24/2018) for diabetes (fasting labs up to one week prior), BP Recheck.      Your next 10 appointments already scheduled     Apr 30, 2018  9:30 AM CDT   US RENAL COMPLETE with FKUS1   [facility] ([facility])    [address] [place] [address]   [phone]           Please bring a list of your medicines (including vitamins, minerals and over-the-counter drugs). Also, tell your doctor about any allergies you may have. Wear comfortable clothes and leave your valuables at home.  You do not need to do anything special to prepare for your exam.  Please call the Imaging Department at your exam site with any questions.            May 07, 2018  9:30 AM CDT   Return Visit with Jimy Anguiano MD   [facility] ([facility])    [address] [place] [id number]   [phone]            May 14, 2018  9:30 AM CDT   Diabetic Education with AN DIABETIC ED RESOURCE   [facility] [place] ([facility])    [address] [facility] [address]   [phone]            May 16, 2018  9:20 AM CDT   SHORT with Mary Flores MD   [facility] ([facility])    [address] [facility] [address]   [phone]              Future tests that were ordered for you today     Open Future Orders        Priority Expected Expires Ordered    *MA Screening Digital Bilateral Routine  4/24/2019 4/24/2018            Who to contact     If you have questions or need  "follow up information about today's clinic visit or your schedule please contact [facility] directly at [phone].  Normal or non-critical lab and imaging results will be communicated to you by RingCentralhart, letter or phone within 4 business days after the clinic has received the results. If you do not hear from us within 7 days, please contact the clinic through RingCentralhart or phone. If you have a critical or abnormal lab result, we will notify you by phone as soon as possible.  Submit refill requests through Citymapper [employer] or call your pharmacy and they will forward the refill request to us. Please allow 3 business days for your refill to be completed.          Additional Information About Your Visit        RingCentralharEarth Sky Information     [employer] gives you secure access to your electronic health record. If you see a primary care provider, you can also send messages to your care team and make appointments. If you have questions, please call your primary care clinic.  If you do not have a primary care provider, please call [phone] and they will assist you.        Care EveryWhere ID     This is your Care EveryWhere ID. This could be used by other organizations to access your [place] medical records  [id number]        Your Vitals Were     Pulse Temperature Respirations Height Last Period Pulse Oximetry    83 97  F (36.1  C) (Oral) 20 5' 0.5\" (1.537 m) 04/16/2018 99%    Breastfeeding? BMI (Body Mass Index)                No 29.58 kg/m2           Blood Pressure from Last 3 Encounters:   04/24/18 144/84   02/26/18 126/80   02/20/18 (!) 156/92    Weight from Last 3 Encounters:   04/24/18 154 lb (69.9 kg)   04/02/18 145 lb (65.8 kg)   02/26/18 148 lb (67.1 kg)              We Performed the Following     Basic metabolic panel     Ferritin     Hemoglobin A1c     Hemoglobin     HEPATITIS B VACCINE,ADULT,IM     HPV High Risk Types DNA Cervical     LDL cholesterol direct     Pap imaged thin layer screen with HPV - recommended " age 30 - 65 years (select HPV order below)     Pneumococcal vaccine 23 valent PPSV23  (Pneumovax) [31927]     Vitamin D Deficiency        Primary Care Provider Office Phone # Fax #    Edilia Xie [phone] [address] [facility] [address]        Equal Access to Services     [facility] : Hadii aad ku hadasho Soomaali, waaxda luqadaha, qaybta kaalmada adeegyada, waxay milindin hayaan adebradford kharahemalatha laManavaan . So [facility] [phone].    ATENCIÓN: Si habla español, tiene a mcdowell disposición servicios gratuitos de asistencia lingüística. Llame al [phone].    We comply with applicable federal civil rights laws and [place] laws. We do not discriminate on the basis of race, color, national origin, age, disability, sex, sexual orientation, or gender identity.            Thank you!     Thank you for choosing [facility]  for your care. Our goal is always to provide you with excellent care. Hearing back from our patients is one way we can continue to improve our services. Please take a few minutes to complete the written survey that you may receive in the mail after your visit with us. Thank you!             Your Updated Medication List - Protect others around you: Learn how to safely use, store and throw away your medicines at www.disposemymeds.org.          This list is accurate as of 4/24/18 10:18 AM.  Always use your most recent med list.                   Brand Name Dispense Instructions for use Diagnosis    ferrous sulfate 325 (65 Fe) MG tablet    IRON    180 tablet    1 tablet twice daily    Type 2 diabetes, HbA1C goal < 8% (H)       insulin pen needle 32G X 4 MM    BD ANAT U/F    100 each    Use 1 daily as directed.    Type 2 diabetes, uncontrolled, with renal manifestation (H)       liraglutide 18 MG/3ML soln    VICTOZA    6 mL    Inject 1.2 mg Subcutaneous daily    Type 2 diabetes, uncontrolled, with renal manifestation (H)       metFORMIN 500 MG 24 hr tablet     GLUCOPHAGE-XR    360 tablet    Take 2 tablets (1,000 mg) by mouth 2 times daily (with meals)    Type 2 diabetes, uncontrolled, with renal manifestation (H)       order for DME     1 each    Equipment being ordered: glucometer with lancets and strips to check sugars tid    Uncontrolled type 2 diabetes mellitus with chronic kidney disease, with long-term current use of insulin, unspecified CKD stage (H)       vitamin D 2000 units tablet      Take 2,000 Units by mouth

## 2018-04-25 ENCOUNTER — TELEPHONE (OUTPATIENT)
Dept: FAMILY MEDICINE | Facility: CLINIC | Age: 43
End: 2018-04-25

## 2018-04-26 LAB
COPATH REPORT: NORMAL
PAP: NORMAL

## 2018-04-26 NOTE — TELEPHONE ENCOUNTER
Patient notified of Provider's message as written.  Patient verbalized understanding.  Leila Arenas RN

## 2018-04-30 ENCOUNTER — RADIANT APPOINTMENT (OUTPATIENT)
Dept: ULTRASOUND IMAGING | Facility: CLINIC | Age: 43
End: 2018-04-30
Attending: UROLOGY
Payer: COMMERCIAL

## 2018-04-30 DIAGNOSIS — N20.1 CALCULUS OF URETER: ICD-10-CM

## 2018-04-30 LAB
FINAL DIAGNOSIS: NORMAL
HPV HR 12 DNA CVX QL NAA+PROBE: NEGATIVE
HPV16 DNA SPEC QL NAA+PROBE: NEGATIVE
HPV18 DNA SPEC QL NAA+PROBE: NEGATIVE
SPECIMEN DESCRIPTION: NORMAL
SPECIMEN SOURCE CVX/VAG CYTO: NORMAL

## 2018-04-30 PROCEDURE — 76770 US EXAM ABDO BACK WALL COMP: CPT

## 2018-05-03 ENCOUNTER — RADIANT APPOINTMENT (OUTPATIENT)
Dept: MAMMOGRAPHY | Facility: CLINIC | Age: 43
End: 2018-05-03
Attending: FAMILY MEDICINE
Payer: COMMERCIAL

## 2018-05-03 DIAGNOSIS — Z12.31 VISIT FOR SCREENING MAMMOGRAM: ICD-10-CM

## 2018-05-03 PROCEDURE — 77067 SCR MAMMO BI INCL CAD: CPT | Mod: TC

## 2018-05-07 ENCOUNTER — OFFICE VISIT (OUTPATIENT)
Dept: UROLOGY | Facility: CLINIC | Age: 43
End: 2018-05-07
Payer: COMMERCIAL

## 2018-05-07 VITALS — HEART RATE: 77 BPM | SYSTOLIC BLOOD PRESSURE: 110 MMHG | RESPIRATION RATE: 12 BRPM | DIASTOLIC BLOOD PRESSURE: 68 MMHG

## 2018-05-07 DIAGNOSIS — R93.89 ENDOMETRIAL THICKENING ON ULTRA SOUND: Primary | ICD-10-CM

## 2018-05-07 DIAGNOSIS — N20.1 CALCULUS OF URETER: ICD-10-CM

## 2018-05-07 PROCEDURE — 99213 OFFICE O/P EST LOW 20 MIN: CPT | Performed by: UROLOGY

## 2018-05-07 NOTE — PROGRESS NOTES
Chief Complaint   Patient presents with     RECHECK       Jesus Alberto Denson is a 42 year old female who presents today for follow up of   Chief Complaint   Patient presents with     RECHECK    f/u after left ureteroscopy and stone removal.  She is without any complaints.      Current Outpatient Prescriptions   Medication Sig Dispense Refill     atorvastatin (LIPITOR) 40 MG tablet Take 1 tablet (40 mg) by mouth daily 90 tablet 3     Cholecalciferol (VITAMIN D) 2000 UNITS tablet Take 2,000 Units by mouth       ferrous sulfate (IRON) 325 (65 FE) MG tablet 1 tablet twice daily 180 tablet 1     insulin pen needle (BD ANAT U/F) 32G X 4 MM Use 1 daily as directed. 100 each 11     liraglutide (VICTOZA) 18 MG/3ML soln Inject 1.2 mg Subcutaneous daily 6 mL 3     metFORMIN (GLUCOPHAGE-XR) 500 MG 24 hr tablet Take 2 tablets (1,000 mg) by mouth 2 times daily (with meals) 360 tablet 1     order for DME Equipment being ordered: glucometer with lancets and strips to check sugars tid 1 each 1     vitamin D (ERGOCALCIFEROL) 40574 UNIT capsule Take 1 capsule (50,000 Units) by mouth every 7 days for 8 doses . Then, take over-the-counter 2000 units daily indefinitely (no refills) 8 capsule 0     Allergies   Allergen Reactions     Nkda [No Known Drug Allergies]       Past Medical History:   Diagnosis Date     CKD (chronic kidney disease) stage 1, GFR 90 ml/min or greater      DM mellitus, gestational      Hyperlipidemia      Obesity 8/13/2012     Renal hypertension 4/24/2018     Renal stone      Type 2 diabetes, HbA1c goal < 7% (H) 3/11/2013     Vitamin D deficiency      Past Surgical History:   Procedure Laterality Date     CYSTOSCOPY,URETEROSCOPY,LITHOTRIPSY  03/01/2018    left stent placement      HC TOOTH EXTRACTION W/FORCEP       Family History   Problem Relation Age of Onset     DIABETES Mother      CANCER No family hx of      C.A.D. No family hx of      Hypertension No family hx of      CEREBROVASCULAR DISEASE No family hx of       Thyroid Disease No family hx of      Glaucoma No family hx of      Macular Degeneration No family hx of      Social History     Social History     Marital status:      Spouse name: Ge     Number of children: 8     Years of education: 12     Occupational History      Homemaker     Social History Main Topics     Smoking status: Never Smoker     Smokeless tobacco: Never Used      Comment: non-smoking household     Alcohol use No     Drug use: No     Sexual activity: Yes     Partners: Male     Birth control/ protection: Condom     Other Topics Concern     Parent/Sibling W/ Cabg, Mi Or Angioplasty Before 65f 55m? No     Social History Narrative       REVIEW OF SYSTEMS  =================  C: NEGATIVE for fever, chills, change in weight  I: NEGATIVE for worrisome rashes, moles or lesions  E/M: NEGATIVE for ear, mouth and throat problems  R: NEGATIVE for significant cough or SHORTNESS OF BREATH,   CV: NEGATIVE for chest pain, palpitations or peripheral edema  GI: NEGATIVE for nausea, abdominal pain, heartburn, or change in bowel habits  NEURO: NEGATIVE any motor/sensory changes  PSYCH: NEGATIVE for recent mood disorder    Physical Exam:  /68 (BP Location: Right arm, Patient Position: Chair, Cuff Size: Adult Regular)  Pulse 77  Resp 12  LMP 04/16/2018   Patient is pleasant, in no acute distress, good general condition.  Lung: no evidence of respiratory distress    Abdomen: Soft, nondistended, non tender. No masses. No rebound or guarding.   Exam: no cva tenderness  Skin: Warm and dry.  No redness.  Psych: normal mood and affect  Neuro: alert and oriented    ULTRASOUND RETROPERITONEAL COMPLETE 4/30/2018 9:49 AM      HISTORY:  Calculus of ureter.     COMPARISON: None.     FINDINGS: The bilateral renal parenchyma are unremarkable in  echogenicity without evidence for shadowing stone or mass. 1.2 cm cyst  in the lower pole of left kidney. No hydronephrosis. Right kidney  measures 13.9 x 5.6 x 7.0 cm and the  left measures 7.2 x 4.1 x 3.0 cm.  Cortical thickness is 2.0 cm on the right and 1.1 cm on the left.   Bladder is unremarkable given its level of distention. Incidental note  is made of a thickened endometrium at 1.9 cm.         IMPRESSION:   1. Left renal atrophy with compensatory hypertrophy of the right  kidney. No obstruction demonstrated.  2. Incidental note is made of a thickened endometrium at 1.9 cm.  Consider endometrial biopsy for further evaluation.     LINO JARAMILLO MD  Assessment/Plan:   (R93.8) Endometrial thickening on ultra sound  (primary encounter diagnosis)  Comment:    Plan: OB/GYN REFERRAL             (N20.1) Calculus of ureter  Comment:    Plan: no obstruction but some renal atrophy from chronic obstruction.

## 2018-05-07 NOTE — MR AVS SNAPSHOT
After Visit Summary   5/7/2018    Jesus Alberto Denson    MRN: [id number]           Patient Information     Date Of Birth          1975        Visit Information        Provider Department      5/7/2018 9:30 AM Jimy Anguiano MD [facility]        Today's Diagnoses     Endometrial thickening on ultra sound    -  1       Follow-ups after your visit        Additional Services     OB/GYN REFERRAL       Your provider has referred you to:  G: [facility] [phone]   [email or web address]    Please be aware that coverage of these services is subject to the terms and limitations of your health insurance plan.  Call member services at your health plan with any benefit or coverage questions.      Please bring the following with you to your appointment:    (1) Any X-Rays, CTs or MRIs which have been performed.  Contact the facility where they were done to arrange for  prior to your scheduled appointment.   (2) List of current medications   (3) This referral request   (4) Any documents/labs given to you for this referral                  Your next 10 appointments already scheduled     May 14, 2018  9:30 AM CDT   Diabetic Education with AN DIABETIC ED RESOURCE   [facility] [place] ([facility])    [address] [facility] [address] [phone]            May 16, 2018  9:20 AM CDT   SHORT with Mary Flores MD   [facility] ([facility])    [address] [facility] [address] [phone]              Who to contact     If you have questions or need follow up information about today's clinic visit or your schedule please contact [facility] directly at [phone].  Normal or non-critical lab and imaging results will be communicated to you by MyChart, letter or phone within 4 business days after the clinic has received the results. If you do not hear from  us within 7 days, please contact the clinic through CheapFlightsFinder or phone. If you have a critical or abnormal lab result, we will notify you by phone as soon as possible.  Submit refill requests through CheapFlightsFinder or call your pharmacy and they will forward the refill request to us. Please allow 3 business days for your refill to be completed.          Additional Information About Your Visit        eTapestryharYardbarker Network Information     CheapFlightsFinder gives you secure access to your electronic health record. If you see a primary care provider, you can also send messages to your care team and make appointments. If you have questions, please call your primary care clinic.  If you do not have a primary care provider, please call [phone] and they will assist you.        Care EveryWhere ID     This is your Care EveryWhere ID. This could be used by other organizations to access your [place] medical records  [id number]        Your Vitals Were     Pulse Respirations Last Period             77 12 04/16/2018          Blood Pressure from Last 3 Encounters:   05/07/18 110/68   04/24/18 144/84   02/26/18 126/80    Weight from Last 3 Encounters:   04/24/18 69.9 kg (154 lb)   04/02/18 65.8 kg (145 lb)   02/26/18 67.1 kg (148 lb)              We Performed the Following     OB/GYN REFERRAL        Primary Care Provider Office Phone # Fax #    Edilia Xie [phone] [address] [facility] [address]        Equal Access to Services     [facility] AH: Hadii aad ku hadasho Soomaali, waaxda luqadaha, qaybta kaalmada adeegyada, cuong villar . So [facility] [phone].    ATENCIÓN: Si habla español, tiene a mcdowell disposición servicios gratuitos de asistencia lingüística. Llame al [phone].    We comply with applicable federal civil rights laws and [place] laws. We do not discriminate on the basis of race, color, national origin, age, disability, sex, sexual orientation, or gender identity.             Thank you!     Thank you for choosing [facility]  for your care. Our goal is always to provide you with excellent care. Hearing back from our patients is one way we can continue to improve our services. Please take a few minutes to complete the written survey that you may receive in the mail after your visit with us. Thank you!             Your Updated Medication List - Protect others around you: Learn how to safely use, store and throw away your medicines at www.disposemymeds.org.          This list is accurate as of 5/7/18 10:07 AM.  Always use your most recent med list.                   Brand Name Dispense Instructions for use Diagnosis    atorvastatin 40 MG tablet    LIPITOR    90 tablet    Take 1 tablet (40 mg) by mouth daily    Hyperlipidemia with target LDL less than 100       ferrous sulfate 325 (65 Fe) MG tablet    IRON    180 tablet    1 tablet twice daily    Type 2 diabetes, HbA1C goal < 8% (H)       insulin pen needle 32G X 4 MM    BD ANAT U/F    100 each    Use 1 daily as directed.    Type 2 diabetes, uncontrolled, with renal manifestation (H)       liraglutide 18 MG/3ML soln    VICTOZA    6 mL    Inject 1.2 mg Subcutaneous daily    Type 2 diabetes, uncontrolled, with renal manifestation (H)       metFORMIN 500 MG 24 hr tablet    GLUCOPHAGE-XR    360 tablet    Take 2 tablets (1,000 mg) by mouth 2 times daily (with meals)    Type 2 diabetes, uncontrolled, with renal manifestation (H)       order for DME     1 each    Equipment being ordered: glucometer with lancets and strips to check sugars tid    Uncontrolled type 2 diabetes mellitus with chronic kidney disease, with long-term current use of insulin, unspecified CKD stage (H)       vitamin D 2000 units tablet      Take 2,000 Units by mouth        vitamin D 27251 UNIT capsule    ERGOCALCIFEROL    8 capsule    Take 1 capsule (50,000 Units) by mouth every 7 days for 8 doses . Then, take over-the-counter 2000 units daily indefinitely (no  refills)    Vitamin D deficiency

## 2018-05-14 ENCOUNTER — ALLIED HEALTH/NURSE VISIT (OUTPATIENT)
Dept: EDUCATION SERVICES | Facility: CLINIC | Age: 43
End: 2018-05-14
Payer: COMMERCIAL

## 2018-05-14 DIAGNOSIS — E11.9 DIABETES MELLITUS WITHOUT COMPLICATION (H): Primary | ICD-10-CM

## 2018-05-14 PROCEDURE — G0108 DIAB MANAGE TRN  PER INDIV: HCPCS

## 2018-05-14 PROCEDURE — 99207 ZZC DROP WITH A PROCEDURE: CPT

## 2018-05-14 NOTE — PATIENT INSTRUCTIONS
My Diabetes Care Goals:    Healthy Eating: I will not skip a meal.      Being Active: I will continue to walk to the park with the kids    Monitoring: I will check your BG when you wake up and another time during the day    Taking Medication: I will continue taking Metformin  mg taking 2 pills with breakfast and 2 pills with dinner  Victoza take 1.2 mg daily  When using up the Victoza, start Trulicity the next day , and take weekly.  When you take the 4th pen of Trulicity, wait 1 week and then restart Victoza at 1.2 mg daily.  Order more Victoza when you use the last Trulicity pen.     Next A1C is due Aug. 2018.      Follow up:  Follow-up diabetes education appointment scheduled Nov. 5 @ 9:30    Follow up with Dr. Flores as she recommends for diabetes.     Bring blood glucose meter and logbook with you to all doctor and follow-up appointments.     [place] [facility] for the [facility] Area:  For Your Diabetes Education and Nutrition Appointments Call:  [phone]   For Diabetes Education or Nutrition Related Questions:   Phone: [phone]  E-mail: [email or web address]  Fax: [phone]   If you need a medication refill please contact your pharmacy. Please allow 3 business days for your refills to be completed.    Instructions for emailing the Diabetes Educators    If you need to communicate a non-urgent message to a Diabetes Educator via email, please send to [email or web address].    Please follow the following email guidelines:    Subject line: Secure: your clinic name (example: Secure: [facility])  In the email please include: First name, middle initial, last name and date of birth.    We will be in touch with you within one (1) business day.

## 2018-05-14 NOTE — PROGRESS NOTES
Diabetes Self Management Training: Follow-up Visit    Jesus Alberto Denson presents today for education, evaluation of glucose control and modification of medication(s) related to Type 2 diabetes.    She is accompanied by self    Patient's diabetes management related comments/concerns: felt a little dizzy after taking Victoza.      Patient would like this visit to be focused around the following diabetes-related behaviors and goals: Healthy Eating, Being Active, Monitoring and Taking Medication    ASSESSMENT:  Patient Problem List reviewed for relevant medical history and current medical status.    Current Diabetes Management per Patient:  Taking diabetes medications?   yes:     Diabetes Medication(s)     Biguanides Sig    metFORMIN (GLUCOPHAGE-XR) 500 MG 24 hr tablet Take 2 tablets (1,000 mg) by mouth 2 times daily (with meals)    Incretin Mimetic Agents (GLP-1 Receptor Agonists) Sig    liraglutide (VICTOZA) 18 MG/3ML soln Inject 1.2 mg Subcutaneous daily      , Oral Medications: Metformin - Dose:  mg takes 1000 mg , Time: breakfast and supper, Injectable Medications: Victoza 1.2mg once daily    *Abbreviated insulin dose documentation key: Insulin Trade Name (mhjtigynk-dausy-dcgfxk-bedtime) - i.e. Humalog 5-5-5-0 (Humalog 5 units at breakfast, 5 units at lunch, and 5 units at dinner).    Patient glucose self monitoring as follows: two times daily.   BG meter: Accu-chek Yolanda meter  BG results:not available, did not bring in her meter, 110-120 in the am and after meals 130.       BG values are: unable to assess  Patient's most recent   Lab Results   Component Value Date    A1C 8.6 04/24/2018    is not meeting goal of <7.0    Nutrition:  Patient eats 3 meals per day    Breakfast - eggs and bread  Snack: fruit or nuts  Lunch - sometimes may skip, or may have stir goetz or PB and Jelly   Dinner - chicken wings or beef, rice and veggies. Some grapes. Some days a salad   Snacks - fruit or a bowl of cereal.      Beverages:  "water, tea, juice some days.     Cultural/Religion diet restrictions: Yes     Biggest Challenge to Healthy Eating: none and cooks for large family.     Physical Activity:    Type: walking to the park, with her kids.     Diabetes Complications:  Chronic Complication Prevention: Eyes: exam within in the last year? Yes  Nerve/Circulation: foot exam within the last year Yes  Dental Health: brushing/flossing regularly Yes, dental exam within last year Yes    Vitals:  LMP 04/16/2018  Estimated body mass index is 29.58 kg/(m^2) as calculated from the following:    Height as of 4/24/18: 1.537 m (5' 0.5\").    Weight as of 4/24/18: 69.9 kg (154 lb).   Last 3 BP:   BP Readings from Last 3 Encounters:   05/07/18 110/68   04/24/18 144/84   02/26/18 126/80       History   Smoking Status     Never Smoker   Smokeless Tobacco     Never Used     Comment: non-smoking household       Labs:  Lab Results   Component Value Date    A1C 8.6 04/24/2018     Lab Results   Component Value Date     04/24/2018     Lab Results   Component Value Date     04/24/2018    LDL 84 02/07/2018     HDL Cholesterol   Date Value Ref Range Status   02/07/2018 35 (L) >49 mg/dL Final   ]  GFR Estimate   Date Value Ref Range Status   04/24/2018 >90 >60 mL/min/1.7m2 Final     Comment:     Non  GFR Calc     GFR Estimate If Black   Date Value Ref Range Status   04/24/2018 >90 >60 mL/min/1.7m2 Final     Comment:      GFR Calc     Lab Results   Component Value Date    CR 0.53 04/24/2018     No results found for: MICROALBUMIN    Health Beliefs and Attitudes:   Patient Activation Measure Survey Score:  No flowsheet data found.    Stage of Change: ACTION (Actively working towards change)    Progress toward meeting diabetes-related behavioral goals:    GOALS % Met Goal   Healthy Eating 75   Physical Activity  75   Monitoring  75   Medication Taking  100   Problem Solving     Healthy Coping     Risk Reduction   "         Diabetes knowledge and skills assessment:     Patient is knowledgeable in diabetes management concepts related to: Healthy Eating, Being Active, Monitoring and Taking Medication    Patient needs further education on the following diabetes management concepts: Healthy Eating, Being Active, Monitoring and Taking Medication    Barriers to Learning Assessment: No Barriers identified    Based on learning assessment above, most appropriate setting for further diabetes education would be: Individual setting.    INTERVENTION:    Education provided today on:  AADE Self-Care Behaviors:  Healthy Eating: carbohydrate counting, consistency in amount, composition, and timing of food intake, weight reduction, heart healthy diet, eating out, portion control, plate planning method and label reading  Being Active: relationship to blood glucose and describe appropriate activity program  Monitoring: purpose, proper technique, log and interpret results, individual blood glucose targets and frequency of monitoring  Taking Medication: action of prescribed medication, drawing up, administering and storing injectable diabetes medications, proper site selection and rotation for injections, side effects of prescribed medications and when to take medications  She was given Victoza and Trulicity from her pharmacy and charged with both. I did give her instructions on how to use the victoza and then switch to Trulicity to use that up and then how to restart Victoza again once she has finished her last Trulicity.  Did call pharmacists to discontinue the Trulicity .    Opportunities for ongoing education and support in diabetes-self management were discussed.    Pt verbalized understanding of concepts discussed and recommendations provided today.       Education Materials Provided:  [place] Understanding Diabetes Booklet, Safe Disposal Options for Needles & Syringes, BG Log Sheet and No new materials provided today    PLAN:  See Patient  Instructions for co-developed, patient-stated behavior change goals.  AVS printed and provided to patient today.    FOLLOW-UP:  Follow-up appointment scheduled on Nov. 5 .  Follow-up with PCP recommended.  Chart routed to referring provider.    Ongoing plan for education and support: Follow-up visit with diabetes educator in [place]    Leah Perdomo RN/GRAD[facility] Diabetes Educator    Time Spent: 30 minutes  Encounter Type: Individual    Any diabetes medication dose changes were made via the CDE Protocol and Collaborative Practice Agreement with the patient's primary care provider. A copy of this encounter was shared with the provider.

## 2018-05-14 NOTE — MR AVS SNAPSHOT
After Visit Summary   5/14/2018    Jesus Alberto Denson    MRN: [id number]           Patient Information     Date Of Birth          1975        Visit Information        Provider Department      5/14/2018 9:30 AM AN DIABETIC ED RESOURCE [place] [facility] [place]        Care Instructions    My Diabetes Care Goals:    Healthy Eating: I will not skip a meal.      Being Active: I will continue to walk to the park with the kids    Monitoring: I will check your BG when you wake up and another time during the day    Taking Medication: I will continue taking Metformin  mg taking 2 pills with breakfast and 2 pills with dinner  Victoza take 1.2 mg daily  When using up the Victoza, start Trulicity the next day , and take weekly.  When you take the 4th pen of Trulicity, wait 1 week and then restart Victoza at 1.2 mg daily.  Order more Victoza when you use the last Trulicity pen.     Next A1C is due Aug. 2018.      Follow up:  Follow-up diabetes education appointment scheduled Nov. 5 @ 9:30    Follow up with Dr. Flores as she recommends for diabetes.     Bring blood glucose meter and logbook with you to all doctor and follow-up appointments.     [place] [facility] for the [facility]:  For Your Diabetes Education and Nutrition Appointments Call:  [phone]   For Diabetes Education or Nutrition Related Questions:   Phone: [phone]  E-mail: [email or web address]  Fax: [phone]   If you need a medication refill please contact your pharmacy. Please allow 3 business days for your refills to be completed.    Instructions for emailing the Diabetes Educators    If you need to communicate a non-urgent message to a Diabetes Educator via email, please send to [email or web address].    Please follow the following email guidelines:    Subject line: Secure: your clinic name (example: Secure: [facility])  In the email please include: First name, middle initial, last  name and date of birth.    We will be in touch with you within one (1) business day.             Follow-ups after your visit        Your next 10 appointments already scheduled     May 16, 2018  9:20 AM CDT   [facility]   [facility]    [address] [facility] [address] [phone]            Nov 05, 2018  9:30 AM CST   Diabetic Education with AN DIABETIC ED RESOURCE   [facility] ([facility])    [address] [facility] [address] [phone]              Who to contact     If you have questions or need follow up information about today's clinic visit or your schedule please contact [facility] directly at [phone].  Normal or non-critical lab and imaging results will be communicated to you by MyChart, letter or phone within 4 business days after the clinic has received the results. If you do not hear from us within 7 days, please contact the clinic through [employer] or phone. If you have a critical or abnormal lab result, we will notify you by phone as soon as possible.  Submit refill requests through [employer] or call your pharmacy and they will forward the refill request to us. Please allow 3 business days for your refill to be completed.          Additional Information About Your Visit        [employer] Information     [employer] gives you secure access to your electronic health record. If you see a primary care provider, you can also send messages to your care team and make appointments. If you have questions, please call your primary care clinic.  If you do not have a primary care provider, please call [phone] and they will assist you.        Care EveryWhere ID     This is your Care EveryWhere ID. This could be used by other organizations to access your [place] medical records  [id number]        Your Vitals Were     Last Period                   04/16/2018             Blood Pressure from Last 3 Encounters:   05/07/18 110/68   04/24/18 144/84   02/26/18 126/80    Weight from Last 3 Encounters:   04/24/18 69.9 kg (154 lb)   04/02/18 65.8 kg (145 lb)   02/26/18 67.1 kg (148 lb)              Today, you had the following     No orders found for display       Primary Care Provider Office Phone # Fax #    Edilia Xie [phone] [address] [facility] [address]        Equal Access to Services     [facility]: Hadii aad ku hadasho Soomaali, waaxda luqadaha, qaybta kaalmada adeegyada, cuong villar . So [facility] [phone].    ATENCIÓN: Si habla español, tiene a mcdowell disposición servicios gratuitos de asistencia lingüística. [facility] [phone].    We comply with applicable federal civil rights laws and [place] laws. We do not discriminate on the basis of race, color, national origin, age, disability, sex, sexual orientation, or gender identity.            Thank you!     Thank you for choosing [facility] [place]  for your care. Our goal is always to provide you with excellent care. Hearing back from our patients is one way we can continue to improve our services. Please take a few minutes to complete the written survey that you may receive in the mail after your visit with us. Thank you!             Your Updated Medication List - Protect others around you: Learn how to safely use, store and throw away your medicines at www.disposemymeds.org.          This list is accurate as of 5/14/18 10:03 AM.  Always use your most recent med list.                   Brand Name Dispense Instructions for use Diagnosis    atorvastatin 40 MG tablet    LIPITOR    90 tablet    Take 1 tablet (40 mg) by mouth daily    Hyperlipidemia with target LDL less than 100       ferrous sulfate 325 (65 Fe) MG tablet    IRON    180 tablet    1 tablet twice daily    Type 2 diabetes, HbA1C goal < 8% (H)       insulin pen needle 32G X 4 MM    BD  ANAT U/F    100 each    Use 1 daily as directed.    Type 2 diabetes, uncontrolled, with renal manifestation (H)       liraglutide 18 MG/3ML soln    VICTOZA    6 mL    Inject 1.2 mg Subcutaneous daily    Type 2 diabetes, uncontrolled, with renal manifestation (H)       metFORMIN 500 MG 24 hr tablet    GLUCOPHAGE-XR    360 tablet    Take 2 tablets (1,000 mg) by mouth 2 times daily (with meals)    Type 2 diabetes, uncontrolled, with renal manifestation (H)       order for DME     1 each    Equipment being ordered: glucometer with lancets and strips to check sugars tid    Uncontrolled type 2 diabetes mellitus with chronic kidney disease, with long-term current use of insulin, unspecified CKD stage (H)       vitamin D 2000 units tablet      Take 2,000 Units by mouth        vitamin D 53976 UNIT capsule    ERGOCALCIFEROL    8 capsule    Take 1 capsule (50,000 Units) by mouth every 7 days for 8 doses . Then, take over-the-counter 2000 units daily indefinitely (no refills)    Vitamin D deficiency

## 2018-05-16 ENCOUNTER — OFFICE VISIT (OUTPATIENT)
Dept: FAMILY MEDICINE | Facility: CLINIC | Age: 43
End: 2018-05-16
Payer: COMMERCIAL

## 2018-05-16 VITALS
RESPIRATION RATE: 20 BRPM | DIASTOLIC BLOOD PRESSURE: 76 MMHG | HEART RATE: 98 BPM | TEMPERATURE: 98.4 F | BODY MASS INDEX: 25.16 KG/M2 | OXYGEN SATURATION: 100 % | SYSTOLIC BLOOD PRESSURE: 122 MMHG | WEIGHT: 151 LBS | HEIGHT: 65 IN

## 2018-05-16 DIAGNOSIS — N18.1 CKD (CHRONIC KIDNEY DISEASE) STAGE 1, GFR 90 ML/MIN OR GREATER: ICD-10-CM

## 2018-05-16 DIAGNOSIS — E78.5 HYPERLIPIDEMIA WITH TARGET LDL LESS THAN 100: ICD-10-CM

## 2018-05-16 DIAGNOSIS — I12.9 RENAL HYPERTENSION: ICD-10-CM

## 2018-05-16 PROCEDURE — 99214 OFFICE O/P EST MOD 30 MIN: CPT | Performed by: FAMILY MEDICINE

## 2018-05-16 RX ORDER — LISINOPRIL 5 MG/1
2.5 TABLET ORAL DAILY
Qty: 45 TABLET | Refills: 1 | Status: SHIPPED | OUTPATIENT
Start: 2018-05-16 | End: 2018-12-20

## 2018-05-16 RX ORDER — ASPIRIN 81 MG/1
81 TABLET, CHEWABLE ORAL DAILY
Qty: 108 TABLET | Refills: 3
Start: 2018-05-16

## 2018-05-16 NOTE — PROGRESS NOTES
SUBJECTIVE:   Jesus Alberto Denson is a 42 year old female who presents to clinic today for the following health issues:        Diabetes Follow-up    Patient is checking blood sugars: twice daily.    Blood sugar testing frequency justification: Uncontrolled diabetes  Results are as follows:         130-140    Diabetic concerns: None     Symptoms of hypoglycemia (low blood sugar): none     Paresthesias (numbness or burning in feet) or sores: No     Date of last diabetic eye exam: nov 2017    Hyperlipidemia Follow-Up      Rate your low fat/cholesterol diet?: good    Taking statin?  Yes, no muscle aches from statin    Other lipid medications/supplements?:  none    Hypertension Follow-up      Outpatient blood pressures are being checked at store.  Results are in range .    Low Salt Diet: not monitoring salt    BP Readings from Last 2 Encounters:   05/16/18 122/76   05/07/18 110/68     Hemoglobin A1C (%)   Date Value   04/24/2018 8.6 (H)   02/07/2018 11.3 (H)     LDL Cholesterol Calculated (mg/dL)   Date Value   02/07/2018 84   03/27/2015     Cannot estimate LDL when triglyceride exceeds 400 mg/dL     LDL Cholesterol Direct (mg/dL)   Date Value   04/24/2018 153 (H)       Amount of exercise or physical activity: 2-3 times per day     Problems taking medications regularly: No    Medication side effects: none    Diet: regular (no restrictions)      Pt with diabetes not at goal  Just saw diab ed and plan is improved diet and exercise and then recheck in 3 months  She is on statin but she gets cramps on the days she takes it. Will have her take 1/2 tablet and if still having cramps she should mychart me  Does have protein in her urine so did recommend starting acei. She is agreeable to lisinopril 2.5 mg daily. Any cough or angioedema with this, she will stop and let me know  She will start an asa 81 g daily      Problem list and histories reviewed & adjusted, as indicated.  Additional history: as documented    Labs reviewed in  "EPIC    Reviewed and updated as needed this visit by clinical staff  Tobacco  Allergies  Meds  Med Hx  Surg Hx  Fam Hx  Soc Hx      Reviewed and updated as needed this visit by Provider         ROS:  Constitutional, HEENT, cardiovascular, pulmonary, gi and gu systems are negative, except as otherwise noted.    OBJECTIVE:     /76  Pulse 98  Temp 98.4  F (36.9  C) (Oral)  Resp 20  Ht 5' 5\" (1.651 m)  Wt 151 lb (68.5 kg)  LMP 04/16/2018  SpO2 100%  BMI 25.13 kg/m2  Body mass index is 25.13 kg/(m^2).  GENERAL: healthy, alert and no distress  NECK: no adenopathy, no asymmetry, masses, or scars and thyroid normal to palpation  RESP: lungs clear to auscultation - no rales, rhonchi or wheezes  CV: regular rate and rhythm, normal S1 S2, no S3 or S4, no murmur, click or rub, no peripheral edema and peripheral pulses strong  ABDOMEN: soft, nontender, no hepatosplenomegaly, no masses and bowel sounds normal  MS: no gross musculoskeletal defects noted, no edema    Diagnostic Test Results:  none     ASSESSMENT/PLAN:     1. Uncontrolled type 2 diabetes mellitus with stage 1 chronic kidney disease, without long-term current use of insulin (H)  As above, fu in 3 months after starting life style changes  - lisinopril (PRINIVIL/ZESTRIL) 5 MG tablet; Take 0.5 tablets (2.5 mg) by mouth daily  Dispense: 45 tablet; Refill: 1  - aspirin 81 MG chewable tablet; Take 1 tablet (81 mg) by mouth daily  Dispense: 108 tablet; Refill: 3    2. CKD (chronic kidney disease) stage 1, GFR 90 ml/min or greater  Agreeable to starting acei    3. Hyperlipidemia with target LDL less than 100  On statin as above    4. Renal hypertension  At goal           Mary Singh MD  [facility]    "

## 2018-05-16 NOTE — MR AVS SNAPSHOT
After Visit Summary   5/16/2018    Jesus Alberto Denson    MRN: [id number]           Patient Information     Date Of Birth          1975        Visit Information        Provider Department      5/16/2018 9:20 AM Mary Flores MD [facility]        Today's Diagnoses     Uncontrolled type 2 diabetes mellitus with stage 1 chronic kidney disease, without long-term current use of insulin (H)    -  1    CKD (chronic kidney disease) stage 1, GFR 90 ml/min or greater        Hyperlipidemia with target LDL less than 100        Renal hypertension           Follow-ups after your visit        Your next 10 appointments already scheduled     Sep 17, 2018  9:20 AM CDT   [facility]   [facility]    [address] [facility] [address] [phone]            Nov 05, 2018  9:30 AM CST   Diabetic Education with AN DIABETIC ED RESOURCE   [facility] ([facility])    [address] [facility] [address] [phone]              Who to contact     If you have questions or need follow up information about today's clinic visit or your schedule please contact [facility] directly at [phone].  Normal or non-critical lab and imaging results will be communicated to you by MyChart, letter or phone within 4 business days after the clinic has received the results. If you do not hear from us within 7 days, please contact the clinic through MyChart or phone. If you have a critical or abnormal lab result, we will notify you by phone as soon as possible.  Submit refill requests through Crew or call your pharmacy and they will forward the refill request to us. Please allow 3 business days for your refill to be completed.          Additional Information About Your Visit        Radiation Monitoring Deviceshart Information     Crew gives you secure access to your electronic health record. If you see a primary care  "provider, you can also send messages to your care team and make appointments. If you have questions, please call your primary care clinic.  If you do not have a primary care provider, please call [phone] and they will assist you.        Care EveryWhere ID     This is your Care EveryWhere ID. This could be used by other organizations to access your [facility] medical records  [id number]        Your Vitals Were     Pulse Temperature Respirations Height Last Period Pulse Oximetry    98 98.4  F (36.9  C) (Oral) 20 5' 5\" (1.651 m) 04/16/2018 100%    BMI (Body Mass Index)                   25.13 kg/m2            Blood Pressure from Last 3 Encounters:   05/16/18 122/76   05/07/18 110/68   04/24/18 144/84    Weight from Last 3 Encounters:   05/16/18 151 lb (68.5 kg)   04/24/18 154 lb (69.9 kg)   04/02/18 145 lb (65.8 kg)              Today, you had the following     No orders found for display         Today's Medication Changes          These changes are accurate as of 5/16/18 10:24 AM.  If you have any questions, ask your nurse or doctor.               Start taking these medicines.        Dose/Directions    aspirin 81 MG chewable tablet   Used for:  Uncontrolled type 2 diabetes mellitus with stage 1 chronic kidney disease, without long-term current use of insulin (H)   Started by:  Mary Flores MD        Dose:  81 mg   Take 1 tablet (81 mg) by mouth daily   Quantity:  108 tablet   Refills:  3       lisinopril 5 MG tablet   Commonly known as:  PRINIVIL/ZESTRIL   Used for:  Uncontrolled type 2 diabetes mellitus with stage 1 chronic kidney disease, without long-term current use of insulin (H)   Started by:  Mary Flores MD        Dose:  2.5 mg   Take 0.5 tablets (2.5 mg) by mouth daily   Quantity:  45 tablet   Refills:  1            Where to get your medicines      These medications were sent to [facility]/pharmacy #[id number] - [place], [place] - [address] AT CORNER OF [facility]  [address] " , [facility] [address]     Phone:  [phone]     lisinopril 5 MG tablet         Some of these will need a paper prescription and others can be bought over the counter.  Ask your nurse if you have questions.     You don't need a prescription for these medications     aspirin 81 MG chewable tablet                Primary Care Provider Office Phone # Fax #    Edilia Xie [phone]       [address]  [place] [address]        Equal Access to Services     CHALO ALFARO : Hadii aad ku hadasho Soomaali, waaxda luqadaha, qaybta kaalmada adeegyada, waxay idiin hayaan adeeg kharash la'maryellen . So [facility] [phone].    ATENCIÓN: Si habla español, tiene a mcdowell disposición servicios gratuitos de asistencia lingüística. [facility] [phone].    We comply with applicable federal civil rights laws and [place] laws. We do not discriminate on the basis of race, color, national origin, age, disability, sex, sexual orientation, or gender identity.            Thank you!     Thank you for choosing [facility]  for your care. Our goal is always to provide you with excellent care. Hearing back from our patients is one way we can continue to improve our services. Please take a few minutes to complete the written survey that you may receive in the mail after your visit with us. Thank you!             Your Updated Medication List - Protect others around you: Learn how to safely use, store and throw away your medicines at www.disposemymeds.org.          This list is accurate as of 5/16/18 10:24 AM.  Always use your most recent med list.                   Brand Name Dispense Instructions for use Diagnosis    aspirin 81 MG chewable tablet     108 tablet    Take 1 tablet (81 mg) by mouth daily    Uncontrolled type 2 diabetes mellitus with stage 1 chronic kidney disease, without long-term current use of insulin (H)       atorvastatin 40 MG tablet    LIPITOR    90 tablet    Take 1 tablet (40 mg) by mouth  daily    Hyperlipidemia with target LDL less than 100       ferrous sulfate 325 (65 Fe) MG tablet    IRON    180 tablet    1 tablet twice daily    Type 2 diabetes, HbA1C goal < 8% (H)       insulin pen needle 32G X 4 MM    BD ANAT U/F    100 each    Use 1 daily as directed.    Type 2 diabetes, uncontrolled, with renal manifestation (H)       liraglutide 18 MG/3ML soln    VICTOZA    6 mL    Inject 1.2 mg Subcutaneous daily    Type 2 diabetes, uncontrolled, with renal manifestation (H)       lisinopril 5 MG tablet    PRINIVIL/ZESTRIL    45 tablet    Take 0.5 tablets (2.5 mg) by mouth daily    Uncontrolled type 2 diabetes mellitus with stage 1 chronic kidney disease, without long-term current use of insulin (H)       metFORMIN 500 MG 24 hr tablet    GLUCOPHAGE-XR    360 tablet    Take 2 tablets (1,000 mg) by mouth 2 times daily (with meals)    Type 2 diabetes, uncontrolled, with renal manifestation (H)       order for DME     1 each    Equipment being ordered: glucometer with lancets and strips to check sugars tid    Uncontrolled type 2 diabetes mellitus with chronic kidney disease, with long-term current use of insulin, unspecified CKD stage (H)       vitamin D 2000 units tablet      Take 2,000 Units by mouth        vitamin D 72103 UNIT capsule    ERGOCALCIFEROL    8 capsule    Take 1 capsule (50,000 Units) by mouth every 7 days for 8 doses . Then, take over-the-counter 2000 units daily indefinitely (no refills)    Vitamin D deficiency

## 2018-05-30 ENCOUNTER — ALLIED HEALTH/NURSE VISIT (OUTPATIENT)
Dept: NURSING | Facility: CLINIC | Age: 43
End: 2018-05-30
Payer: COMMERCIAL

## 2018-05-30 DIAGNOSIS — Z09 NEED FOR IMMUNIZATION FOLLOW-UP: Primary | ICD-10-CM

## 2018-05-30 PROCEDURE — 90746 HEPB VACCINE 3 DOSE ADULT IM: CPT

## 2018-05-30 PROCEDURE — 99207 ZZC NO CHARGE NURSE ONLY: CPT

## 2018-05-30 PROCEDURE — 90471 IMMUNIZATION ADMIN: CPT

## 2018-05-30 NOTE — PROGRESS NOTES
Screening Questionnaire for Adult Immunization    Are you sick today?   No   Do you have allergies to medications, food, a vaccine component or latex?   No   Have you ever had a serious reaction after receiving a vaccination?   No   Do you have a long-term health problem with heart disease, lung disease, asthma, kidney disease, metabolic disease (e.g. diabetes), anemia, or other blood disorder?   No   Do you have cancer, leukemia, HIV/AIDS, or any other immune system problem?   No   In the past 3 months, have you taken medications that affect  your immune system, such as prednisone, other steroids, or anticancer drugs; drugs for the treatment of rheumatoid arthritis, Crohn s disease, or psoriasis; or have you had radiation treatments?   No   Have you had a seizure, or a brain or other nervous system problem?   No   During the past year, have you received a transfusion of blood or blood     products, or been given immune (gamma) globulin or antiviral drug?   No   For women: Are you pregnant or is there a chance you could become        pregnant during the next month?   No   Have you received any vaccinations in the past 4 weeks?   No     Immunization questionnaire answers were all negative.      Per orders of Dr. Garcia, injection of Hep B given by Nichole Milner. Patient instructed to remain in clinic for 15 minutes afterwards, and to report any adverse reaction to me immediately.     Screening performed by Nichole Milner on 5/30/2018 at 11:32 AM.

## 2018-05-30 NOTE — MR AVS SNAPSHOT
After Visit Summary   5/30/2018    Jesus Alberto Denson    MRN: [id number]           Patient Information     Date Of Birth          1975        Visit Information        Provider Department      5/30/2018 11:20 AM [facility]        Today's Diagnoses     Need for immunization follow-up    -  1       Follow-ups after your visit        Your next 10 appointments already scheduled     Sep 17, 2018  9:20 AM CDT   [facility]    [address] [facility] [address] [phone]            Nov 05, 2018  9:30 AM CST   Diabetic Education with AN DIABETIC ED RESOURCE   [facility] ([facility])    [address] [facility] [address] [phone]              Who to contact     If you have questions or need follow up information about today's clinic visit or your schedule please contact [facility] directly at [phone].  Normal or non-critical lab and imaging results will be communicated to you by PandoDailyhart, letter or phone within 4 business days after the clinic has received the results. If you do not hear from us within 7 days, please contact the clinic through Popegot or phone. If you have a critical or abnormal lab result, we will notify you by phone as soon as possible.  Submit refill requests through kingsky or call your pharmacy and they will forward the refill request to us. Please allow 3 business days for your refill to be completed.          Additional Information About Your Visit        PandoDailyhart Information     kingsky gives you secure access to your electronic health record. If you see a primary care provider, you can also send messages to your care team and make appointments. If you have questions, please call your primary care clinic.  If you do not have a primary care provider, please call [phone] and they will assist  you.        Care EveryWhere ID     This is your Care EveryWhere ID. This could be used by other organizations to access your [facility] medical records  [id number]         Blood Pressure from Last 3 Encounters:   05/16/18 122/76   05/07/18 110/68   04/24/18 144/84    Weight from Last 3 Encounters:   05/16/18 151 lb (68.5 kg)   04/24/18 154 lb (69.9 kg)   04/02/18 145 lb (65.8 kg)              We Performed the Following     ADMIN 1st VACCINE     HEPATITIS B VACCINE,ADULT,IM        Primary Care Provider Office Phone # Fax #    Edilia Xie [phone] [address] [facility] [facility] [address]        Equal Access to Services     [facility] DOMINIC : Hadii aad ku hadasho Soomaali, waaxda luqadaha, qaybta kaalmada adeegyada, waxay idiin haymemen nils villar . So [facility] [phone].    ATENCIÓN: Si habla español, tiene a mcdowell disposición servicios gratuitos de asistencia lingüística. [facility] [phone].    We comply with applicable federal civil rights laws and [place] laws. We do not discriminate on the basis of race, color, national origin, age, disability, sex, sexual orientation, or gender identity.            Thank you!     Thank you for choosing [facility]  for your care. Our goal is always to provide you with excellent care. Hearing back from our patients is one way we can continue to improve our services. Please take a few minutes to complete the written survey that you may receive in the mail after your visit with us. Thank you!             Your Updated Medication List - Protect others around you: Learn how to safely use, store and throw away your medicines at www.disposemymeds.org.          This list is accurate as of 5/30/18 11:46 AM.  Always use your most recent med list.                   Brand Name Dispense Instructions for use Diagnosis    aspirin 81 MG chewable tablet     108 tablet    Take 1 tablet (81 mg) by mouth daily    Uncontrolled type 2 diabetes  mellitus with stage 1 chronic kidney disease, without long-term current use of insulin (H)       atorvastatin 40 MG tablet    LIPITOR    90 tablet    Take 1 tablet (40 mg) by mouth daily    Hyperlipidemia with target LDL less than 100       ferrous sulfate 325 (65 Fe) MG tablet    IRON    180 tablet    1 tablet twice daily    Type 2 diabetes, HbA1C goal < 8% (H)       insulin pen needle 32G X 4 MM    BD ANAT U/F    100 each    Use 1 daily as directed.    Type 2 diabetes, uncontrolled, with renal manifestation (H)       liraglutide 18 MG/3ML soln    VICTOZA    6 mL    Inject 1.2 mg Subcutaneous daily    Type 2 diabetes, uncontrolled, with renal manifestation (H)       lisinopril 5 MG tablet    PRINIVIL/ZESTRIL    45 tablet    Take 0.5 tablets (2.5 mg) by mouth daily    Uncontrolled type 2 diabetes mellitus with stage 1 chronic kidney disease, without long-term current use of insulin (H)       metFORMIN 500 MG 24 hr tablet    GLUCOPHAGE-XR    360 tablet    Take 2 tablets (1,000 mg) by mouth 2 times daily (with meals)    Type 2 diabetes, uncontrolled, with renal manifestation (H)       order for DME     1 each    Equipment being ordered: glucometer with lancets and strips to check sugars tid    Uncontrolled type 2 diabetes mellitus with chronic kidney disease, with long-term current use of insulin, unspecified CKD stage (H)       vitamin D 2000 units tablet      Take 2,000 Units by mouth        vitamin D 89673 UNIT capsule    ERGOCALCIFEROL    8 capsule    Take 1 capsule (50,000 Units) by mouth every 7 days for 8 doses . Then, take over-the-counter 2000 units daily indefinitely (no refills)    Vitamin D deficiency

## 2018-06-12 ENCOUNTER — TELEPHONE (OUTPATIENT)
Dept: FAMILY MEDICINE | Facility: CLINIC | Age: 43
End: 2018-06-12

## 2018-06-12 NOTE — TELEPHONE ENCOUNTER
Panel Management Review      Patient has the following on her problem list:     Diabetes    ASA: Passed    Last A1C  Lab Results   Component Value Date    A1C 8.6 04/24/2018    A1C 11.3 02/07/2018    A1C 7.6 10/06/2013    A1C 7.5 05/03/2013    A1C 7.1 03/08/2013     A1C tested: FAILED    Last LDL:    Lab Results   Component Value Date    CHOL 193 02/07/2018     Lab Results   Component Value Date    HDL 35 02/07/2018     Lab Results   Component Value Date     04/24/2018    LDL 84 02/07/2018     Lab Results   Component Value Date    TRIG 369 02/07/2018     Lab Results   Component Value Date    CHOLHDLRATIO 7.9 03/27/2015     Lab Results   Component Value Date    NHDL 158 02/07/2018       Is the patient on a Statin? YES             Is the patient on Aspirin? YES    Medications     HMG CoA Reductase Inhibitors    atorvastatin (LIPITOR) 40 MG tablet    Salicylates    aspirin 81 MG chewable tablet          Last three blood pressure readings:  BP Readings from Last 3 Encounters:   05/16/18 122/76   05/07/18 110/68   04/24/18 144/84       Date of last diabetes office visit: 5-16-18     Tobacco History:     History   Smoking Status     Never Smoker   Smokeless Tobacco     Never Used     Comment: non-smoking household         Hypertension   Last three blood pressure readings:  BP Readings from Last 3 Encounters:   05/16/18 122/76   05/07/18 110/68   04/24/18 144/84     Blood pressure: Passed    HTN Guidelines:  Age 18-59 BP range:  Less than 140/90  Age 60-85 with Diabetes:  Less than 140/90  Age 60-85 without Diabetes:  less than 150/90      Composite cancer screening  Chart review shows that this patient is due/due soon for the following None  Summary:    Patient is due/failing the following:   A1C    Action needed:   none    Type of outreach:    none    Questions for provider review:    Spoke with . Patient to F/U with , per  last appointment note.  Faby MARTINEZ MA                                                                                                                                       Faby MARTINEZ MA

## 2018-06-18 RX ORDER — ERGOCALCIFEROL 1.25 MG/1
CAPSULE, LIQUID FILLED ORAL
Start: 2018-06-18

## 2018-06-18 NOTE — TELEPHONE ENCOUNTER
Cholecalciferol (VITAMIN D) 2000 UNITS tablet      Last Written Prescription Date:  ?  Last Fill Quantity: ?,   # refills: ?  Last Office Visit: 4/24/2018  Future Office visit:       Routing refill request to provider for review/approval because:  Medication is reported/historical

## 2018-09-23 NOTE — LETTER
September 25, 2018    Jesus Alberto Denson  [address] [place], [place] [address] [facility] [address]    Dear Jesus Alberto,       We recently received a refill request for metFORMIN (GLUCOPHAGE-XR) 500 MG 24 hr tablet.  We have refilled this for a one time 60 day supply only because you are due for a:    Diabetes office visit and fasting lab appointment      Please schedule this lab appointment 4-5 days prior to the office visit.     Please call at your earliest convenience so that there will not be a delay with your future refills.          Thank you,   Your [facility] Team/  [phone]

## 2018-09-24 ENCOUNTER — TELEPHONE (OUTPATIENT)
Dept: FAMILY MEDICINE | Facility: CLINIC | Age: 43
End: 2018-09-24

## 2018-09-24 RX ORDER — METFORMIN HCL 500 MG
TABLET, EXTENDED RELEASE 24 HR ORAL
Qty: 120 TABLET | Refills: 0 | Status: SHIPPED | OUTPATIENT
Start: 2018-09-24 | End: 2018-12-16

## 2018-09-24 NOTE — LETTER
October 2, 2018          Jesus Alberto Denson,  [address] [facility] [address]        Dear Jesus Alberto Denson      Monitoring and managing your preventative and chronic health conditions are very important to us. Our records indicate that you have not scheduled for Diabetic Check  which was recommended by Dr. Xie.      If you have received your health care elsewhere, please call the clinic so the information can be documented in your chart.    Please call [phone] or message us through your [employer] account to schedule an appointment or provide information for your chart.     Feel free to contact us if you have any questions or concerns!    I look forward to seeing you and working with you on your health care needs.     Sincerely,       Your [facility] [facility] care team

## 2018-09-24 NOTE — TELEPHONE ENCOUNTER
Medication is being filled for 1 time refill only due to:  Patient needs to be seen for fasting lab appointment and appointment with the provider for further refills.  Diabetes.  Crystal GODOYN, RN

## 2018-09-24 NOTE — TELEPHONE ENCOUNTER
----- Message from Edilia Xie MD sent at 9/24/2018  8:45 AM CDT -----  Regarding: Panel Management   Please schedule diabetes follow-up for A1c and confirm listed PCP  Edilia Xie MD

## 2018-09-24 NOTE — TELEPHONE ENCOUNTER
Called patient and left VM to call clinic in regards to below message. Please help schedule DM follow up if patient returns call.  Barbara WEATHERS CMA ([facility])

## 2018-09-26 NOTE — TELEPHONE ENCOUNTER
Attempt 2, called patient and left VM to call clinic in regards to below message.   Barbara WEATHERS CMA ([facility])

## 2018-11-05 ENCOUNTER — TELEPHONE (OUTPATIENT)
Dept: EDUCATION SERVICES | Facility: CLINIC | Age: 43
End: 2018-11-05

## 2018-11-05 DIAGNOSIS — Z53.9 NO SHOW: Primary | ICD-10-CM

## 2018-12-05 ENCOUNTER — OFFICE VISIT (OUTPATIENT)
Dept: URGENT CARE | Facility: URGENT CARE | Age: 43
End: 2018-12-05
Payer: COMMERCIAL

## 2018-12-05 VITALS
OXYGEN SATURATION: 100 % | WEIGHT: 147 LBS | DIASTOLIC BLOOD PRESSURE: 60 MMHG | SYSTOLIC BLOOD PRESSURE: 131 MMHG | TEMPERATURE: 97.8 F | RESPIRATION RATE: 16 BRPM | BODY MASS INDEX: 24.46 KG/M2 | HEART RATE: 95 BPM

## 2018-12-05 DIAGNOSIS — J01.90 ACUTE SINUSITIS WITH SYMPTOMS > 10 DAYS: Primary | ICD-10-CM

## 2018-12-05 PROCEDURE — 99214 OFFICE O/P EST MOD 30 MIN: CPT | Performed by: FAMILY MEDICINE

## 2018-12-05 RX ORDER — CEFDINIR 300 MG/1
300 CAPSULE ORAL 2 TIMES DAILY
Qty: 20 CAPSULE | Refills: 0 | Status: SHIPPED | OUTPATIENT
Start: 2018-12-05 | End: 2018-12-20

## 2018-12-05 ASSESSMENT — PAIN SCALES - GENERAL: PAINLEVEL: SEVERE PAIN (6)

## 2018-12-05 NOTE — MR AVS SNAPSHOT
After Visit Summary   12/5/2018    Jesus Alberto Denson    MRN: [id number]           Patient Information     Date Of Birth          1975        Visit Information        Provider Department      12/5/2018 7:10 PM Syeda Rich MD [facility]        Today's Diagnoses     Acute sinusitis with symptoms > 10 days    -  1    Type 2 diabetes, uncontrolled, with renal manifestation (H)           Follow-ups after your visit        Your next 10 appointments already scheduled     Dec 20, 2018  9:40 AM CST   Office Visit with Mary Flores MD   [facility]    [address] [facility] [address] [phone]           Bring a current list of meds and any records pertaining to this visit. For Physicals, please bring immunization records and any forms needing to be filled out. Please arrive 10 minutes early to complete paperwork.              Who to contact     If you have questions or need follow up information about today's clinic visit or your schedule please contact [facility] directly at [phone].  Normal or non-critical lab and imaging results will be communicated to you by MyChart, letter or phone within 4 business days after the clinic has received the results. If you do not hear from us within 7 days, please contact the clinic through [employer] or phone. If you have a critical or abnormal lab result, we will notify you by phone as soon as possible.  Submit refill requests through pMDsoft or call your pharmacy and they will forward the refill request to us. Please allow 3 business days for your refill to be completed.          Additional Information About Your Visit        MyChart Information     p[employer] gives you secure access to your electronic health record. If you see a primary care provider, you can also send messages to your care team and make appointments. If you have questions, please call your primary care clinic.   If you do not have a primary care provider, please call [phone] and they will assist you.        Care EveryWhere ID     This is your Care EveryWhere ID. This could be used by other organizations to access your [facility] medical records  [id number]        Your Vitals Were     Pulse Temperature Respirations Pulse Oximetry Breastfeeding? BMI (Body Mass Index)    95 97.8  F (36.6  C) (Oral) 16 100% No 24.46 kg/m2       Blood Pressure from Last 3 Encounters:   12/05/18 131/60   05/16/18 122/76   05/07/18 110/68    Weight from Last 3 Encounters:   12/05/18 147 lb (66.7 kg)   05/16/18 151 lb (68.5 kg)   04/24/18 154 lb (69.9 kg)              Today, you had the following     No orders found for display         Today's Medication Changes          These changes are accurate as of 12/5/18  9:11 PM.  If you have any questions, ask your nurse or doctor.               Start taking these medicines.        Dose/Directions    cefdinir 300 MG capsule   Commonly known as:  OMNICEF   Used for:  Acute sinusitis with symptoms > 10 days, Type 2 diabetes, uncontrolled, with renal manifestation (H)   Started by:  Syeda Rich MD        Dose:  300 mg   Take 1 capsule (300 mg) by mouth 2 times daily   Quantity:  20 capsule   Refills:  0            Where to get your medicines      These medications were sent to [facility]/pharmacy #[id number] - [address],  AT [address], [facility] [address]     Phone:  [phone]     cefdinir 300 MG capsule                Primary Care Provider Office Phone # Fax #    Edilia Xie [phone]       [address] [place] [address]        Equal Access to Services     CHALO ALFARO AH: Gema Valdes, warochelle garcia, qaybta kaalmasandy wilson, cuong lopez. So [facility] [phone].    ATENCIÓN: Si habla español, tiene a mcdowell disposición servicios gratuitos de  asistencia lingüística. Amadeo al [phone].    We comply with applicable federal civil rights laws and [place] laws. We do not discriminate on the basis of race, color, national origin, age, disability, sex, sexual orientation, or gender identity.            Thank you!     Thank you for [facility]  [facility]. Our goal is always to provide you with excellent care. Hearing back from our patients is one way we can continue to improve our services. Please take a few minutes to complete the written survey that you may receive in the mail after your visit with us. Thank you!             Your Updated Medication List - Protect others around you: Learn how to safely use, store and throw away your medicines at www.disposemymeds.org.          This list is accurate as of 12/5/18  9:11 PM.  Always use your most recent med list.                   Brand Name Dispense Instructions for use Diagnosis    aspirin 81 MG chewable tablet    ASA    108 tablet    Take 1 tablet (81 mg) by mouth daily    Uncontrolled type 2 diabetes mellitus with stage 1 chronic kidney disease, without long-term current use of insulin (H)       atorvastatin 40 MG tablet    LIPITOR    90 tablet    Take 1 tablet (40 mg) by mouth daily    Hyperlipidemia with target LDL less than 100       cefdinir 300 MG capsule    OMNICEF    20 capsule    Take 1 capsule (300 mg) by mouth 2 times daily    Acute sinusitis with symptoms > 10 days, Type 2 diabetes, uncontrolled, with renal manifestation (H)       ferrous sulfate 325 (65 Fe) MG tablet    FEROSUL    180 tablet    1 tablet twice daily    Type 2 diabetes, HbA1C goal < 8% (H)       insulin pen needle 32G X 4 MM miscellaneous    BD ANAT U/F    100 each    Use 1 daily as directed.    Type 2 diabetes, uncontrolled, with renal manifestation (H)       liraglutide 18 MG/3ML solution    VICTOZA    6 mL    Inject 1.2 mg Subcutaneous daily    Type 2 diabetes, uncontrolled, with renal manifestation  (H)       lisinopril 5 MG tablet    PRINIVIL/ZESTRIL    45 tablet    Take 0.5 tablets (2.5 mg) by mouth daily    Uncontrolled type 2 diabetes mellitus with stage 1 chronic kidney disease, without long-term current use of insulin (H)       metFORMIN 500 MG 24 hr tablet    GLUCOPHAGE-XR    120 tablet    TAKE 2 TABLETS (1,000 MG) BY MOUTH 2 TIMES DAILY (WITH MEALS)    Type 2 diabetes, uncontrolled, with renal manifestation (H)       order for DME     1 each    Equipment being ordered: glucometer with lancets and strips to check sugars tid    Uncontrolled type 2 diabetes mellitus with chronic kidney disease, with long-term current use of insulin, unspecified CKD stage       vitamin D3 2000 units tablet    CHOLECALCIFEROL     Take 2,000 Units by mouth

## 2018-12-06 NOTE — NURSING NOTE
"Chief Complaint   Patient presents with     Headache     pt c/o headache, body ache, runny nose, sinus pressure and cough x 2 weeks       Initial /89  Pulse 95  Temp 97.8  F (36.6  C) (Oral)  Resp 16  Wt 147 lb (66.7 kg)  SpO2 100%  Breastfeeding? No  BMI 24.46 kg/m2 Estimated body mass index is 24.46 kg/(m^2) as calculated from the following:    Height as of 5/16/18: 5' 5\" (1.651 m).    Weight as of this encounter: 147 lb (66.7 kg).  Medication Reconciliation: complete  Justyna Szymanski MA    "

## 2018-12-06 NOTE — PROGRESS NOTES
Chief complaitn: sinus congestion    Patient with DM     Accompanied by     Having headache and sinus congestion   Sinus congestion for 2 weeks sore throat for 2 weeks  Headache past week  Colds, sinus congestion, facial pain  Cough Yes  Greenish discharge  Fever No  Progressively getting worse: YES  Thought was getting better then started getting worse: YES  Getting better:  No  Exposure to pertussis or pertussis like symptoms: No    Problem list and histories reviewed & adjusted, as indicated.  Additional history: as documented    Problem list, Medication list, Allergies, and Medical/Social/Surgical histories reviewed in [facility] and updated as appropriate.    ROS:  Constitutional, HEENT, cardiovascular, pulmonary, gi and gu systems are negative, except as otherwise noted.    OBJECTIVE:                                                    /60  Pulse 95  Temp 97.8  F (36.6  C) (Oral)  Resp 16  Wt 147 lb (66.7 kg)  SpO2 100%  Breastfeeding? No  BMI 24.46 kg/m2  Body mass index is 24.46 kg/(m^2).  GENERAL: healthy, alert and no distress  EYES: Eyes grossly normal to inspection, PERRL and conjunctivae and sclerae normal  HENT: ear canals and TM's normal, nose and mouth without ulcers or lesions  Sinuses: turbinates erythematous maxillary sinus tenderness and frontal sinus tenderness   NECK: no adenopathy, no asymmetry, masses, or scars and thyroid normal to palpation  RESP: lungs clear to auscultation - no rales, rhonchi or wheezes   CV: regular rate and rhythm, normal S1 S2, no S3 or S4, no murmur, click or rub, no peripheral edema and peripheral pulses strong  ABDOMEN: soft, nontender, no hepatosplenomegaly, no masses and bowel sounds normal  MS: no gross musculoskeletal defects noted, no edema  SKIN: no suspicious lesions or rashes  NEURO: Normal strength and tone, mentation intact and speech normal  Cranial nerves were intact. MMT 5/5 bilateral upper and lower extremities. Sensory was intact. No  gross neurologic deficits. Normal gait and cerebellar function. No meningeal signs  PSYCH: mentation appears normal, affect normal/bright    Diagnostic Test Results:  No results found for this or any previous visit (from the past 24 hour(s)).     ASSESSMENT/PLAN:                                                        ICD-10-CM    1. Acute sinusitis with symptoms > 10 days J01.90 cefdinir (OMNICEF) 300 MG capsule   2. Type 2 diabetes, uncontrolled, with renal manifestation (H) E11.29 cefdinir (OMNICEF) 300 MG capsule    E11.65      Prescribed with omnicef  Patient non-compliant. BP was elevated initially and rechecked normal. DM not well controlled  On further questioning apparently have not been taking her lisinopril.   Recommend starting her lisinopril again and follow up with primary care provider in 2weeks will need recheck and check labs. Patient voiced understanding  Alarm signs or symptoms discussed, if present recommend go to ER   Recommend follow up with primary care provider if no relief , sooner if worse  Adverse reactions of medications discussed.  Over the counter medications discussed.   Aware to come back in if with worsening symptoms or if no relief despite treatment plan  Patient voiced understanding and had no further questions.       MD Syeda Jack MD  [facility]

## 2018-12-17 NOTE — PROGRESS NOTES
SUBJECTIVE:   Jesus Alberto Denson is a 43 year old female who presents to clinic today for the following health issues:        Diabetes Follow-up      Patient is checking blood sugars: not checking currently    Diabetic concerns: other - pt has not had her meds in over a monh     Symptoms of hypoglycemia (low blood sugar): none     Paresthesias (numbness or burning in feet) or sores: Yes LT side     Date of last diabetic eye exam: 1.5 yrs ago    Diabetes Management Resources    Hyperlipidemia Follow-Up      Rate your low fat/cholesterol diet?: good    Taking statin?  Yes, no muscle aches from statin    Other lipid medications/supplements?:  none    Hypertension Follow-up      Outpatient blood pressures in the store sometimes    Low Salt Diet: no added salt    BP Readings from Last 2 Encounters:   12/20/18 116/68   12/05/18 131/60     Hemoglobin A1C (%)   Date Value   04/24/2018 8.6 (H)   02/07/2018 11.3 (H)     LDL Cholesterol Calculated (mg/dL)   Date Value   02/07/2018 84   03/27/2015     Cannot estimate LDL when triglyceride exceeds 400 mg/dL     LDL Cholesterol Direct (mg/dL)   Date Value   04/24/2018 153 (H)       Amount of exercise or physical activity: 2-3 days/week for an average of 30-45 minutes    Problems taking medications regularly: Yes,  When she has it     Medication side effects: none    Diet: regular (no restrictions)    Pt with diabetes, now well controlled in the past and now out of meds  Will check labs, restart meds and have her follow-up in 3 months  She is on and aspirin, statin and ace  She has some cold like symptoms and intermittent tingling in left foot          Problem list and histories reviewed & adjusted, as indicated.  Additional history: as documented    Labs reviewed in EPIC    Reviewed and updated as needed this visit by clinical staff  Tobacco  Allergies  Meds       Reviewed and updated as needed this visit by Provider         ROS:  Constitutional, HEENT, cardiovascular, pulmonary,  "gi and gu systems are negative, except as otherwise noted.    OBJECTIVE:     /68   Pulse 92   Temp 97.6  F (36.4  C) (Oral)   Resp 12   Ht 1.651 m (5' 5\")   Wt 67.1 kg (148 lb)   SpO2 97%   BMI 24.63 kg/m    Body mass index is 24.63 kg/m .  GENERAL: healthy, alert and no distress  NECK: no adenopathy, no asymmetry, masses, or scars and thyroid normal to palpation  RESP: lungs clear to auscultation - no rales, rhonchi or wheezes  CV: regular rate and rhythm, normal S1 S2, no S3 or S4, no murmur, click or rub, no peripheral edema and peripheral pulses strong  ABDOMEN: soft, nontender, no hepatosplenomegaly, no masses and bowel sounds normal  MS: no gross musculoskeletal defects noted, no edema    Diagnostic Test Results:  none     ASSESSMENT/PLAN:     1. Type 2 diabetes, uncontrolled, with renal manifestation (H)  Not controlled since out of meds, restart and recheck in 3 months  - liraglutide (VICTOZA) 18 MG/3ML solution; Inject 1.2 mg Subcutaneous daily  Dispense: 6 mL; Refill: 3  - metFORMIN (GLUCOPHAGE-XR) 500 MG 24 hr tablet; TAKE 2 TABLETS (1,000 MG) BY MOUTH 2 TIMES DAILY (WITH MEALS)  Dispense: 120 tablet; Refill: 0  - Hemoglobin A1c  - Basic metabolic panel  - insulin pen needle (BD ANAT U/F) 32G X 4 MM miscellaneous; Use 1 daily as directed.  Dispense: 100 each; Refill: 11  - blood glucose monitoring (NO BRAND SPECIFIED) test strip; Use to test blood sugar 1 times daily or as directed.  Dispense: 100 strip; Refill: 3  - blood glucose (NO BRAND SPECIFIED) lancets standard; Use to test blood sugar 1 times daily or as directed.  Dispense: 100 each; Refill: 3    2. CKD (chronic kidney disease) stage 1, GFR 90 ml/min or greater  On ace  - Basic metabolic panel    3. Hyperlipidemia with target LDL less than 100  On statin    4. Renal hypertension  At goal on meds  - lisinopril (PRINIVIL/ZESTRIL) 5 MG tablet; Take 0.5 tablets (2.5 mg) by mouth daily  Dispense: 45 tablet; Refill: 1        Mary Singh, " MD  [facility]

## 2018-12-20 ENCOUNTER — OFFICE VISIT (OUTPATIENT)
Dept: FAMILY MEDICINE | Facility: CLINIC | Age: 43
End: 2018-12-20
Payer: COMMERCIAL

## 2018-12-20 VITALS
BODY MASS INDEX: 24.66 KG/M2 | RESPIRATION RATE: 12 BRPM | HEART RATE: 92 BPM | TEMPERATURE: 97.6 F | SYSTOLIC BLOOD PRESSURE: 116 MMHG | WEIGHT: 148 LBS | OXYGEN SATURATION: 97 % | DIASTOLIC BLOOD PRESSURE: 68 MMHG | HEIGHT: 65 IN

## 2018-12-20 DIAGNOSIS — I12.9 RENAL HYPERTENSION: ICD-10-CM

## 2018-12-20 DIAGNOSIS — N18.1 CKD (CHRONIC KIDNEY DISEASE) STAGE 1, GFR 90 ML/MIN OR GREATER: ICD-10-CM

## 2018-12-20 DIAGNOSIS — E78.5 HYPERLIPIDEMIA WITH TARGET LDL LESS THAN 100: ICD-10-CM

## 2018-12-20 LAB
ANION GAP SERPL CALCULATED.3IONS-SCNC: 11 MMOL/L (ref 3–14)
BUN SERPL-MCNC: 10 MG/DL (ref 7–30)
CALCIUM SERPL-MCNC: 8.5 MG/DL (ref 8.5–10.1)
CHLORIDE SERPL-SCNC: 103 MMOL/L (ref 94–109)
CO2 SERPL-SCNC: 22 MMOL/L (ref 20–32)
CREAT SERPL-MCNC: 0.58 MG/DL (ref 0.52–1.04)
GFR SERPL CREATININE-BSD FRML MDRD: >90 ML/MIN/{1.73_M2}
GLUCOSE SERPL-MCNC: 335 MG/DL (ref 70–99)
HBA1C MFR BLD: 11.7 % (ref 0–5.6)
POTASSIUM SERPL-SCNC: 3.7 MMOL/L (ref 3.4–5.3)
SODIUM SERPL-SCNC: 136 MMOL/L (ref 133–144)

## 2018-12-20 PROCEDURE — 80048 BASIC METABOLIC PNL TOTAL CA: CPT | Performed by: FAMILY MEDICINE

## 2018-12-20 PROCEDURE — 99214 OFFICE O/P EST MOD 30 MIN: CPT | Performed by: FAMILY MEDICINE

## 2018-12-20 PROCEDURE — 83036 HEMOGLOBIN GLYCOSYLATED A1C: CPT | Performed by: FAMILY MEDICINE

## 2018-12-20 PROCEDURE — 36415 COLL VENOUS BLD VENIPUNCTURE: CPT | Performed by: FAMILY MEDICINE

## 2018-12-20 RX ORDER — METFORMIN HCL 500 MG
TABLET, EXTENDED RELEASE 24 HR ORAL
Qty: 120 TABLET | Refills: 0 | Status: SHIPPED | OUTPATIENT
Start: 2018-12-20 | End: 2019-01-12

## 2018-12-20 RX ORDER — LISINOPRIL 5 MG/1
2.5 TABLET ORAL DAILY
Qty: 45 TABLET | Refills: 1 | Status: SHIPPED | OUTPATIENT
Start: 2018-12-20 | End: 2019-06-26

## 2018-12-20 RX ORDER — LIRAGLUTIDE 6 MG/ML
1.2 INJECTION SUBCUTANEOUS DAILY
Qty: 6 ML | Refills: 3 | Status: SHIPPED | OUTPATIENT
Start: 2018-12-20 | End: 2021-01-13

## 2018-12-20 ASSESSMENT — MIFFLIN-ST. JEOR: SCORE: 1332.2

## 2018-12-20 NOTE — NURSING NOTE
"Chief Complaint   Patient presents with     Diabetes       Initial /84   Pulse 92   Temp 97.6  F (36.4  C) (Oral)   Resp 12   Ht 1.651 m (5' 5\")   Wt 67.1 kg (148 lb)   SpO2 97%   BMI 24.63 kg/m   Estimated body mass index is 24.63 kg/m  as calculated from the following:    Height as of this encounter: 1.651 m (5' 5\").    Weight as of this encounter: 67.1 kg (148 lb)..  BP completed using cuff size: regular    "

## 2019-01-11 NOTE — TELEPHONE ENCOUNTER
Refill request received within 30 days of last office visit with pcp.  Prescription is routed to the provider to please address refill.    Crystal GODOYN, RN

## 2019-01-12 RX ORDER — METFORMIN HCL 500 MG
TABLET, EXTENDED RELEASE 24 HR ORAL
Qty: 120 TABLET | Refills: 0 | Status: SHIPPED | OUTPATIENT
Start: 2019-01-12 | End: 2021-01-13

## 2019-01-23 RX ORDER — METFORMIN HCL 500 MG
TABLET, EXTENDED RELEASE 24 HR ORAL
Qty: 120 TABLET | Refills: 0 | OUTPATIENT
Start: 2019-01-23

## 2019-02-13 ENCOUNTER — TELEPHONE (OUTPATIENT)
Dept: FAMILY MEDICINE | Facility: CLINIC | Age: 44
End: 2019-02-13

## 2019-02-13 NOTE — TELEPHONE ENCOUNTER
Panel Management Review      Patient has the following on her problem list:     Diabetes    ASA: Passed    Last A1C  Lab Results   Component Value Date    A1C 11.7 12/20/2018    A1C 8.6 04/24/2018    A1C 11.3 02/07/2018    A1C 7.6 10/06/2013    A1C 7.5 05/03/2013     A1C tested: FAILED    Last LDL:    Lab Results   Component Value Date    CHOL 193 02/07/2018     Lab Results   Component Value Date    HDL 35 02/07/2018     Lab Results   Component Value Date     04/24/2018    LDL 84 02/07/2018     Lab Results   Component Value Date    TRIG 369 02/07/2018     Lab Results   Component Value Date    CHOLHDLRATIO 7.9 03/27/2015     Lab Results   Component Value Date    NHDL 158 02/07/2018       Is the patient on a Statin? YES             Is the patient on Aspirin? YES    Medications     HMG CoA Reductase Inhibitors    atorvastatin (LIPITOR) 40 MG tablet    Salicylates    aspirin 81 MG chewable tablet          Last three blood pressure readings:  BP Readings from Last 3 Encounters:   12/20/18 116/68   12/05/18 131/60   05/16/18 122/76       Date of last diabetes office visit: 12/20/18     Tobacco History:     History   Smoking Status     Never Smoker   Smokeless Tobacco     Never Used     Comment: non-smoking household           Composite cancer screening  Chart review shows that this patient is due/due soon for the following None  Summary:    Patient is due/failing the following:   A1C    Action needed:   None recently seen       Type of outreach:    none    Questions for provider review:    None                                                                                                                                    Barbara Mueller MA       Chart routed to Care Team .

## 2019-03-26 ENCOUNTER — TRANSFERRED RECORDS (OUTPATIENT)
Dept: HEALTH INFORMATION MANAGEMENT | Facility: CLINIC | Age: 44
End: 2019-03-26

## 2019-04-26 ENCOUNTER — TELEPHONE (OUTPATIENT)
Dept: OBGYN | Facility: CLINIC | Age: 44
End: 2019-04-26

## 2019-04-26 ENCOUNTER — OFFICE VISIT (OUTPATIENT)
Dept: OBGYN | Facility: CLINIC | Age: 44
End: 2019-04-26
Payer: COMMERCIAL

## 2019-04-26 VITALS
SYSTOLIC BLOOD PRESSURE: 149 MMHG | BODY MASS INDEX: 24.93 KG/M2 | WEIGHT: 149.8 LBS | HEART RATE: 94 BPM | DIASTOLIC BLOOD PRESSURE: 83 MMHG | OXYGEN SATURATION: 100 %

## 2019-04-26 DIAGNOSIS — N93.9 ABNORMAL UTERINE BLEEDING: Primary | ICD-10-CM

## 2019-04-26 DIAGNOSIS — D50.8 OTHER IRON DEFICIENCY ANEMIA: ICD-10-CM

## 2019-04-26 LAB
HGB BLD-MCNC: 9.1 G/DL (ref 11.7–15.7)
TSH SERPL DL<=0.005 MIU/L-ACNC: 2.86 MU/L (ref 0.4–4)

## 2019-04-26 PROCEDURE — 99203 OFFICE O/P NEW LOW 30 MIN: CPT | Performed by: OBSTETRICS & GYNECOLOGY

## 2019-04-26 PROCEDURE — 85018 HEMOGLOBIN: CPT | Performed by: OBSTETRICS & GYNECOLOGY

## 2019-04-26 PROCEDURE — 36415 COLL VENOUS BLD VENIPUNCTURE: CPT | Performed by: OBSTETRICS & GYNECOLOGY

## 2019-04-26 PROCEDURE — 84443 ASSAY THYROID STIM HORMONE: CPT | Performed by: OBSTETRICS & GYNECOLOGY

## 2019-04-26 RX ORDER — NORETHINDRONE ACETATE AND ETHINYL ESTRADIOL .02; 1 MG/1; MG/1
1 TABLET ORAL
COMMUNITY
Start: 2019-03-27 | End: 2024-08-19

## 2019-04-26 NOTE — TELEPHONE ENCOUNTER
Patient called back and has been scheduled with provider today, and Monday's appt with Noelle has been cancelled.     Rema Orozco RN on 4/26/2019 at 10:47 AM

## 2019-04-26 NOTE — PROGRESS NOTES
Jesus Alberto is a 43 year old , who is here today with complaint of abnormal uterine bleeding.    Normally her menses are monthly, but in February she did not have any bleeding.  Then in March, she had 2 weeks of vaginal bleeding.  For the first week of bleeding, she had bleeding similar to her normal menses.  This is a couple of days of heavy bleeding and changing pads 3 to 4 times a day, and then the bleeding is light for the additional 5 days.  For the second week her bleeding then became heavy and she was changing her pads every 2 hours.  She did not have clots.  She began feeling lightheadedness, dizziness and week feeling.  She then went to [facility].  She was noted to be anemic and hgb was 6.9.  She received one unit of blood.  And her hgb upon discharge was 7.8.   She was advised to have the D&C, but she declined.  She started OCPs and she started taking the OCPs twice a day for 3 packs.  She stopped taking the OCPs last week.  She has not had any bleeding this week.      She had the following ultrasound and the patient and I reviewed and discussed the report findings.     US PELVIS COMPLETE TA AND TV[facility]  [facility]  Other Result Information   This result has an attachment that is not available.   Result Narrative   Clinical History: Vaginal bleeding.    Technique: Transabdominal and endovaginal pelvic ultrasound. Endovaginal imaging is utilized for better evaluation of the endometrium.    Comparison: CT of 2018.    Findings: The uterus has normal size, shape and position measuring 10.3 x 6.5 x 8.0 cm. The total endometrial stripe thickness is 1.5 cm. There is a small amount of fluid in the endometrial cavity. There are no uterine fibroids. The bladder is unremarkable.    The right ovary measures 3.9 x 3.6 x 3.4 cm. There is a simple 3.1 cm cyst in the right ovary, and a separate simple 1.8 cm cyst. The left ovary is within normal limits and measures 2.3 x  2.2 x 1.4 cm. No adnexal masses or complex cysts are seen. Flow is demonstrated to each ovary.    There is no free fluid in the cul-de-sac.    Impression:  1. Indeterminant thickening of the endometrium measuring 1.5 cm with a small amount of fluid. Cannot exclude underlying endometrial lesion. Given the history of abnormal bleeding recommend sonohysterogram versus hysteroscopy for further evaluation.  2. Two simple unilocular right ovarian cysts measuring 3.1 cm and 1.8 cm likely representing physiologic follicles.  3. Left ovary is negative.         Past Medical History:   Diagnosis Date     CKD (chronic kidney disease) stage 1, GFR 90 ml/min or greater      DM mellitus, gestational      Hyperlipidemia      Obesity 2012     Renal hypertension 2018     Renal stone      Type 2 diabetes, HbA1c goal < 7% (H) 3/11/2013     Vitamin D deficiency        Past Surgical History:   Procedure Laterality Date     CYSTOSCOPY,URETEROSCOPY,LITHOTRIPSY  2018    left stent placement      HC TOOTH EXTRACTION W/FORCEP         OB History    Para Term  AB Living   9 8 8 0 1 8   SAB TAB Ectopic Multiple Live Births   1 0 0 0 8      # Outcome Date GA Lbr Elliott/2nd Weight Sex Delivery Anes PTL Lv   9 Term 13 38w4d 08:06 / 00:02 4.111 kg (9 lb 1 oz) M Vag-Spont None N BELÉN      Name: MAYANK SAL      Apgar1: 5  Apgar5: 9   8 Term 12/10/10 40w0d  3.629 kg (8 lb) F Vag-Spont         Birth Comments: System Generated. Please review and update pregnancy details.      Name: Elaina   7 Term 06 40w0d  3.629 kg (8 lb) M  None        Birth Comments: gest diabetes      Name: Landen   6 Term 05 40w0d  3.629 kg (8 lb) M  None        Birth Comments: uncomplicated      Name: Reese   5 Term 04 40w0d  3.629 kg (8 lb) F  None        Birth Comments: uncomplicated      Name: Madison   4 Term 99 40w0d  3.629 kg (8 lb) M  None        Birth Comments: uncomplicated      Name: Richard   3 Term  98 39w0d  3.629 kg (8 lb) F  None        Birth Comments: uncomplicated      Name: Yoko Granados Term 94 40w0d  3.345 kg (7 lb 6 oz) F  None        Birth Comments: uncomplicated      Name: Chata Freeman SAB                Gynecological History         Patient's last menstrual period was 2019 (exact date).    No STD/no PID/no IUD      see above HPI       Allergies   Allergen Reactions     Nkda [No Known Drug Allergies]        Current Outpatient Medications   Medication Sig Dispense Refill     aspirin 81 MG chewable tablet Take 1 tablet (81 mg) by mouth daily 108 tablet 3     atorvastatin (LIPITOR) 40 MG tablet Take 1 tablet (40 mg) by mouth daily 90 tablet 3     blood glucose (NO BRAND SPECIFIED) lancets standard Use to test blood sugar 1 times daily or as directed. 100 each 3     blood glucose monitoring (NO BRAND SPECIFIED) test strip Use to test blood sugar 1 times daily or as directed. 100 strip 3     Cholecalciferol (VITAMIN D) 2000 UNITS tablet Take 2,000 Units by mouth       ferrous sulfate (IRON) 325 (65 FE) MG tablet 1 tablet twice daily 180 tablet 1     insulin pen needle (BD ANAT U/F) 32G X 4 MM miscellaneous Use 1 daily as directed. 100 each 11     liraglutide (VICTOZA) 18 MG/3ML solution Inject 1.2 mg Subcutaneous daily 6 mL 3     lisinopril (PRINIVIL/ZESTRIL) 5 MG tablet Take 0.5 tablets (2.5 mg) by mouth daily 45 tablet 1     metFORMIN (GLUCOPHAGE-XR) 500 MG 24 hr tablet TAKE 2 TABLETS (1,000 MG) BY MOUTH 2 TIMES DAILY (WITH MEALS) 120 tablet 0     norethindrone-ethinyl estradiol (MICROGESTIN 20) 1-20 MG-MCG tablet Take 1 tablet by mouth       order for DME Equipment being ordered: glucometer with lancets and strips to check sugars tid 1 each 1       Social History     Socioeconomic History     Marital status:      Spouse name: Edinson     Number of children: 8     Years of education: 12     Highest education level: Not on file   Occupational History     Employer: [employer]   Social  Needs     Financial resource strain: Not on file     Food insecurity:     Worry: Not on file     Inability: Not on file     Transportation needs:     Medical: Not on file     Non-medical: Not on file   Tobacco Use     Smoking status: Never Smoker     Smokeless tobacco: Never Used     Tobacco comment: non-smoking household   Substance and Sexual Activity     Alcohol use: No     Drug use: No     Sexual activity: Yes     Partners: Male     Birth control/protection: Condom   Lifestyle     Physical activity:     Days per week: Not on file     Minutes per session: Not on file     Stress: Not on file   Relationships     Social connections:     Talks on phone: Not on file     Gets together: Not on file     Attends Mosque service: Not on file     Active member of club or organization: Not on file     Attends meetings of clubs or organizations: Not on file     Relationship status: Not on file     Intimate partner violence:     Fear of current or ex partner: Not on file     Emotionally abused: Not on file     Physically abused: Not on file     Forced sexual activity: Not on file   Other Topics Concern     Parent/sibling w/ CABG, MI or angioplasty before 65F 55M? No   Social History Narrative     Not on file       Family History   Problem Relation Age of Onset     Diabetes Mother      Cancer No family hx of      C.A.D. No family hx of      Hypertension No family hx of      Cerebrovascular Disease No family hx of      Thyroid Disease No family hx of      Glaucoma No family hx of      Macular Degeneration No family hx of          Review of Systems:  10 point ROS of systems including Constitutional, Eyes, Respiratory, Cardiovascular, Gastroenterology, Genitourinary, Integumentary, Muscularskeletal, Psychiatric were all negative except for pertinent positives noted in my HPI and in the PMH.          EXAM:  /83 (BP Location: Right arm, Cuff Size: Adult Regular)   Pulse 94   Wt 67.9 kg (149 lb 12.8 oz)   LMP 03/09/2019  (Exact Date)   SpO2 100%   BMI 24.93 kg/m    Body mass index is 24.93 kg/m .  General Appearance:  healthy, alert, active, no distress  Skin:  Normal skin turgor  Neuro:  Alert, cranial nerves grossly intact  HEENT: NCAT  Neck:  No masses or lesions carotids are +2/4. No bruits heard  Lungs:  Good respiratory effort   Pelvic exam:  Not performed today   Extremities:  No clubbing, cyanosis or edema.        ASSESSMENT:  Abnormal uterine bleeding  Anemia  Diabetes       PLAN:  TSH and Hgb are ordered.   We discussed the bleeding profiles as people age and approach menopause.  Even though menstrual changes and irregularities are common and expected, they may also indicate or precipitate endometrial abnormalities.   The patient and I discussed the options for evaluation.  The EMB, SIS and the D&C were reviewed with her.  The EMB will not remove a polyp, but will give a tissue sample.  The SIS will further evaluate the lining, but no tissue sample, and should she have a suspected polyp, it would not be removed.  The D&C is more expensive but is currently the gold standard.   She is ok with the EMB to start.    Together we briefly reviewed the risks and benefits of medical versus surgical therapy.  She did not desire to discuss these much at this time.   She is to return for the EMB and premedicate with Ibuprofen.      TT 30 min  CT and review of records, as noted above in the HPI and in the Plan, greater than 50%    Abundio Ricci MD

## 2019-04-26 NOTE — TELEPHONE ENCOUNTER
Unable to reach patient via either phone number. Left message to call clinic back.     Noelle is stating that patient needs to be seen by a provider for her ED visit and symptoms. She should cancel her appt with her on Monday and be seen with an available provider today or Monday. Will assist in rescheduling her with a provider when she calls back.     Rema Orozco RN on 4/26/2019 at 8:56 AM

## 2019-05-22 ENCOUNTER — TELEPHONE (OUTPATIENT)
Dept: FAMILY MEDICINE | Facility: CLINIC | Age: 44
End: 2019-05-22

## 2019-05-22 NOTE — TELEPHONE ENCOUNTER
Panel Management Review      Patient has the following on her problem list:     Diabetes    ASA: Passed    Last A1C  Lab Results   Component Value Date    A1C 11.7 12/20/2018    A1C 8.6 04/24/2018    A1C 11.3 02/07/2018    A1C 7.6 10/06/2013    A1C 7.5 05/03/2013     A1C tested: FAILED    Last LDL:    Lab Results   Component Value Date    CHOL 193 02/07/2018     Lab Results   Component Value Date    HDL 35 02/07/2018     Lab Results   Component Value Date     04/24/2018    LDL 84 02/07/2018     Lab Results   Component Value Date    TRIG 369 02/07/2018     Lab Results   Component Value Date    CHOLHDLRATIO 7.9 03/27/2015     Lab Results   Component Value Date    NHDL 158 02/07/2018       Is the patient on a Statin? YES             Is the patient on Aspirin? YES    Medications     HMG CoA Reductase Inhibitors     atorvastatin (LIPITOR) 40 MG tablet       Salicylates     aspirin 81 MG chewable tablet             Last three blood pressure readings:  BP Readings from Last 3 Encounters:   04/26/19 149/83   12/20/18 116/68   12/05/18 131/60       Date of last diabetes office visit: 12/20/18     Tobacco History:     History   Smoking Status     Never Smoker   Smokeless Tobacco     Never Used     Comment: non-smoking household           Composite cancer screening  Chart review shows that this patient is due/due soon for the following Pap Smear and Mammogram  Summary:    Patient is due/failing the following:   A1C, LDL, MAMMOGRAM, PAP and PHYSICAL    Action needed:   Patient needs office visit for physical and dm check.    Type of outreach:    Sent ACE message.    Questions for provider review:    None                                                                                                                                    Barbara Mueller MA       Chart routed to Care Team .           Yes

## 2019-05-26 ENCOUNTER — DOCUMENTATION ONLY (OUTPATIENT)
Dept: FAMILY MEDICINE | Facility: CLINIC | Age: 44
End: 2019-05-26

## 2019-05-26 NOTE — PROGRESS NOTES
This patient has overdue labs. A letter was sent on 4/18/2019 and there has been no lab appointment made. If you still want these labs done, please have your care team contact the patient to make a lab appointment. Otherwise, please have the labs discontinued and close the encounter.    Thank you,  [facility]

## 2019-05-30 ENCOUNTER — TELEPHONE (OUTPATIENT)
Dept: FAMILY MEDICINE | Facility: CLINIC | Age: 44
End: 2019-05-30

## 2019-05-30 NOTE — LETTER
May 31, 2019    Jesus Alberto Dneson  [address]  [place] [address]              Carlos Eduardo Denson      Our records indicate that you have not scheduled for a(n)Diabetic check  which was recommended by your health care team. Monitoring and managing your preventative and chronic health conditions are very important to us.       If you have received your health care elsewhere, please provide us with that information so it can be documented in your chart.    Please call [phone] or message us through your [employer] account to schedule an appointment or provide information for your chart.     We look forward to seeing you and working with you on your health care needs.     Sincerely,   Mary Flores MD/klf          *If you have already scheduled an appointment, please disregard this reminder

## 2019-05-30 NOTE — TELEPHONE ENCOUNTER
Response requested.  Pharmay message: We spoke with your patient about diabetes care and noticed your patient previously received statin therapy but has not filled at a [facility] pharmacy in the last  180 days.  Your patient would like us to reach out on their behalf to determine if it is appropriate to restart the statin therapy if it is appropriate.

## 2019-05-31 NOTE — TELEPHONE ENCOUNTER
Pt needs to be seen for diabetes. She is overdue for visit. We can discuss it then. Need to know she cannot get pregnant if prescribing for 43 year old female.  Please call to help her make appt.     Mary Singh MD

## 2019-06-26 NOTE — LETTER
June 27, 2019    Jesus Alberto Denson  [address] [place] [id number]    Dear JesusA lberto,       We recently received a refill request for lisinopril.  We have refilled this for a one time 30 day supply only because you are due for a:    Diabetes office visit and fasting lab appointment      Please schedule this lab appointment 4-5 days prior to the office visit.     Please call at your earliest convenience so that there will not be a delay with your future refills.          Thank you,   [facility] Team/laurita  [phone]

## 2019-06-27 RX ORDER — LISINOPRIL 5 MG/1
TABLET ORAL
Qty: 15 TABLET | Refills: 0 | Status: SHIPPED | OUTPATIENT
Start: 2019-06-27 | End: 2021-01-13

## 2019-08-26 ENCOUNTER — TELEPHONE (OUTPATIENT)
Dept: FAMILY MEDICINE | Facility: CLINIC | Age: 44
End: 2019-08-26

## 2019-08-26 NOTE — TELEPHONE ENCOUNTER
Panel Management Review      Patient has the following on her problem list:     Diabetes    ASA: Passed    Last A1C  Lab Results   Component Value Date    A1C 11.7 12/20/2018    A1C 8.6 04/24/2018    A1C 11.3 02/07/2018    A1C 7.6 10/06/2013    A1C 7.5 05/03/2013     A1C tested: FAILED    Last LDL:    Lab Results   Component Value Date    CHOL 193 02/07/2018     Lab Results   Component Value Date    HDL 35 02/07/2018     Lab Results   Component Value Date     04/24/2018    LDL 84 02/07/2018     Lab Results   Component Value Date    TRIG 369 02/07/2018     Lab Results   Component Value Date    CHOLHDLRATIO 7.9 03/27/2015     Lab Results   Component Value Date    NHDL 158 02/07/2018       Is the patient on a Statin? YES             Is the patient on Aspirin? YES    Medications     HMG CoA Reductase Inhibitors     atorvastatin (LIPITOR) 40 MG tablet       Salicylates     aspirin 81 MG chewable tablet             Last three blood pressure readings:  BP Readings from Last 3 Encounters:   04/26/19 149/83   12/20/18 116/68   12/05/18 131/60       Date of last diabetes office visit: 12/20/18     Tobacco History:     History   Smoking Status     Never Smoker   Smokeless Tobacco     Never Used     Comment: non-smoking household         Hypertension   Last three blood pressure readings:  BP Readings from Last 3 Encounters:   04/26/19 149/83   12/20/18 116/68   12/05/18 131/60     Blood pressure: FAILED    HTN Guidelines:  Less than 140/90      Composite cancer screening  Chart review shows that this patient is due/due soon for the following None  Summary:    Patient is due/failing the following:   A1C and LDL    Action needed:   Patient needs office visit for dm .    Type of outreach:    Sent [employer] message.    Questions for provider review:    None                                                                                                                                    Barbara Mueller MA       Chart routed  to Care Team .

## 2020-02-24 ENCOUNTER — HEALTH MAINTENANCE LETTER (OUTPATIENT)
Age: 45
End: 2020-02-24

## 2020-12-13 ENCOUNTER — HEALTH MAINTENANCE LETTER (OUTPATIENT)
Age: 45
End: 2020-12-13

## 2021-01-04 ENCOUNTER — OFFICE VISIT (OUTPATIENT)
Dept: URGENT CARE | Facility: URGENT CARE | Age: 46
End: 2021-01-04
Payer: COMMERCIAL

## 2021-01-04 ENCOUNTER — NURSE TRIAGE (OUTPATIENT)
Dept: NURSING | Facility: CLINIC | Age: 46
End: 2021-01-04

## 2021-01-04 VITALS
TEMPERATURE: 97.2 F | HEART RATE: 82 BPM | OXYGEN SATURATION: 100 % | DIASTOLIC BLOOD PRESSURE: 75 MMHG | SYSTOLIC BLOOD PRESSURE: 160 MMHG

## 2021-01-04 DIAGNOSIS — L03.011 INFECTION OF NAIL BED OF FINGER OF RIGHT HAND: Primary | ICD-10-CM

## 2021-01-04 PROCEDURE — 99213 OFFICE O/P EST LOW 20 MIN: CPT | Performed by: NURSE PRACTITIONER

## 2021-01-04 RX ORDER — IBUPROFEN 800 MG/1
800 TABLET, FILM COATED ORAL EVERY 8 HOURS PRN
Qty: 15 TABLET | Refills: 0 | Status: SHIPPED | OUTPATIENT
Start: 2021-01-04 | End: 2021-01-09

## 2021-01-04 RX ORDER — CEPHALEXIN 500 MG/1
500 CAPSULE ORAL 4 TIMES DAILY
Qty: 40 CAPSULE | Refills: 0 | Status: SHIPPED | OUTPATIENT
Start: 2021-01-04 | End: 2021-01-14

## 2021-01-05 NOTE — PATIENT INSTRUCTIONS
Patient Education     Infected Ingrown Toenail (Antibiotics, No Excision)   An ingrown toenail occurs when the nail grows sideways into the skin alongside the nail. This can cause pain. It can also lead to an infection with redness, swelling, and sometimes drainage.   The most common cause of an ingrown toenail is trimming your nails wrong. Most people trim the nails too close to the skin and try to round the nail too tightly around the shape of the toe. When you do this, the nail can grow into the skin of your toe. It's safer to trim the nail ending in a straight line rather than a curve.   Other causes include injury or wearing shoes that are too short or tight. This can cause the same problem that happens when trimming your nails. Your genetics can also make this more likely to happen.   The following are the most common symptoms of an ingrown toenail:     Pain    Redness    Swelling    Drainage  If the infection is mild, you may be able to take care of it at home with the following measures:     Frequent warm water soaks    Keeping it clean    Wearing loose, comfortable shoes or sandals  Another method involves using a small piece of cotton or waxed dental floss to gently lift up the corner of the problem nail. Change the cotton or floss frequently, especially if it gets dirty.   If your infection is mild, and the above methods aren t working, or if the infection gets worse, see your healthcare provider. Signs of worsening infection include:     Swelling    Redness    Pus drainage    Increased pain  In some cases, you may need antibiotics along with warm soaks. If after 2 to 3 days of antibiotics the toenail doesn't get better or gets worse, part of the nail may need to be removed to drain the infection. With treatment, it can take 1 to 2 weeks to clear up completely.   Home care  Wound care  For the next 3 days, soak and clean your toe in warm water a few times a day.    Twice a day for the first 3 days,  clean and soak the toe as follows:  1. Soak your foot in a tub of warm water for 5 minutes. Or, hold your toe under a faucet of warm running water for 5 minute  2. Clean any remaining crust away with soap and water using a cotton swab.  3. Put a small amount of antibiotic ointment on the infected area.    Change the dressing or bandage every time you soak or clean it, or whenever it becomes wet or dirty.    If you were prescribed antibiotics, take them as directed until they are all gone.    Wear comfortable shoes with a lot of toe room, or open-toe sandals, while your toe is healing.  Medicines    You can take over-the-counter medicine for pain, unless you were given a different pain medicine to use. Note: Talk with your provider before using these medicines if you have chronic liver or kidney disease, ever had a stomach ulcer or digestive bleeding, or are taking blood-thinner medicines.    If you were given antibiotics, take them until they are used up or your provider tells you to stop, even if the wound looks better. This makes sure that the infection clears up.  Prevention  To prevent ingrown toenails:    Wear shoes that fit well. Don't wear shoes that pinch the toes together.    When you trim your toenails, don't cut them too short. Cut straight across at the top and don t round the edges.    Don t use a sharp object to clean under your nail since this might cause an infection.    If the toenail starts to grow into the skin again, put a small piece of waxed dental floss or cotton under that side of the nail to help it grow out straight.  Follow-up care  Follow up with your healthcare provider, or as advised. If the antibiotic doesn't work, or if the condition happens again, you may need to have part of the nail removed.   When to seek medical advice  Call your healthcare provider right away if any of the following occur:    Increasing redness, pain, or swelling of the toe    Red streaks in the skin leading  away from the wound    Pus or fluid drainage    Fever of 100.4 F (38 C) or higher, or as directed by your provider  Lina last reviewed this educational content on 8/1/2019 2000-2020 [email or web address]. [address] [place] [address], [place] [address]. All rights reserved. This information is not intended as a substitute for professional medical care. Always follow your healthcare professional's instructions.

## 2021-01-05 NOTE — PROGRESS NOTES
SUBJECTIVE:    Jesus Alberto Denson is a 45 year old female who is seen for right hand middle fingernail infection  Finger is red warm swollen surrounding nail     Past Medical History:   Diagnosis Date     Anemia due to blood loss 03/26/2019     CKD (chronic kidney disease) stage 1, GFR 90 ml/min or greater      DM mellitus, gestational      Hyperlipidemia      Obesity 8/13/2012     Renal hypertension 4/24/2018     Renal stone      Type 2 diabetes, HbA1c goal < 7% (H) 3/11/2013     Vitamin D deficiency      Current Outpatient Medications   Medication     cephALEXin (KEFLEX) 500 MG capsule     ibuprofen (ADVIL/MOTRIN) 800 MG tablet     aspirin 81 MG chewable tablet     atorvastatin (LIPITOR) 40 MG tablet     Cholecalciferol (VITAMIN D) 2000 UNITS tablet     ferrous sulfate (IRON) 325 (65 FE) MG tablet     insulin pen needle (BD ANAT U/F) 32G X 4 MM miscellaneous     liraglutide (VICTOZA) 18 MG/3ML solution     lisinopril (PRINIVIL/ZESTRIL) 5 MG tablet     metFORMIN (GLUCOPHAGE-XR) 500 MG 24 hr tablet     norethindrone-ethinyl estradiol (MICROGESTIN 1/20) 1-20 MG-MCG tablet     order for DME     No current facility-administered medications for this visit.         Allergies   Allergen Reactions     Nkda [No Known Drug Allergies]        REVIEW OF SYSTEMS:   ROS: 10 point ROS neg other than the symptoms noted above in the HPI.      BP (!) 160/75   Pulse 82   Temp 97.2  F (36.2  C) (Tympanic)   SpO2 100%     EXAM:  GENERAL APPEARANCE: alert and no distress   GAIT: NORMAL  FINGER: Right hand middle finger erythematous nail bed infection swollen warm tender  NEURO: normal strength and tone, sentation intact, reflexes normal, mentation intact, speech normal, DTR symmetrically normal in upper extremities and DTR symmetrically normal in lower extremities  PSYCH:  mentation appears normal and affect normal/bright    ASSESSMENT/PLAN  (L03.011) Infection of nail bed of finger of right hand  (primary encounter diagnosis)    Plan:  cephALEXin (KEFLEX) 500 MG capsule   ibuprofen (ADVIL/MOTRIN) 800 MG tablet  Warm soaks, ice home treat and monitor symptoms     ETHAN Tovar CNP

## 2021-01-05 NOTE — TELEPHONE ENCOUNTER
Patient has a swollen, and stiff middle finger for the past 3 days w/ pain level 7-8/10.  She had clipped off hangnail.  Patient does not have a fever. Reviewed care advice with caller per RN triage protocol guideline to be seen w/i 24 hrs.  Caller verbalized understanding and plans to be seen at [facility].           Additional Information    Negative: [1] Swollen joint AND [2] fever    Negative: [1] Looks infected (spreading redness, red streak, pus) AND [2] fever    Negative: Patient sounds very sick or weak to the triager    Negative: [1] Looks infected (spreading redness, red streak, pus) AND [2] severe pain with movement    Negative: [1] Looks infected (spreading redness, red streak, pus) AND [2] large red area (more than 2 in or 5 cm, or entire finger)    Negative: [1] SEVERE pain (e.g., excruciating) AND [2] not improved after 2 hours of pain medicine    Negative: Yellow pus under skin around fingernail (cuticle) or pus under fingernail    Looks infected (spreading redness, pus)  (Exception: localized redness and swelling of skin around nail)    Protocols used: FINGER PAIN-A-AH

## 2021-01-13 ENCOUNTER — VIRTUAL VISIT (OUTPATIENT)
Dept: ENDOCRINOLOGY | Facility: CLINIC | Age: 46
End: 2021-01-13
Payer: COMMERCIAL

## 2021-01-13 DIAGNOSIS — E78.5 DYSLIPIDEMIA: Primary | ICD-10-CM

## 2021-01-13 DIAGNOSIS — E78.5 HYPERLIPIDEMIA WITH TARGET LDL LESS THAN 100: ICD-10-CM

## 2021-01-13 DIAGNOSIS — I10 ESSENTIAL HYPERTENSION: ICD-10-CM

## 2021-01-13 DIAGNOSIS — R80.9 PROTEINURIA, UNSPECIFIED TYPE: ICD-10-CM

## 2021-01-13 PROCEDURE — 99203 OFFICE O/P NEW LOW 30 MIN: CPT | Mod: TEL | Performed by: INTERNAL MEDICINE

## 2021-01-13 RX ORDER — LIRAGLUTIDE 6 MG/ML
INJECTION SUBCUTANEOUS
Qty: 6 ML | Refills: 3 | Status: SHIPPED | OUTPATIENT
Start: 2021-01-13 | End: 2023-10-23

## 2021-01-13 RX ORDER — LISINOPRIL 5 MG/1
TABLET ORAL
Qty: 45 TABLET | Refills: 3 | Status: SHIPPED | OUTPATIENT
Start: 2021-01-13 | End: 2024-07-17

## 2021-01-13 RX ORDER — METFORMIN HCL 500 MG
TABLET, EXTENDED RELEASE 24 HR ORAL
Qty: 360 TABLET | Refills: 3 | Status: SHIPPED | OUTPATIENT
Start: 2021-01-13

## 2021-01-13 RX ORDER — ATORVASTATIN CALCIUM 40 MG/1
40 TABLET, FILM COATED ORAL DAILY
Qty: 90 TABLET | Refills: 3 | Status: SHIPPED | OUTPATIENT
Start: 2021-01-13 | End: 2024-07-17

## 2021-01-13 RX ORDER — PEN NEEDLE, DIABETIC 32GX 5/32"
NEEDLE, DISPOSABLE MISCELLANEOUS
Qty: 100 EACH | Refills: 3 | Status: SHIPPED | OUTPATIENT
Start: 2021-01-13 | End: 2024-07-17

## 2021-01-13 NOTE — LETTER
1/13/2021         RE: Jesus Alberto Denson  [address]  [place] [address]        Dear Colleague,    Thank you for referring your patient, Jesus Alberto Denson, to the [facility]. Please see a copy of my visit note below.    Jesus Alberto is a 45 year old who is being evaluated via a billable telephone visit.      What phone number would you like to be contacted at? [phone]  How would you like to obtain your AVS? Nicoleharkacy    CC: DM.     HPI:   Patient presents for management of DM.   Diagnosed ~5 years ago.     She has not taken her atorvastatin and lisinopril in over 1 year.   She let the Rx's run out.   Not taking her metformin or victoza either.  Same situation as above.     She is currently on doxycycline for cellulitis in her right 3rd finger.   Reports it is improving. Still a little red at the end. Seeing a hand specialist soon.     She does not follow any particular diet.     She does have a meter at home but has not checked in months.   Out of test strips.     ROS: 10 point ROS neg other than the symptoms noted above in the HPI.    PMH:   Patient Active Problem List   Diagnosis     Hyperlipidemia with target LDL less than 100     CKD (chronic kidney disease) stage 1, GFR 90 ml/min or greater     Type 2 diabetes, uncontrolled, with renal manifestation (H)     Vitamin D deficiency     Renal hypertension     Endometrial thickening on ultra sound      Meds:  Current Outpatient Medications   Medication     cephALEXin (KEFLEX) 500 MG capsule     Cholecalciferol (VITAMIN D) 2000 UNITS tablet     norethindrone-ethinyl estradiol (MICROGESTIN 1/20) 1-20 MG-MCG tablet     aspirin 81 MG chewable tablet     atorvastatin (LIPITOR) 40 MG tablet     ferrous sulfate (IRON) 325 (65 FE) MG tablet     insulin pen needle (BD ANAT U/F) 32G X 4 MM miscellaneous     liraglutide (VICTOZA) 18 MG/3ML solution     lisinopril (PRINIVIL/ZESTRIL) 5 MG tablet     metFORMIN (GLUCOPHAGE-XR) 500 MG 24 hr tablet      order for DME     No current facility-administered medications for this visit.       FHX:   Mother has DM.     SHX:  Non-smoker.     Exam:   Gen: In NAD.     A/P:   Type 2 DM - Uncontrolled. No recent HbA1C but serum glucose of 566 last week. No medications for over 1 year. Long discussion regarding the dangers of uncontrolled DM.   -Resume metformin and victoza.   -Testing supplies refilled.   -Low threshold to start SLGT-2i if glucoses not improving after resumption of above.   -ASA optional.   -BP: elevated on last check. Off her lisinopril.   -NAFL/SILVERMAN: screening due  -Lipids: history of high LDL. Currently off her atorvastatin.    Screening due.   -Microalbumin: history of proteinuria. Currently off her lisinopril.    Lab due.     Resume lisinopril.  -Eyes: Background NPDR in 3/2018. Due for exam.   -Smoking: none.      HTN - management as above.     Dyslipidemia - management as above.     Proteinuria - management as above.     Due to the COVID 19 pandemic this visit was a telephone/video visit in order to help prevent spread of infection in this high risk patient and the general population. The patient gave verbal consent for the visit today.    I have independently reviewed and interpreted labs, imaging as indicated.     Visit Start time 1559  Visit Stop time 1624  Total time  25  This would have been billed as [id number] if it was an in person visit.     Jimy Grijalva MD on 1/14/2021 at 7:15 AM          Again, thank you for allowing me to participate in the care of your patient.        Sincerely,        Jimy Grijalva MD

## 2021-01-13 NOTE — PROGRESS NOTES
Jesus Alberto is a 45 year old who is being evaluated via a billable telephone visit.      What phone number would you like to be contacted at? [phone]  How would you like to obtain your AVS? Patria    CC: DM.     HPI:   Patient presents for management of DM.   Diagnosed ~5 years ago.     She has not taken her atorvastatin and lisinopril in over 1 year.   She let the Rx's run out.   Not taking her metformin or victoza either.  Same situation as above.     She is currently on doxycycline for cellulitis in her right 3rd finger.   Reports it is improving. Still a little red at the end. Seeing a hand specialist soon.     She does not follow any particular diet.     She does have a meter at home but has not checked in months.   Out of test strips.     ROS: 10 point ROS neg other than the symptoms noted above in the HPI.    PMH:   Patient Active Problem List   Diagnosis     Hyperlipidemia with target LDL less than 100     CKD (chronic kidney disease) stage 1, GFR 90 ml/min or greater     Type 2 diabetes, uncontrolled, with renal manifestation (H)     Vitamin D deficiency     Renal hypertension     Endometrial thickening on ultra sound      Meds:  Current Outpatient Medications   Medication     cephALEXin (KEFLEX) 500 MG capsule     Cholecalciferol (VITAMIN D) 2000 UNITS tablet     norethindrone-ethinyl estradiol (MICROGESTIN 1/20) 1-20 MG-MCG tablet     aspirin 81 MG chewable tablet     atorvastatin (LIPITOR) 40 MG tablet     ferrous sulfate (IRON) 325 (65 FE) MG tablet     insulin pen needle (BD ANAT U/F) 32G X 4 MM miscellaneous     liraglutide (VICTOZA) 18 MG/3ML solution     lisinopril (PRINIVIL/ZESTRIL) 5 MG tablet     metFORMIN (GLUCOPHAGE-XR) 500 MG 24 hr tablet     order for DME     No current facility-administered medications for this visit.       FHX:   Mother has DM.     SHX:  Non-smoker.     Exam:   Gen: In NAD.     A/P:   Type 2 DM - Uncontrolled. No recent HbA1C but serum glucose of 566 last week. No  medications for over 1 year. Long discussion regarding the dangers of uncontrolled DM.   -Resume metformin and victoza.   -Testing supplies refilled.   -Low threshold to start SLGT-2i if glucoses not improving after resumption of above.   -ASA optional.   -BP: elevated on last check. Off her lisinopril.   -NAFL/SILVERMAN: screening due  -Lipids: history of high LDL. Currently off her atorvastatin.    Screening due.   -Microalbumin: history of proteinuria. Currently off her lisinopril.    Lab due.     Resume lisinopril.  -Eyes: Background NPDR in 3/2018. Due for exam.   -Smoking: none.      HTN - management as above.     Dyslipidemia - management as above.     Proteinuria - management as above.     Due to the COVID 19 pandemic this visit was a telephone/video visit in order to help prevent spread of infection in this high risk patient and the general population. The patient gave verbal consent for the visit today.    I have independently reviewed and interpreted labs, imaging as indicated.     Visit Start time 1559  Visit Stop time 1624  Total time  25  This would have been billed as [id number] if it was an in person visit.     Jimy Griajlva MD on 1/14/2021 at 7:15 AM

## 2021-01-26 LAB
ALT SERPL W P-5'-P-CCNC: 21 U/L (ref 0–50)
ANION GAP SERPL CALCULATED.3IONS-SCNC: 6 MMOL/L (ref 3–14)
AST SERPL W P-5'-P-CCNC: 13 U/L (ref 0–45)
BUN SERPL-MCNC: 16 MG/DL (ref 7–30)
CALCIUM SERPL-MCNC: 8.7 MG/DL (ref 8.5–10.1)
CHLORIDE SERPL-SCNC: 98 MMOL/L (ref 94–109)
CHOLEST SERPL-MCNC: 188 MG/DL
CO2 SERPL-SCNC: 26 MMOL/L (ref 20–32)
CREAT SERPL-MCNC: 0.6 MG/DL (ref 0.52–1.04)
CREAT UR-MCNC: 16 MG/DL
GFR SERPL CREATININE-BSD FRML MDRD: >90 ML/MIN/{1.73_M2}
GLUCOSE SERPL-MCNC: 278 MG/DL (ref 70–99)
HBA1C MFR BLD: 12 % (ref 0–5.6)
HDLC SERPL-MCNC: 31 MG/DL
LDLC SERPL CALC-MCNC: ABNORMAL MG/DL
LDLC SERPL DIRECT ASSAY-MCNC: 68 MG/DL
MICROALBUMIN UR-MCNC: 59 MG/L
MICROALBUMIN/CREAT UR: 364.42 MG/G CR (ref 0–25)
NONHDLC SERPL-MCNC: 157 MG/DL
POTASSIUM SERPL-SCNC: 3.9 MMOL/L (ref 3.4–5.3)
SODIUM SERPL-SCNC: 130 MMOL/L (ref 133–144)
TRIGL SERPL-MCNC: 721 MG/DL

## 2021-01-26 PROCEDURE — 84460 ALANINE AMINO (ALT) (SGPT): CPT | Performed by: INTERNAL MEDICINE

## 2021-01-26 PROCEDURE — 83721 ASSAY OF BLOOD LIPOPROTEIN: CPT | Mod: 59 | Performed by: INTERNAL MEDICINE

## 2021-01-26 PROCEDURE — 83036 HEMOGLOBIN GLYCOSYLATED A1C: CPT | Performed by: INTERNAL MEDICINE

## 2021-01-26 PROCEDURE — 80061 LIPID PANEL: CPT | Performed by: INTERNAL MEDICINE

## 2021-01-26 PROCEDURE — 80048 BASIC METABOLIC PNL TOTAL CA: CPT | Performed by: INTERNAL MEDICINE

## 2021-01-26 PROCEDURE — 36415 COLL VENOUS BLD VENIPUNCTURE: CPT | Performed by: INTERNAL MEDICINE

## 2021-01-26 PROCEDURE — 84450 TRANSFERASE (AST) (SGOT): CPT | Performed by: INTERNAL MEDICINE

## 2021-01-26 PROCEDURE — 82043 UR ALBUMIN QUANTITATIVE: CPT | Performed by: INTERNAL MEDICINE

## 2021-02-16 ENCOUNTER — OFFICE VISIT (OUTPATIENT)
Dept: OPTOMETRY | Facility: CLINIC | Age: 46
End: 2021-02-16
Payer: COMMERCIAL

## 2021-02-16 DIAGNOSIS — H52.223 REGULAR ASTIGMATISM OF BOTH EYES: ICD-10-CM

## 2021-02-16 DIAGNOSIS — H52.13 MYOPIA OF BOTH EYES: ICD-10-CM

## 2021-02-16 DIAGNOSIS — H52.4 PRESBYOPIA: ICD-10-CM

## 2021-02-16 DIAGNOSIS — E11.3293 TYPE 2 DIABETES MELLITUS WITH BOTH EYES AFFECTED BY MILD NONPROLIFERATIVE RETINOPATHY WITHOUT MACULAR EDEMA, WITHOUT LONG-TERM CURRENT USE OF INSULIN (H): Primary | ICD-10-CM

## 2021-02-16 PROCEDURE — 92004 COMPRE OPH EXAM NEW PT 1/>: CPT | Performed by: OPTOMETRIST

## 2021-02-16 PROCEDURE — 92015 DETERMINE REFRACTIVE STATE: CPT | Performed by: OPTOMETRIST

## 2021-02-16 ASSESSMENT — REFRACTION_MANIFEST
METHOD_AUTOREFRACTION: 1
OS_SPHERE: -1.75
OD_SPHERE: -4.00
OS_ADD: +1.50
OS_CYLINDER: +0.75
OD_ADD: +1.50
OS_SPHERE: -2.25
OS_CYLINDER: +1.75
OD_CYLINDER: +3.00
OS_AXIS: 100
OD_CYLINDER: +3.00
OD_AXIS: 092
OD_SPHERE: -4.50
OS_AXIS: 074
OD_AXIS: 092

## 2021-02-16 ASSESSMENT — KERATOMETRY
OS_K2POWER_DIOPTERS: 47.50
OS_AXISANGLE2_DEGREES: 178
OS_K1POWER_DIOPTERS: 45.50
OD_K2POWER_DIOPTERS: 47.75
OD_AXISANGLE2_DEGREES: 170
OD_K1POWER_DIOPTERS: 45.75

## 2021-02-16 ASSESSMENT — CUP TO DISC RATIO
OD_RATIO: 0.25
OS_RATIO: 0.25

## 2021-02-16 ASSESSMENT — VISUAL ACUITY
OD_PH_SC: 20/30
OD_SC: 20/50
OS_SC: 20/50
OD_SC: 20/60
METHOD: SNELLEN - LINEAR
OS_SC+: -1
OS_PH_SC+: -3
OD_PH_SC+: -3
OD_SC+: -2
OS_SC: 20/50
OS_PH_SC: 20/30

## 2021-02-16 ASSESSMENT — TONOMETRY
IOP_METHOD: APPLANATION
OS_IOP_MMHG: 12
OD_IOP_MMHG: 12

## 2021-02-16 ASSESSMENT — SLIT LAMP EXAM - LIDS
COMMENTS: NORMAL
COMMENTS: NORMAL

## 2021-02-16 ASSESSMENT — CONF VISUAL FIELD
OS_NORMAL: 1
OD_NORMAL: 1
METHOD: COUNTING FINGERS

## 2021-02-16 NOTE — PROGRESS NOTES
Chief Complaint   Patient presents with     Diabetic Eye Exam     Annual Diabetic Eye Exam, Last one not at [facility]      Dm2  No insulin      Hemoglobin A1C   Date Value Ref Range Status   01/26/2021 12.0 (H) 0 - 5.6 % Final     Comment:     Results confirmed by repeat test  Normal <5.7% Prediabetes 5.7-6.4%  Diabetes 6.5% or higher - adopted from ADA   consensus guidelines.     12/20/2018 11.7 (H) 0 - 5.6 % Final     Comment:     Normal <5.7% Prediabetes 5.7-6.4%  Diabetes 6.5% or higher - adopted from ADA   consensus guidelines.     04/24/2018 8.6 (H) 0 - 5.6 % Final     Comment:     Normal <5.7% Prediabetes 5.7-6.4%  Diabetes 6.5% or higher - adopted from ADA   consensus guidelines.     03/27/2015 10.8 (A) 4.3 - 6 % Final   12/19/2014 12.4 (A) 4.3 - 6 % Final   09/26/2014 10.2 (A) 4.3 - 6 % Final     Tried lined bifocal - made her feel dizzy in  2018     Last Eye Exam: 1 year ago, not at a [facility]   Dilated Previously: Yes    What are you currently using to see?  Glasses, she did not bring them to this appointment     Distance Vision Acuity: Satisfied with vision, states that she doesn't really wear the glasses to drive, maybe at night as needed. She said that they make her a little dizzy     Near Vision Acuity: Satisfied with vision while reading and using computer unaided, sometimes things get blurry after awhile     Eye Comfort: good  Do you use eye drops? : No  Occupation or Hobbies: Stay at home     Ekta [employer] Optometric Assistant      Medical, surgical and family histories reviewed and updated 2/16/2021.       OBJECTIVE: See Ophthalmology exam    ASSESSMENT:    ICD-10-CM    1. Type 2 diabetes mellitus with both eyes affected by mild nonproliferative retinopathy without macular edema, without long-term current use of insulin (H)  E11.3293 EYE EXAM (SIMPLE-NONBILLABLE)     REFRACTION   2. Myopia of both eyes  H52.13 EYE EXAM (SIMPLE-NONBILLABLE)     REFRACTION   3. Regular astigmatism of both eyes  H52.223  EYE EXAM (SIMPLE-NONBILLABLE)     REFRACTION   4. Presbyopia  H52.4 EYE EXAM (SIMPLE-NONBILLABLE)     REFRACTION      PLAN:    Jesus Alberto Denson aware  eye exam results will be sent to Edilia Xie.  Patient Instructions   Patient was advised of today's exam findings.  Fill glasses prescription  Allow 2 weeks to adapt to change in glasses  Return to clinic to verify glasses and for glasses check as needed.     Use over the counter artificial tears 2 times a day as needed  (Soothe XP , Systane Ultra or Refresh Optive)    Mild diabetic retinopathy.  Work on improving blood sugar control  Return in 1 year for diabetic eye exam      Diabetes weakens the blood vessels all over the body, including the eyes. Damage to the blood vessels in the eyes can cause swelling or bleeding into part of the eye (called the retina). This is called diabetic retinopathy (CHRISTIANNE-tin-AH-pu-thee). If not treated, this disease can cause vision loss or blindness.   Symptoms may include blurred or distorted vision, but many people have no symptoms. It's important to see your eye doctor regularly to check for problems.   Early treatment and good control can help protect your vision. Here are the things you can do to help prevent vision loss:      1. Keep your blood sugar levels under tight control.      2. Bring high blood pressure under control.      3. No smoking.      4. Have yearly dilated eye exams.      Karolyn Hurd O.D.  [facility]   [address] [place] [address] [phone]

## 2021-02-16 NOTE — LETTER
2/16/2021         RE: Jesus Alberto Denson  [address]   [place] [address]        Dear Colleague,    Thank you for referring your patient, Jesus Alberto Denson, to the [facility]. Mild non proliferative diabetic retinopathy was found in both eyes.  Discussed importance of working on improving blood sugar control to protect vision.  Yearly diabetic eye exam advised.           Again, thank you for allowing me to participate in the care of your patient.        Sincerely,        Karolyn Hurd, OD

## 2021-04-17 ENCOUNTER — HEALTH MAINTENANCE LETTER (OUTPATIENT)
Age: 46
End: 2021-04-17

## 2021-05-14 ENCOUNTER — AMBULATORY - HEALTHEAST (OUTPATIENT)
Dept: NURSING | Facility: CLINIC | Age: 46
End: 2021-05-14

## 2021-06-04 ENCOUNTER — AMBULATORY - HEALTHEAST (OUTPATIENT)
Dept: NURSING | Facility: CLINIC | Age: 46
End: 2021-06-04

## 2021-06-06 ENCOUNTER — HEALTH MAINTENANCE LETTER (OUTPATIENT)
Age: 46
End: 2021-06-06

## 2021-08-11 ENCOUNTER — VIRTUAL VISIT (OUTPATIENT)
Dept: ENDOCRINOLOGY | Facility: CLINIC | Age: 46
End: 2021-08-11
Payer: COMMERCIAL

## 2021-08-11 DIAGNOSIS — I10 ESSENTIAL HYPERTENSION: ICD-10-CM

## 2021-08-11 DIAGNOSIS — E78.5 DYSLIPIDEMIA: ICD-10-CM

## 2021-08-11 DIAGNOSIS — R80.9 PROTEINURIA, UNSPECIFIED TYPE: ICD-10-CM

## 2021-08-11 PROCEDURE — 99214 OFFICE O/P EST MOD 30 MIN: CPT | Mod: GT | Performed by: INTERNAL MEDICINE

## 2021-08-11 NOTE — LETTER
8/11/2021         RE: Jesus Alberto Denson  [address]  [place] [address]        Dear Colleague,    Thank you for referring your patient, Jesus Alberto Denson, to the [facility]. Please see a copy of my visit note below.    Jesus Alberto is a 46 year old who is being evaluated via a billable video visit.      How would you like to obtain your AVS? MyChart  If the video visit is dropped, the invitation should be resent by: Text to cell phone: [phone]  Will anyone else be joining your video visit? No      Video Start Time: 8:40 AM    S:   Patient presents for management of DM.   Diagnosed ~5 years ago.     She has not taken her atorvastatin and lisinopril in over 1 year.   She let the Rx's run out.   Not taking her metformin or victoza either.  Same situation as above.     She is currently on doxycycline for cellulitis in her right 3rd finger.   Reports it is improving. Still a little red at the end. Seeing a hand specialist soon.     She does not follow any particular diet.     She does have a meter at home but has not checked in months.   Out of test strips.     Returns in 8/2021, back on metformin and victoza.   Notes for the last month, she feels dizzy after taking her medications.   Resolves in a few minutes with resting. She does not need to take in food to stop the problem.     Metformin XR 1000 mg BID  victoza 1.8 mg every day    Checking glucose 2/day.   AM - 200  2 hours post breakfast - 300's    No hypoglycemia.     No recent changes in weight.     ROS: 10 point ROS neg other than the symptoms noted above in the HPI.    Exam:   GENERAL: Healthy, alert and no distress  EYES: Eyes grossly normal to inspection.  No discharge or erythema, or obvious scleral/conjunctival abnormalities.  RESP: No audible wheeze, cough, or visible cyanosis.  No visible retractions or increased work of breathing.    SKIN: Visible skin clear. No significant rash, abnormal pigmentation or lesions.  NEURO:  Cranial nerves grossly intact.  Mentation and speech appropriate for age.  PSYCH: Mentation appears normal, affect normal/bright, judgement and insight intact, normal speech and appearance well-groomed.     A/P:   Type 2 DM - Uncontrolled. No recent HbA1C but serum glucose of 566 last week. No medications for over 1 year. Long discussion regarding the dangers of uncontrolled DM.   In 8/2021, back on metformin and victoza. Self described glucoses are elevated. Discussed SGLT-2i's.   -Schedule labs.   -Schedule nurse visit to check blood pressure.   -Continue all current medications.   -Add jardiance 10 mg every day to help with glucose control.   -ASA optional.   -BP: elevated on last check. RN check.   -NAFL/SILVERMAN: normal ALT and AST in 1/2021.   -Lipids: history of high LDL.   LDL 64 in 1/2021.   Back on lisinopril in 8/2021.   -Microalbumin: history of proteinuria.    364 in 1/2021.   Back on lisinopril in 8/2021.  -Eyes: Background NPDR in 3/2018.   Mild NPDR in 2/2021.    Urged DM control as above.   -Smoking: none.      HTN - management as above.     Dyslipidemia - management as above.     Proteinuria - management as above.       Video-Visit Details    Type of service:  Video Visit    Video End Time:8:56 AM    Originating Location (pt. Location): Home    Distant Location (provider location):  [facility]     Platform used for Video Visit: Zohreh Grijalva MD on 8/11/2021 at 8:56 AM        Again, thank you for allowing me to participate in the care of your patient.        Sincerely,        Jimy Grijalva MD

## 2021-08-11 NOTE — PROGRESS NOTES
Jesus Alberto is a 46 year old who is being evaluated via a billable video visit.      How would you like to obtain your AVS? MyChart  If the video visit is dropped, the invitation should be resent by: Text to cell phone: [phone]  Will anyone else be joining your video visit? No      Video Start Time: 8:40 AM    S:   Patient presents for management of DM.   Diagnosed ~5 years ago.     She has not taken her atorvastatin and lisinopril in over 1 year.   She let the Rx's run out.   Not taking her metformin or victoza either.  Same situation as above.     She is currently on doxycycline for cellulitis in her right 3rd finger.   Reports it is improving. Still a little red at the end. Seeing a hand specialist soon.     She does not follow any particular diet.     She does have a meter at home but has not checked in months.   Out of test strips.     Returns in 8/2021, back on metformin and victoza.   Notes for the last month, she feels dizzy after taking her medications.   Resolves in a few minutes with resting. She does not need to take in food to stop the problem.     Metformin XR 1000 mg BID  victoza 1.8 mg every day    Checking glucose 2/day.   AM - 200  2 hours post breakfast - 300's    No hypoglycemia.     No recent changes in weight.     ROS: 10 point ROS neg other than the symptoms noted above in the HPI.    Exam:   GENERAL: Healthy, alert and no distress  EYES: Eyes grossly normal to inspection.  No discharge or erythema, or obvious scleral/conjunctival abnormalities.  RESP: No audible wheeze, cough, or visible cyanosis.  No visible retractions or increased work of breathing.    SKIN: Visible skin clear. No significant rash, abnormal pigmentation or lesions.  NEURO: Cranial nerves grossly intact.  Mentation and speech appropriate for age.  PSYCH: Mentation appears normal, affect normal/bright, judgement and insight intact, normal speech and appearance well-groomed.     A/P:   Type 2 DM - Uncontrolled. No recent  HbA1C but serum glucose of 566 last week. No medications for over 1 year. Long discussion regarding the dangers of uncontrolled DM.   In 8/2021, back on metformin and victoza. Self described glucoses are elevated. Discussed SGLT-2i's.   -Schedule labs.   -Schedule nurse visit to check blood pressure.   -Continue all current medications.   -Add jardiance 10 mg every day to help with glucose control.   -ASA optional.   -BP: elevated on last check. RN check.   -NAFL/SILVERMAN: normal ALT and AST in 1/2021.   -Lipids: history of high LDL.   LDL 64 in 1/2021.   Back on lisinopril in 8/2021.   -Microalbumin: history of proteinuria.    364 in 1/2021.   Back on lisinopril in 8/2021.  -Eyes: Background NPDR in 3/2018.   Mild NPDR in 2/2021.    Urged DM control as above.   -Smoking: none.      HTN - management as above.     Dyslipidemia - management as above.     Proteinuria - management as above.       Video-Visit Details    Type of service:  Video Visit    Video End Time:8:56 AM    Originating Location (pt. Location): Home    Distant Location (provider location):  [facility]     Platform used for Video Visit: Zohreh Grijalva MD on 8/11/2021 at 8:56 AM

## 2021-08-24 ENCOUNTER — ALLIED HEALTH/NURSE VISIT (OUTPATIENT)
Dept: FAMILY MEDICINE | Facility: CLINIC | Age: 46
End: 2021-08-24
Payer: COMMERCIAL

## 2021-08-24 ENCOUNTER — LAB (OUTPATIENT)
Dept: LAB | Facility: CLINIC | Age: 46
End: 2021-08-24

## 2021-08-24 VITALS — SYSTOLIC BLOOD PRESSURE: 132 MMHG | DIASTOLIC BLOOD PRESSURE: 74 MMHG

## 2021-08-24 DIAGNOSIS — I12.9 RENAL HYPERTENSION: Primary | ICD-10-CM

## 2021-08-24 LAB
ANION GAP SERPL CALCULATED.3IONS-SCNC: 6 MMOL/L (ref 3–14)
BUN SERPL-MCNC: 14 MG/DL (ref 7–30)
CALCIUM SERPL-MCNC: 8.4 MG/DL (ref 8.5–10.1)
CHLORIDE BLD-SCNC: 104 MMOL/L (ref 94–109)
CHOLEST SERPL-MCNC: 151 MG/DL
CO2 SERPL-SCNC: 25 MMOL/L (ref 20–32)
CREAT SERPL-MCNC: 0.57 MG/DL (ref 0.52–1.04)
CREAT UR-MCNC: 73 MG/DL
FASTING STATUS PATIENT QL REPORTED: YES
GFR SERPL CREATININE-BSD FRML MDRD: >90 ML/MIN/1.73M2
GLUCOSE BLD-MCNC: 296 MG/DL (ref 70–99)
HBA1C MFR BLD: 11.4 % (ref 0–5.6)
HDLC SERPL-MCNC: 33 MG/DL
LDLC SERPL CALC-MCNC: 63 MG/DL
MICROALBUMIN UR-MCNC: 513 MG/L
MICROALBUMIN/CREAT UR: 702.74 MG/G CR (ref 0–25)
NONHDLC SERPL-MCNC: 118 MG/DL
POTASSIUM BLD-SCNC: 4.1 MMOL/L (ref 3.4–5.3)
SODIUM SERPL-SCNC: 135 MMOL/L (ref 133–144)
TRIGL SERPL-MCNC: 274 MG/DL

## 2021-08-24 PROCEDURE — 99207 PR NO CHARGE NURSE ONLY: CPT

## 2021-08-24 PROCEDURE — 80061 LIPID PANEL: CPT

## 2021-08-24 PROCEDURE — 36415 COLL VENOUS BLD VENIPUNCTURE: CPT

## 2021-08-24 PROCEDURE — 82043 UR ALBUMIN QUANTITATIVE: CPT

## 2021-08-24 PROCEDURE — 83036 HEMOGLOBIN GLYCOSYLATED A1C: CPT

## 2021-08-24 PROCEDURE — 80048 BASIC METABOLIC PNL TOTAL CA: CPT

## 2021-08-24 NOTE — PROGRESS NOTES
Jesus Alberto Denson is a 46 year old patient who comes in today for a Blood Pressure check.  Initial BP:  /74      Data Unavailable  Disposition: results routed to provider      Evelyn SANCHEZ CMA ([facility])

## 2021-09-14 ENCOUNTER — TELEPHONE (OUTPATIENT)
Dept: ENDOCRINOLOGY | Facility: CLINIC | Age: 46
End: 2021-09-14

## 2021-09-14 NOTE — TELEPHONE ENCOUNTER
Patient dropped off DMV form. Form completed by provider and faxed back to DMV. Copy mailed to patient and copy sent to scanning.     Yoko HERNANDEZ MA

## 2022-01-16 ENCOUNTER — HEALTH MAINTENANCE LETTER (OUTPATIENT)
Age: 47
End: 2022-01-16

## 2022-05-08 ENCOUNTER — HEALTH MAINTENANCE LETTER (OUTPATIENT)
Age: 47
End: 2022-05-08

## 2022-08-28 ENCOUNTER — HEALTH MAINTENANCE LETTER (OUTPATIENT)
Age: 47
End: 2022-08-28

## 2023-04-23 ENCOUNTER — HEALTH MAINTENANCE LETTER (OUTPATIENT)
Age: 48
End: 2023-04-23

## 2023-07-16 ENCOUNTER — HEALTH MAINTENANCE LETTER (OUTPATIENT)
Age: 48
End: 2023-07-16

## 2023-09-24 ENCOUNTER — HEALTH MAINTENANCE LETTER (OUTPATIENT)
Age: 48
End: 2023-09-24

## 2024-02-11 ENCOUNTER — HEALTH MAINTENANCE LETTER (OUTPATIENT)
Age: 49
End: 2024-02-11

## 2024-06-30 ENCOUNTER — HEALTH MAINTENANCE LETTER (OUTPATIENT)
Age: 49
End: 2024-06-30

## 2024-07-08 ENCOUNTER — TRANSFERRED RECORDS (OUTPATIENT)
Dept: MULTI SPECIALTY CLINIC | Facility: CLINIC | Age: 49
End: 2024-07-08

## 2024-07-08 LAB — RETINOPATHY: NORMAL

## 2024-07-17 ENCOUNTER — OFFICE VISIT (OUTPATIENT)
Dept: FAMILY MEDICINE | Facility: CLINIC | Age: 49
End: 2024-07-17
Payer: COMMERCIAL

## 2024-07-17 ENCOUNTER — TELEPHONE (OUTPATIENT)
Dept: FAMILY MEDICINE | Facility: CLINIC | Age: 49
End: 2024-07-17

## 2024-07-17 VITALS
OXYGEN SATURATION: 99 % | WEIGHT: 133.2 LBS | SYSTOLIC BLOOD PRESSURE: 160 MMHG | RESPIRATION RATE: 16 BRPM | HEART RATE: 101 BPM | TEMPERATURE: 98.2 F | BODY MASS INDEX: 25.15 KG/M2 | DIASTOLIC BLOOD PRESSURE: 78 MMHG | HEIGHT: 61 IN

## 2024-07-17 DIAGNOSIS — E78.5 HYPERLIPIDEMIA WITH TARGET LDL LESS THAN 100: ICD-10-CM

## 2024-07-17 DIAGNOSIS — E11.40 TYPE 2 DIABETES MELLITUS WITH DIABETIC NEUROPATHY, WITHOUT LONG-TERM CURRENT USE OF INSULIN (H): Primary | ICD-10-CM

## 2024-07-17 DIAGNOSIS — I12.9 RENAL HYPERTENSION: ICD-10-CM

## 2024-07-17 DIAGNOSIS — N18.1 CKD (CHRONIC KIDNEY DISEASE) STAGE 1, GFR 90 ML/MIN OR GREATER: ICD-10-CM

## 2024-07-17 LAB
ALBUMIN SERPL BCG-MCNC: 3.5 G/DL (ref 3.5–5.2)
ALP SERPL-CCNC: 170 U/L (ref 40–150)
ALT SERPL W P-5'-P-CCNC: 33 U/L (ref 0–50)
ANION GAP SERPL CALCULATED.3IONS-SCNC: 12 MMOL/L (ref 7–15)
AST SERPL W P-5'-P-CCNC: 20 U/L (ref 0–45)
BILIRUB SERPL-MCNC: 0.4 MG/DL
BUN SERPL-MCNC: 17.5 MG/DL (ref 6–20)
CALCIUM SERPL-MCNC: 9.1 MG/DL (ref 8.8–10.4)
CHLORIDE SERPL-SCNC: 92 MMOL/L (ref 98–107)
CHOLEST SERPL-MCNC: 303 MG/DL
CREAT SERPL-MCNC: 0.49 MG/DL (ref 0.51–0.95)
CREAT UR-MCNC: 13.8 MG/DL
EGFRCR SERPLBLD CKD-EPI 2021: >90 ML/MIN/1.73M2
FASTING STATUS PATIENT QL REPORTED: YES
FASTING STATUS PATIENT QL REPORTED: YES
GLUCOSE SERPL-MCNC: 632 MG/DL (ref 70–99)
HBA1C MFR BLD: >15 % (ref 0–5.6)
HCO3 SERPL-SCNC: 24 MMOL/L (ref 22–29)
HDLC SERPL-MCNC: 29 MG/DL
LDLC SERPL CALC-MCNC: ABNORMAL MG/DL
LDLC SERPL DIRECT ASSAY-MCNC: 104 MG/DL
MICROALBUMIN UR-MCNC: 711 MG/L
MICROALBUMIN/CREAT UR: 5152.17 MG/G CR (ref 0–25)
NONHDLC SERPL-MCNC: 274 MG/DL
POTASSIUM SERPL-SCNC: 4.1 MMOL/L (ref 3.4–5.3)
PROT SERPL-MCNC: 6.8 G/DL (ref 6.4–8.3)
SODIUM SERPL-SCNC: 128 MMOL/L (ref 135–145)
TRIGL SERPL-MCNC: 1067 MG/DL

## 2024-07-17 PROCEDURE — 82043 UR ALBUMIN QUANTITATIVE: CPT | Performed by: PHYSICIAN ASSISTANT

## 2024-07-17 PROCEDURE — 83721 ASSAY OF BLOOD LIPOPROTEIN: CPT | Mod: 59 | Performed by: PHYSICIAN ASSISTANT

## 2024-07-17 PROCEDURE — 99204 OFFICE O/P NEW MOD 45 MIN: CPT | Performed by: PHYSICIAN ASSISTANT

## 2024-07-17 PROCEDURE — 36415 COLL VENOUS BLD VENIPUNCTURE: CPT | Performed by: PHYSICIAN ASSISTANT

## 2024-07-17 PROCEDURE — 80061 LIPID PANEL: CPT | Performed by: PHYSICIAN ASSISTANT

## 2024-07-17 PROCEDURE — 83036 HEMOGLOBIN GLYCOSYLATED A1C: CPT | Performed by: PHYSICIAN ASSISTANT

## 2024-07-17 PROCEDURE — 82570 ASSAY OF URINE CREATININE: CPT | Performed by: PHYSICIAN ASSISTANT

## 2024-07-17 PROCEDURE — 99207 PR FOOT EXAM NO CHARGE: CPT | Mod: 25 | Performed by: PHYSICIAN ASSISTANT

## 2024-07-17 PROCEDURE — 80053 COMPREHEN METABOLIC PANEL: CPT | Performed by: PHYSICIAN ASSISTANT

## 2024-07-17 RX ORDER — ATORVASTATIN CALCIUM 40 MG/1
40 TABLET, FILM COATED ORAL DAILY
Qty: 90 TABLET | Refills: 3 | Status: SHIPPED | OUTPATIENT
Start: 2024-07-17

## 2024-07-17 RX ORDER — LISINOPRIL 5 MG/1
5 TABLET ORAL DAILY
Qty: 90 TABLET | Refills: 0 | Status: SHIPPED | OUTPATIENT
Start: 2024-07-17 | End: 2024-07-17

## 2024-07-17 RX ORDER — METFORMIN HCL 500 MG
TABLET, EXTENDED RELEASE 24 HR ORAL
Qty: 90 TABLET | Refills: 0 | Status: SHIPPED | OUTPATIENT
Start: 2024-07-17 | End: 2024-07-17

## 2024-07-17 RX ORDER — METFORMIN HCL 500 MG
TABLET, EXTENDED RELEASE 24 HR ORAL
Qty: 90 TABLET | Refills: 0 | Status: SHIPPED | OUTPATIENT
Start: 2024-07-17

## 2024-07-17 RX ORDER — INSULIN GLARGINE 300 U/ML
12 INJECTION, SOLUTION SUBCUTANEOUS AT BEDTIME
Qty: 1.2 ML | Refills: 0 | Status: SHIPPED | OUTPATIENT
Start: 2024-07-17 | End: 2024-07-17

## 2024-07-17 RX ORDER — INSULIN GLARGINE 300 U/ML
12 INJECTION, SOLUTION SUBCUTANEOUS AT BEDTIME
Qty: 4.5 ML | Refills: 0 | Status: SHIPPED | OUTPATIENT
Start: 2024-07-17 | End: 2024-07-23

## 2024-07-17 RX ORDER — PEN NEEDLE, DIABETIC 32GX 5/32"
NEEDLE, DISPOSABLE MISCELLANEOUS
Qty: 100 EACH | Refills: 3 | Status: SHIPPED | OUTPATIENT
Start: 2024-07-17

## 2024-07-17 RX ORDER — LANCETS
EACH MISCELLANEOUS
Qty: 100 EACH | Refills: 6 | Status: SHIPPED | OUTPATIENT
Start: 2024-07-17 | End: 2024-07-17

## 2024-07-17 RX ORDER — LISINOPRIL 5 MG/1
5 TABLET ORAL DAILY
Qty: 90 TABLET | Refills: 0 | Status: SHIPPED | OUTPATIENT
Start: 2024-07-17

## 2024-07-17 RX ORDER — LANCETS
EACH MISCELLANEOUS
Qty: 100 EACH | Refills: 6 | Status: SHIPPED | OUTPATIENT
Start: 2024-07-17

## 2024-07-17 RX ORDER — ATORVASTATIN CALCIUM 40 MG/1
40 TABLET, FILM COATED ORAL DAILY
Qty: 90 TABLET | Refills: 3 | Status: SHIPPED | OUTPATIENT
Start: 2024-07-17 | End: 2024-07-17

## 2024-07-17 ASSESSMENT — PAIN SCALES - GENERAL: PAINLEVEL: NO PAIN (0)

## 2024-07-17 NOTE — TELEPHONE ENCOUNTER
[facility] pharmacy called to clinic regarding the insulin glargine U-300 (TOUJEO SOLOSTAR) 300 UNIT/ML (1 units dial) pen ordered today.    Dispense amount was ordered as 1.2 mL but this medication comes in packs, which are 4.5 mL each. Pharmacy wondering if provider can re-order as Qty 4.5 mL instead and then they can dispense out to patient. They can't break up a pack to make 1.2 mL.  New Rx is requested.      Routing to provider to review and advise.      Zoila Combs RN  Clinical Triage/Primary Care  [facility]

## 2024-07-17 NOTE — PROGRESS NOTES
Assessment & Plan     (E11.40) Type 2 diabetes mellitus with diabetic neuropathy, without long-term current use of insulin (H)  (primary encounter diagnosis)  Comment: Patient with history of type 2 diabetes.  Concern of elevated glucose levels did send patient for hemoglobin A1c and reviewed in visit elevation greater than 15.  Discussed starting glargine Toujeo.  Patient was provided with a voucher for this medication.  Will restart metformin.  Urgent referral placed for diabetic educator.  Will restart lisinopril 5 mg with plans to recheck in 1 month.  Previously patient was tolerating statin medications without side effects.  Refills provided.  Reviewed with patient should she have lows below 72.  The insulin.  Discussed warning signs to watch for.  Plan: HEMOGLOBIN A1C, Comprehensive metabolic panel         (BMP + Alb, Alk Phos, ALT, AST, Total. Bili,         TP), Lipid panel reflex to direct LDL         Non-fasting, FOOT EXAM, Adult Diabetes         Education  Referral, blood glucose (NO        BRAND SPECIFIED) test strip, blood glucose         monitoring (NO BRAND SPECIFIED) meter device         kit, insulin glargine U-300 (TOUJEO SOLOSTAR)         300 UNIT/ML (1 units dial) pen, metFORMIN         (GLUCOPHAGE XR) 500 MG 24 hr tablet, thin (NO         BRAND SPECIFIED) lancets, insulin pen needle         (BD ANAT U/F) 32G X 4 MM miscellaneous,         DISCONTINUED: blood glucose monitoring (NO         BRAND SPECIFIED) meter device kit,         DISCONTINUED: blood glucose (NO BRAND         SPECIFIED) test strip, DISCONTINUED: thin (NO         BRAND SPECIFIED) lancets, DISCONTINUED: insulin        glargine U-300 (TOUJEO SOLOSTAR) 300 UNIT/ML (1        units dial) pen, DISCONTINUED: metFORMIN         (GLUCOPHAGE XR) 500 MG 24 hr tablet          (N18.1) CKD (chronic kidney disease) stage 1, GFR 90 ml/min or greater  Comment: History of CKD.  Will restart lisinopril 5 mg tablet.  Patient was tolerated in  the past.  Plan: Albumin Random Urine Quantitative with Creat         Ratio, Comprehensive metabolic panel (BMP +         Alb, Alk Phos, ALT, AST, Total. Bili, TP),         lisinopril (ZESTRIL) 5 MG tablet, DISCONTINUED:        lisinopril (ZESTRIL) 5 MG tablet          (E78.5) Hyperlipidemia with target LDL less than 100  Comment: Hyperlipidemia.  Discussed atorvastatin.  No muscle aches with this medication.  Plan: atorvastatin (LIPITOR) 40 MG tablet,         DISCONTINUED: atorvastatin (LIPITOR) 40 MG         tablet          (I12.9) Renal hypertension  Comment: Potential nephrology referral pending results of labs and toleration of medications.  Follow-up in 1 month for recheck of blood pressure  Plan: lisinopril (ZESTRIL) 5 MG tablet, DISCONTINUED:        lisinopril (ZESTRIL) 5 MG tablet                Carmen Granda is a 49 year old, presenting for the following health issues:  Follow Up and Diabetes      7/17/2024     9:07 AM   Additional Questions   Roomed by Parish Lacy     History of Present Illness       Diabetes:   She presents for follow up of diabetes.    She is not checking blood glucose.        She is concerned about blood sugar frequently over 200.   She is having numbness in feet, burning in feet, blurry vision and weight loss.  The patient has not had a diabetic eye exam in the last 12 months.          Hyperlipidemia:  She presents for follow up of hyperlipidemia.   She is not taking medication to lower cholesterol. She is not having myalgia or other side effects to statin medications.    She eats 2-3 servings of fruits and vegetables daily.She consumes 0 sweetened beverage(s) daily.She exercises with enough effort to increase her heart rate 20 to 29 minutes per day.  She exercises with enough effort to increase her heart rate 3 or less days per week.   She is taking medications regularly.     Patient without health insurance for multiple years.  Has history of type 2 diabetes.  Has been  "developing more neuropathy over the dorsum of the left foot.  Patient did have an eye exam yesterday at [facility] without evidence of diabetic retinopathy.  Patient's last A1c was 11.4 2 years ago.  She denies any significant vision changes, urinary changes, vomiting, diarrhea.  With neuropathy developing over her foot she is concerned of elevated glucose.  Previously she had been taking metformin, Victoza, Jardiance, atorvastatin, lisinopril.  Patient denies any side effects with those medications prior.             Review of Systems  Constitutional, HEENT, cardiovascular, pulmonary, gi and gu systems are negative, except as otherwise noted.      Objective    BP (!) 160/78   Pulse 101   Temp 98.2  F (36.8  C) (Tympanic)   Resp 16   Ht 1.54 m (5' 0.63\")   Wt 60.4 kg (133 lb 3.2 oz)   SpO2 99%   BMI 25.48 kg/m    Body mass index is 25.48 kg/m .  Physical Exam   GENERAL: alert and no distress  RESP: lungs clear to auscultation - no rales, rhonchi or wheezes  CV: regular rate and rhythm, normal S1 S2, no S3 or S4, no murmur, click or rub, no peripheral edema  MS: no gross musculoskeletal defects noted, no edema  Diabetic foot exam: normal DP and PT pulses, no trophic changes or ulcerative lesions, normal monofilament exam, however, slightly decreased sensation dorsum of left foot.    Results for orders placed or performed in visit on 07/17/24   HEMOGLOBIN A1C     Status: Abnormal   Result Value Ref Range    Hemoglobin A1C >15.0 (H) 0.0 - 5.6 %           Signed Electronically by: Jeancarlos Azevedo PA-C    "

## 2024-07-17 NOTE — TELEPHONE ENCOUNTER
Pharmacy calling back in regards to different medication than below. Calling about Metformin. The medication quantity in the instructions equals 49 tablets, but the prescription is for 90. Pharmacy asking if patient is to be on this longer than instructions considering it is a 90 day fill.    Routing to provider to review and advise.    Price Ballard RN  [facility]

## 2024-07-18 ENCOUNTER — TELEPHONE (OUTPATIENT)
Dept: FAMILY MEDICINE | Facility: CLINIC | Age: 49
End: 2024-07-18
Payer: COMMERCIAL

## 2024-07-18 NOTE — TELEPHONE ENCOUNTER
Pharmacy calling in regards to insulin glargine U-300 (TOUJEO SOLOSTAR) 300 UNIT/ML (1 units dial) pen- Insurance told them it will be denied. I informed pharmacy that prior authorization has been submitted and pharmacy just wanted FYI sent to provider that patient could be out of insulin if no alternative given in mean time. I informed pharmacy we haven't heard of approved alternatives yet and they said they would let us know if they were informed of any as well.          Jose G Perdomo

## 2024-07-18 NOTE — TELEPHONE ENCOUNTER
Retail Pharmacy Prior Authorization Team   Phone: [phone]    Prior Authorization Approval    Medication: TOUJEO SOLOSTAR 300 UNIT/ML SC SOPN  Authorization Effective Date: 7/1/2024  Authorization Expiration Date: 7/18/2025  Insurance Company: [employer] - Phone [phone] Fax [phone]  Which Pharmacy is filling the prescription: [id number] - [facility] [address] [address]  Pharmacy Notified: YES  Patient Notified: YES (faxed approval letter to pharmacy and notified patient via [employer] message)

## 2024-07-18 NOTE — TELEPHONE ENCOUNTER
Prior Authorization Retail Medication Request    Medication/Dose: insulin glargine U-300 (TOUJEO SOLOSTAR) 300 UNIT/ML (1 units dial) pen  Diagnosis and ICD code (if different than what is on RX):    Type 2 diabetes mellitus with diabetic neuropathy, without long-term current use of insulin (H) [E11.40]         New/renewal/insurance change PA/secondary ins. PA:  Previously Tried and Failed:    Rationale:      Covermymeds Key: [id number]

## 2024-07-23 ENCOUNTER — TELEPHONE (OUTPATIENT)
Dept: FAMILY MEDICINE | Facility: CLINIC | Age: 49
End: 2024-07-23
Payer: COMMERCIAL

## 2024-07-23 DIAGNOSIS — E11.40 TYPE 2 DIABETES MELLITUS WITH DIABETIC NEUROPATHY, WITHOUT LONG-TERM CURRENT USE OF INSULIN (H): ICD-10-CM

## 2024-07-23 RX ORDER — INSULIN GLARGINE 300 U/ML
12 INJECTION, SOLUTION SUBCUTANEOUS AT BEDTIME
Qty: 1.5 ML | Refills: 1 | Status: SHIPPED | OUTPATIENT
Start: 2024-07-23 | End: 2024-07-23

## 2024-07-23 RX ORDER — INSULIN GLARGINE 300 U/ML
12 INJECTION, SOLUTION SUBCUTANEOUS AT BEDTIME
Qty: 4.5 ML | Refills: 0 | Status: SHIPPED | OUTPATIENT
Start: 2024-07-23 | End: 2024-08-19

## 2024-07-23 NOTE — TELEPHONE ENCOUNTER
Called pt's  Ge ([facility] 10/25/12). Relayed message from provider and advised that prescription was sent to [facility]. Ge verbalized understanding. No further questions at this time.        Thank you - Sandra Garay, JAYNEN, RN

## 2024-07-23 NOTE — TELEPHONE ENCOUNTER
"Patient's spouse calling for patient. Consent to communicate on file. Patient was unable to  insulin on the 17th, According to pharmacy it would \"start on 7/1 and end on 7/18\". Pharmacy also stating \"it has already been used\" and cannot get another insulin. Pharmacy recommended they need to talk to PCP before pharmacy would issue another insulin. Spouse states she is all out and cannot receive the one from pharmacy at this time.    Routing to provider high priority as it is an essential medication - to review and advise.    Price Ballard RN  [facility]  "

## 2024-08-12 NOTE — PATIENT INSTRUCTIONS
Patient was advised of today's exam findings.  Fill glasses prescription  Allow 2 weeks to adapt to change in glasses  Return to clinic to verify glasses and for glasses check as needed.     Use over the counter artificial tears 2 times a day as needed  (Soothe XP , Systane Ultra or Refresh Optive)    Mild diabetic retinopathy.  Work on improving blood sugar control  Return in 1 year for diabetic eye exam      Diabetes weakens the blood vessels all over the body, including the eyes. Damage to the blood vessels in the eyes can cause swelling or bleeding into part of the eye (called the retina). This is called diabetic retinopathy (CHRISTIANNE-tin--pu-thee). If not treated, this disease can cause vision loss or blindness.   Symptoms may include blurred or distorted vision, but many people have no symptoms. It's important to see your eye doctor regularly to check for problems.   Early treatment and good control can help protect your vision. Here are the things you can do to help prevent vision loss:      1. Keep your blood sugar levels under tight control.      2. Bring high blood pressure under control.      3. No smoking.      4. Have yearly dilated eye exams.      Karolyn Hurd O.D.  [facility]   [address] [place], [place] [id number] [phone]       Pt last seen 7/10/24. Pt has an appt 10/14/24

## 2024-08-19 ENCOUNTER — OFFICE VISIT (OUTPATIENT)
Dept: FAMILY MEDICINE | Facility: CLINIC | Age: 49
End: 2024-08-19
Payer: COMMERCIAL

## 2024-08-19 VITALS
BODY MASS INDEX: 25.41 KG/M2 | HEART RATE: 89 BPM | OXYGEN SATURATION: 99 % | TEMPERATURE: 97.7 F | SYSTOLIC BLOOD PRESSURE: 130 MMHG | WEIGHT: 134.6 LBS | DIASTOLIC BLOOD PRESSURE: 84 MMHG | HEIGHT: 61 IN | RESPIRATION RATE: 16 BRPM

## 2024-08-19 DIAGNOSIS — E11.40 TYPE 2 DIABETES MELLITUS WITH DIABETIC NEUROPATHY, WITHOUT LONG-TERM CURRENT USE OF INSULIN (H): Primary | ICD-10-CM

## 2024-08-19 DIAGNOSIS — Z12.31 VISIT FOR SCREENING MAMMOGRAM: ICD-10-CM

## 2024-08-19 LAB
ANION GAP SERPL CALCULATED.3IONS-SCNC: 11 MMOL/L (ref 7–15)
BUN SERPL-MCNC: 17.2 MG/DL (ref 6–20)
CALCIUM SERPL-MCNC: 9.2 MG/DL (ref 8.8–10.4)
CHLORIDE SERPL-SCNC: 95 MMOL/L (ref 98–107)
CHOLEST SERPL-MCNC: 303 MG/DL
CREAT SERPL-MCNC: 0.56 MG/DL (ref 0.51–0.95)
EGFRCR SERPLBLD CKD-EPI 2021: >90 ML/MIN/1.73M2
FASTING STATUS PATIENT QL REPORTED: NO
FASTING STATUS PATIENT QL REPORTED: NO
GLUCOSE SERPL-MCNC: 490 MG/DL (ref 70–99)
HCO3 SERPL-SCNC: 23 MMOL/L (ref 22–29)
HDLC SERPL-MCNC: 36 MG/DL
LDLC SERPL CALC-MCNC: ABNORMAL MG/DL
LDLC SERPL DIRECT ASSAY-MCNC: 132 MG/DL
NONHDLC SERPL-MCNC: 267 MG/DL
POTASSIUM SERPL-SCNC: 4.1 MMOL/L (ref 3.4–5.3)
SODIUM SERPL-SCNC: 129 MMOL/L (ref 135–145)
TRIGL SERPL-MCNC: 706 MG/DL

## 2024-08-19 PROCEDURE — 80061 LIPID PANEL: CPT | Performed by: PHYSICIAN ASSISTANT

## 2024-08-19 PROCEDURE — 36415 COLL VENOUS BLD VENIPUNCTURE: CPT | Performed by: PHYSICIAN ASSISTANT

## 2024-08-19 PROCEDURE — G2211 COMPLEX E/M VISIT ADD ON: HCPCS | Performed by: PHYSICIAN ASSISTANT

## 2024-08-19 PROCEDURE — 83721 ASSAY OF BLOOD LIPOPROTEIN: CPT | Mod: 59 | Performed by: PHYSICIAN ASSISTANT

## 2024-08-19 PROCEDURE — 99213 OFFICE O/P EST LOW 20 MIN: CPT | Performed by: PHYSICIAN ASSISTANT

## 2024-08-19 PROCEDURE — 80048 BASIC METABOLIC PNL TOTAL CA: CPT | Performed by: PHYSICIAN ASSISTANT

## 2024-08-19 RX ORDER — INSULIN GLARGINE 300 U/ML
16 INJECTION, SOLUTION SUBCUTANEOUS AT BEDTIME
Qty: 4.5 ML | Refills: 0 | Status: SHIPPED | OUTPATIENT
Start: 2024-08-19

## 2024-08-19 ASSESSMENT — PAIN SCALES - GENERAL: PAINLEVEL: NO PAIN (0)

## 2024-08-19 NOTE — PROGRESS NOTES
Assessment & Plan         (Z12.31) Visit for screening mammogram  Comment:   Plan: MA Screening Bilateral w/ David            (E11.40) Type 2 diabetes mellitus with diabetic neuropathy, without long-term current use of insulin (H)  Comment: Patient with history of type 2 diabetes.  In the past had been diagnosed with retinopathy, however, had eye exam 1 month ago that did not note diabetic retinopathy.  She does have neuropathy of the left lower leg.  Patient has been started on glargine at 12 units.  Restarted metformin.  Has only titrated up to 1 pill twice daily for metformin.  Discussed continue to increase metformin dose.  Patient history of elevated urine albumin screening.  Does appear consistent with chronic kidney disease.  Discussed with patient Jardiance which she has tolerated in the past.  Had previously followed with endocrinology for diabetic management.  Will increase glargine to 16 units, Jardiance 10 mg daily, and titrate up metformin to 1000 mg twice daily.  Patient has been taking statin medication.  Discussed following up in 1 month for discussion of blood sugars.  She may send a message and require adjustments in glargine at that time.  Patient is not been experiencing any lows.  Discussed potential of gabapentin.  Plan: insulin glargine U-300 (TOUJEO SOLOSTAR) 300         UNIT/ML (1 units dial) pen, empagliflozin         (JARDIANCE) 10 MG TABS tablet, Basic metabolic         panel  (Ca, Cl, CO2, Creat, Gluc, K, Na, BUN),         Lipid panel reflex to direct LDL Fasting, Adult        Endocrinology  Referral               Subjective   Jesus Alberto is a 49 year old, presenting for the following health issues:  Follow Up and Diabetes      8/19/2024     9:06 AM   Additional Questions   Roomed by Parish Lacy   Accompanied by SELF     Via the Health Maintenance questionnaire, the patient has reported the following services have been completed -Eye Exam: [facility] 2024-07-08, this  information has been sent to the abstraction team.  History of Present Illness       Diabetes:   She presents for follow up of diabetes.  She is checking home blood glucose one time daily.   She checks blood glucose before meals.  Blood glucose is sometimes over 200 and never under 70. She is aware of hypoglycemia symptoms including shakiness and weakness.   She is concerned about blood sugar frequently over 200.   She is having numbness in feet, burning in feet and redness, sores, or blisters on feet.  The patient has had a diabetic eye exam in the last 12 months. Eye exam performed on 7/8/2024. Location of last eye exam [facility].    She consumes 0 sweetened beverage(s) daily.She exercises with enough effort to increase her heart rate 9 or less minutes per day.  She exercises with enough effort to increase her heart rate 3 or less days per week.   She is taking medications regularly.       Patient without health insurance for multiple years.  History of type 2 diabetes has not been on any medication for multiple months.  Developing neuropathy dorsum of left foot.  Patient had eye exam without concerns 1 month ago [facility].    Prior A1c greater than 15.  Patient was started on glargine.  Patient was on 12 units and notes his sugars have continued to be in the 300 range.  She denies any significant vision changes, urinary changes, vomiting, diarrhea.  Patient has been taking metformin only taking 1 pill twice daily.  Previously she had been taking metformin, Victoza, Jardiance, atorvastatin, lisinopril.  Patient denies any side effects with those medications prior.  Previously followed with endocrinology.  Denies any side effects with insulin.  Is not experiencing any lows below 70.          Review of Systems  Constitutional, HEENT, cardiovascular, pulmonary, gi and gu systems are negative, except as otherwise noted.      Objective    /84   Pulse 89   Temp 97.7  F (36.5  C) (Tympanic)   Resp 16  "  Ht 1.537 m (5' 0.5\")   Wt 61.1 kg (134 lb 9.6 oz)   SpO2 99%   BMI 25.85 kg/m    Body mass index is 25.85 kg/m .  Physical Exam   GENERAL: alert and no distress  RESP: lungs clear to auscultation - no rales, rhonchi or wheezes  CV: regular rate and rhythm, normal S1 S2, no S3 or S4, no murmur, click or rub, no peripheral edema  MS: no gross musculoskeletal defects noted, no edema            Signed Electronically by: Jeancarlos Azevedo PA-C    "

## 2024-09-08 ENCOUNTER — HEALTH MAINTENANCE LETTER (OUTPATIENT)
Age: 49
End: 2024-09-08

## 2024-11-01 ENCOUNTER — OFFICE VISIT (OUTPATIENT)
Dept: URGENT CARE | Facility: URGENT CARE | Age: 49
End: 2024-11-01
Payer: COMMERCIAL

## 2024-11-01 VITALS
SYSTOLIC BLOOD PRESSURE: 164 MMHG | TEMPERATURE: 97.6 F | OXYGEN SATURATION: 100 % | RESPIRATION RATE: 16 BRPM | WEIGHT: 134.2 LBS | BODY MASS INDEX: 25.78 KG/M2 | HEART RATE: 91 BPM | DIASTOLIC BLOOD PRESSURE: 93 MMHG

## 2024-11-01 DIAGNOSIS — H57.89 EYE REDNESS: Primary | ICD-10-CM

## 2024-11-01 DIAGNOSIS — E11.3293 TYPE 2 DIABETES MELLITUS WITH BOTH EYES AFFECTED BY MILD NONPROLIFERATIVE RETINOPATHY WITHOUT MACULAR EDEMA, WITHOUT LONG-TERM CURRENT USE OF INSULIN (H): ICD-10-CM

## 2024-11-01 PROCEDURE — 99214 OFFICE O/P EST MOD 30 MIN: CPT | Performed by: STUDENT IN AN ORGANIZED HEALTH CARE EDUCATION/TRAINING PROGRAM

## 2024-11-02 NOTE — PROGRESS NOTES
Assessment & Plan     Eye redness  Type 2 diabetes mellitus with both eyes affected by mild nonproliferative retinopathy without macular edema, without long-term current use of insulin (H)  Patient presents to  with painful left red eye with light sensitivity, mild blurring of vision, headache about the left eye. She has no itching or drainage to suggest infectious cause. She has poorly controlled diabetes with last A1C of >15 increasing risk for more serious eye pathologies. I advised she is seen by ophthalmology in the next 24 hours either in the ED or at an eye clinic tomorrow. We discussed possibility of glaucoma and that left untreated can lead to loss of vision in the eye and that prompt evaluation by an eye doctor is necessary to rule this out. Patient and  verbalize understanding and they prefer to call in the morning to find an eye doctor who will see her. I sent her My Chart with some possible options that were given to me after she left including [facility], [facility] or [facility].       30 minutes spent by me on the date of the encounter doing chart review, patient visit, documentation, and discussion with family         No follow-ups on file.    ETHAN [facility]     Jesus Alberto is a 49 year old female who presents to clinic today for the following health issues:  Chief Complaint   Patient presents with    Redness/discharge Of Eye    Eye Pain         11/1/2024     7:01 PM   Additional Questions   Roomed by PERCY GRADY   Accompanied by  (GABY)     HPI  Lab Results   Component Value Date    A1C >15.0 07/17/2024    A1C 11.4 08/24/2021    A1C 12.0 01/26/2021    A1C 11.7 12/20/2018    A1C 8.6 04/24/2018    A1C 11.3 02/07/2018    A1C 7.6 10/06/2013         Review of Systems  Constitutional, HEENT, cardiovascular, pulmonary, GI, , musculoskeletal, neuro, skin, endocrine and psych systems are negative, except as  otherwise noted.      Objective    BP (!) 164/93   Pulse 91   Temp 97.6  F (36.4  C) (Tympanic)   Resp 16   Wt 60.9 kg (134 lb 3.2 oz)   SpO2 100%   BMI 25.78 kg/m    Physical Exam   GENERAL: alert and no distress  EYES: PERRL, EOMI, and conjunctiva/cornea of left eye is completely red, no lesions, no drainage, mild blurring of vision in left eye with distance  SKIN: no suspicious lesions or rashes  NEURO: Normal strength and tone, mentation intact and speech normal  PSYCH: mentation appears normal, affect normal/bright

## 2024-11-17 ENCOUNTER — HEALTH MAINTENANCE LETTER (OUTPATIENT)
Age: 49
End: 2024-11-17

## 2024-11-19 DIAGNOSIS — I12.9 RENAL HYPERTENSION: ICD-10-CM

## 2024-11-19 DIAGNOSIS — N18.1 CKD (CHRONIC KIDNEY DISEASE) STAGE 1, GFR 90 ML/MIN OR GREATER: ICD-10-CM

## 2024-11-20 RX ORDER — LISINOPRIL 5 MG/1
5 TABLET ORAL DAILY
Qty: 90 TABLET | Refills: 0 | Status: SHIPPED | OUTPATIENT
Start: 2024-11-20

## 2025-01-14 ENCOUNTER — ORDERS ONLY (AUTO-RELEASED) (OUTPATIENT)
Dept: FAMILY MEDICINE | Facility: CLINIC | Age: 50
End: 2025-01-14

## 2025-01-14 ENCOUNTER — OFFICE VISIT (OUTPATIENT)
Dept: FAMILY MEDICINE | Facility: CLINIC | Age: 50
End: 2025-01-14
Payer: COMMERCIAL

## 2025-01-14 VITALS
RESPIRATION RATE: 16 BRPM | HEART RATE: 92 BPM | TEMPERATURE: 97.9 F | SYSTOLIC BLOOD PRESSURE: 126 MMHG | OXYGEN SATURATION: 99 % | WEIGHT: 128 LBS | DIASTOLIC BLOOD PRESSURE: 72 MMHG | BODY MASS INDEX: 24.59 KG/M2

## 2025-01-14 DIAGNOSIS — E78.5 HYPERLIPIDEMIA WITH TARGET LDL LESS THAN 100: ICD-10-CM

## 2025-01-14 DIAGNOSIS — Z71.89 ADVANCE CARE PLANNING: ICD-10-CM

## 2025-01-14 DIAGNOSIS — Z12.11 COLON CANCER SCREENING: ICD-10-CM

## 2025-01-14 DIAGNOSIS — E11.3293 TYPE 2 DIABETES MELLITUS WITH BOTH EYES AFFECTED BY MILD NONPROLIFERATIVE RETINOPATHY WITHOUT MACULAR EDEMA, WITHOUT LONG-TERM CURRENT USE OF INSULIN (H): ICD-10-CM

## 2025-01-14 DIAGNOSIS — Z12.11 SCREEN FOR COLON CANCER: ICD-10-CM

## 2025-01-14 DIAGNOSIS — Z00.00 ROUTINE GENERAL MEDICAL EXAMINATION AT A HEALTH CARE FACILITY: Primary | ICD-10-CM

## 2025-01-14 DIAGNOSIS — E11.40 TYPE 2 DIABETES MELLITUS WITH DIABETIC NEUROPATHY, WITHOUT LONG-TERM CURRENT USE OF INSULIN (H): ICD-10-CM

## 2025-01-14 DIAGNOSIS — I12.9 RENAL HYPERTENSION: ICD-10-CM

## 2025-01-14 DIAGNOSIS — N18.1 CKD (CHRONIC KIDNEY DISEASE) STAGE 1, GFR 90 ML/MIN OR GREATER: ICD-10-CM

## 2025-01-14 DIAGNOSIS — E11.42 DIABETIC POLYNEUROPATHY ASSOCIATED WITH TYPE 2 DIABETES MELLITUS (H): ICD-10-CM

## 2025-01-14 PROBLEM — N20.1 URETERAL STONE: Status: ACTIVE | Noted: 2018-03-01

## 2025-01-14 LAB
EST. AVERAGE GLUCOSE BLD GHB EST-MCNC: 361 MG/DL
HBA1C MFR BLD: 14.2 % (ref 0–5.6)
HOLD SPECIMEN: NORMAL
HOLD SPECIMEN: NORMAL

## 2025-01-14 PROCEDURE — 83721 ASSAY OF BLOOD LIPOPROTEIN: CPT | Mod: 59 | Performed by: STUDENT IN AN ORGANIZED HEALTH CARE EDUCATION/TRAINING PROGRAM

## 2025-01-14 PROCEDURE — 80061 LIPID PANEL: CPT | Performed by: STUDENT IN AN ORGANIZED HEALTH CARE EDUCATION/TRAINING PROGRAM

## 2025-01-14 PROCEDURE — 99396 PREV VISIT EST AGE 40-64: CPT | Performed by: STUDENT IN AN ORGANIZED HEALTH CARE EDUCATION/TRAINING PROGRAM

## 2025-01-14 PROCEDURE — 99214 OFFICE O/P EST MOD 30 MIN: CPT | Mod: 25 | Performed by: STUDENT IN AN ORGANIZED HEALTH CARE EDUCATION/TRAINING PROGRAM

## 2025-01-14 PROCEDURE — 83036 HEMOGLOBIN GLYCOSYLATED A1C: CPT | Performed by: STUDENT IN AN ORGANIZED HEALTH CARE EDUCATION/TRAINING PROGRAM

## 2025-01-14 PROCEDURE — 36415 COLL VENOUS BLD VENIPUNCTURE: CPT | Performed by: STUDENT IN AN ORGANIZED HEALTH CARE EDUCATION/TRAINING PROGRAM

## 2025-01-14 RX ORDER — METFORMIN HYDROCHLORIDE 500 MG/1
TABLET, EXTENDED RELEASE ORAL
Qty: 180 TABLET | Refills: 3 | Status: SHIPPED | OUTPATIENT
Start: 2025-01-14 | End: 2025-01-14

## 2025-01-14 RX ORDER — LISINOPRIL 5 MG/1
5 TABLET ORAL DAILY
Qty: 90 TABLET | Refills: 3 | Status: SHIPPED | OUTPATIENT
Start: 2025-01-14

## 2025-01-14 RX ORDER — METFORMIN HYDROCHLORIDE 500 MG/1
TABLET, EXTENDED RELEASE ORAL
Qty: 180 TABLET | Refills: 3 | Status: SHIPPED | OUTPATIENT
Start: 2025-01-14

## 2025-01-14 SDOH — HEALTH STABILITY: PHYSICAL HEALTH: ON AVERAGE, HOW MANY MINUTES DO YOU ENGAGE IN EXERCISE AT THIS LEVEL?: 30 MIN

## 2025-01-14 SDOH — HEALTH STABILITY: PHYSICAL HEALTH: ON AVERAGE, HOW MANY DAYS PER WEEK DO YOU ENGAGE IN MODERATE TO STRENUOUS EXERCISE (LIKE A BRISK WALK)?: 2 DAYS

## 2025-01-14 ASSESSMENT — SOCIAL DETERMINANTS OF HEALTH (SDOH): HOW OFTEN DO YOU GET TOGETHER WITH FRIENDS OR RELATIVES?: NEVER

## 2025-01-14 NOTE — PATIENT INSTRUCTIONS
Patient Education   Preventive Care Advice     METFORMIN  Take 1 tablet (500 mg) in the morning and 2 tablets (1000 mg) in the evening for 7 days, next increase to 2 tablets (1000 mg) in the morning and 2 tablets (1000 mg) in the evening  Jardiance 25 mg daily  Insulin 16 units daily  Atorvastatin: 40 mg at bedtime  Lisinopril: 5 mg daily  This is general advice given by our system to help you stay healthy. However, your care team may have specific advice just for you. Please talk to your care team about your preventive care needs.  Nutrition  Eat 5 or more servings of fruits and vegetables each day.  Try wheat bread, brown rice and whole grain pasta (instead of white bread, rice, and pasta).  Get enough calcium and vitamin D. Check the label on foods and aim for 100% of the RDA (recommended daily allowance).  Lifestyle  Exercise at least 150 minutes each week  (30 minutes a day, 5 days a week).  Do muscle strengthening activities 2 days a week. These help control your weight and prevent disease.  No smoking.  Wear sunscreen to prevent skin cancer.  Have a dental exam and cleaning every 6 months.  Yearly exams  See your health care team every year to talk about:  Any changes in your health.  Any medicines your care team has prescribed.  Preventive care, family planning, and ways to prevent chronic diseases.  Shots (vaccines)   HPV shots (up to age 26), if you've never had them before.  Hepatitis B shots (up to age 59), if you've never had them before.  COVID-19 shot: Get this shot when it's due.  Flu shot: Get a flu shot every year.  Tetanus shot: Get a tetanus shot every 10 years.  Pneumococcal, hepatitis A, and RSV shots: Ask your care team if you need these based on your risk.  Shingles shot (for age 50 and up)  General health tests  Diabetes screening:  Starting at age 35, Get screened for diabetes at least every 3 years.  If you are younger than age 35, ask your care team if you should be screened for  diabetes.  Cholesterol test: At age 39, start having a cholesterol test every 5 years, or more often if advised.  Bone density scan (DEXA): At age 50, ask your care team if you should have this scan for osteoporosis (brittle bones).  Hepatitis C: Get tested at least once in your life.  STIs (sexually transmitted infections)  Before age 24: Ask your care team if you should be screened for STIs.  After age 24: Get screened for STIs if you're at risk. You are at risk for STIs (including HIV) if:  You are sexually active with more than one person.  You don't use condoms every time.  You or a partner was diagnosed with a sexually transmitted infection.  If you are at risk for HIV, ask about PrEP medicine to prevent HIV.  Get tested for HIV at least once in your life, whether you are at risk for HIV or not.  Cancer screening tests  Cervical cancer screening: If you have a cervix, begin getting regular cervical cancer screening tests starting at age 21.  Breast cancer scan (mammogram): If you've ever had breasts, begin having regular mammograms starting at age 40. This is a scan to check for breast cancer.  Colon cancer screening: It is important to start screening for colon cancer at age 45.  Have a colonoscopy test every 10 years (or more often if you're at risk) Or, ask your provider about stool tests like a FIT test every year or Cologuard test every 3 years.  To learn more about your testing options, visit:   .  For help making a decision, visit:   https://bit.ly/dd79808.  Prostate cancer screening test: If you have a prostate, ask your care team if a prostate cancer screening test (PSA) at age 55 is right for you.  Lung cancer screening: If you are a current or former smoker ages 50 to 80, ask your care team if ongoing lung cancer screenings are right for you.  For informational purposes only. Not to replace the advice of your health care provider. Copyright   2023 [employer]. All rights reserved.  Clinically reviewed by the [facility] Transitions Program. [employer] [id number] - REV 01/24.

## 2025-01-14 NOTE — PROGRESS NOTES
Preventive Care Visit  [facility] SJ Alvarado MD, Family Medicine  Jan 14, 2025      Assessment & Plan     Routine general medical examination at a health care facility      Type 2 diabetes mellitus with both eyes affected by mild nonproliferative retinopathy without macular edema, without long-term current use of insulin (H)  Uncontrolled. I discussed the risks of uncontrolled diabetes with the patient and her spouse. I provided her with contact information to schedule an appointment with an endocrinologist and a diabetes educator. Additionally, I wrote down all the medications she is supposed to be taking, as it seems she is not currently taking all of them.  - HEMOGLOBIN A1C; Future  - HEMOGLOBIN A1C  Increase Jardiance from 10 mg to 25 mg.  1 tablet (500 mg) in the morning and 2 tablets (1000 mg) in the evening for 7 days, next increase to 2 tablets (1000 mg) in the morning and 2 tablets (1000 mg) in the evening  Insulin 16 units daily  CKD (chronic kidney disease) stage 1, GFR 90 ml/min or greater  stable  - lisinopril (ZESTRIL) 5 MG tablet; Take 1 tablet (5 mg) by mouth daily.  stable  Renal hypertension  stable  - lisinopril (ZESTRIL) 5 MG tablet; Take 1 tablet (5 mg) by mouth daily.    Type 2 diabetes mellitus with diabetic neuropathy, without long-term current use of insulin (H)  uncontrolled  - empagliflozin (JARDIANCE) 25 MG TABS tablet; Take 1 tablet (25 mg) by mouth daily.  - metFORMIN (GLUCOPHAGE XR) 500 MG 24 hr tablet; Take 1 tablet (500 mg) in the morning and 2 tablets (1000 mg) in the evening for 7 days, next increase to 2 tablets (1000 mg) in the morning and 2 tablets (1000 mg) in the evening    Hyperlipidemia  Uncontrolled, continue statin  -lipid  Diabetes Neuropathy  Uncontrolled.   Advance care planning      Screen for colon cancer      Colon cancer screening    - KORY[employer]); Future  Follow up in 2 weeks  Patient has been advised of split  billing requirements and indicates understanding: Yes            Counseling  Appropriate preventive services were addressed with this patient via screening, questionnaire, or discussion as appropriate for fall prevention, nutrition, physical activity, Tobacco-use cessation, social engagement, weight loss and cognition.  Checklist reviewing preventive services available has been given to the patient.  Reviewed patient's diet, addressing concerns and/or questions.   She is at risk for lack of exercise and has been provided with information to increase physical activity for the benefit of her well-being.   Patient is at risk for social isolation and has been provided with information about the benefit of social connection.   The patient was instructed to see the dentist every 6 months.         Carmen Granda is a 49 year old, presenting for the following:  Recheck Medication        1/14/2025     3:10 PM   Additional Questions   Roomed by Sandra   Accompanied by Significant other         1/14/2025     3:10 PM   Patient Reported Additional Medications   Patient reports taking the following new medications NA          HPI      Patient with history of Diabetes, tingling , numbness, Hyperlipidemia and Hypertension arrived for Annual Physical.     A1C collected upon arrival.     History of Present Illness       Diabetes:   She presents for follow up of diabetes.  She is checking home blood glucose two times daily.   She checks blood glucose before and after meals.  Blood glucose is sometimes over 200 and never under 70. She is aware of hypoglycemia symptoms including shakiness and weakness.   She is concerned about blood sugar frequently over 200.   She is having numbness in feet, burning in feet, excessive thirst, blurry vision and weight loss.            Hyperlipidemia:  She presents for follow up of hyperlipidemia.   She is taking medication to lower cholesterol. She is having myalgia or other side effects to statin  medications.    Hypertension: She presents for follow up of hypertension.  She does not check blood pressure  regularly outside of the clinic. Outpatient blood pressures have not been over 140/90. She does not follow a low salt diet.     She eats 2-3 servings of fruits and vegetables daily.She consumes 0 sweetened beverage(s) daily.She exercises with enough effort to increase her heart rate 10 to 19 minutes per day.  She exercises with enough effort to increase her heart rate 3 or less days per week.   She is taking medications regularly.     BP Readings from Last 2 Encounters:   01/14/25 (!) 146/86   11/01/24 (!) 164/93     Hemoglobin A1C (%)   Date Value   07/17/2024 >15.0 (H)   08/24/2021 11.4 (H)   01/26/2021 12.0 (H)   12/20/2018 11.7 (H)     LDL Cholesterol Calculated   Date Value   08/19/2024      Comment:     Cannot estimate LDL when triglyceride exceeds 400 mg/dL   07/17/2024      Comment:     Cannot estimate LDL when triglyceride exceeds 400 mg/dL   01/26/2021     Cannot estimate LDL when triglyceride exceeds 400 mg/dL mg/dL   02/07/2018 84 mg/dL     LDL Cholesterol Direct (mg/dL)   Date Value   08/19/2024 132 (H)   07/17/2024 104 (H)   01/26/2021 68   04/24/2018 153 (H)             Health Care Directive        1/14/2025   General Health   How would you rate your overall physical health? Good   Feel stress (tense, anxious, or unable to sleep) Not at all         1/14/2025   Nutrition   Three or more servings of calcium each day? (!) NO   Diet: Regular (no restrictions)   How many servings of fruit and vegetables per day? (!) 2-3   How many sweetened beverages each day? 0-1         1/14/2025   Exercise   Days per week of moderate/strenous exercise 2 days   Average minutes spent exercising at this level 30 min   (!) EXERCISE CONCERN      1/14/2025   Social Factors   Frequency of gathering with friends or relatives Never   Worry food won't last until get money to buy more No   Food not last or not have enough  money for food? No   Do you have housing? (Housing is defined as stable permanent housing and does not include staying ouside in a car, in a tent, in an abandoned building, in an overnight shelter, or couch-surfing.) Yes   Are you worried about losing your housing? No   Lack of transportation? No   Unable to get utilities (heat,electricity)? No   (!) SOCIAL CONNECTIONS CONCERN      1/14/2025   Dental   Dentist two times every year? (!) NO         1/14/2025   TB Screening   Were you born outside of the [place]? Yes         Today's PHQ-2 Score:       1/13/2025     5:49 PM   PHQ-2 ( 1999 [employer])   Q1: Little interest or pleasure in doing things 1   Q2: Feeling down, depressed or hopeless 0   PHQ-2 Score 1    Q1: Little interest or pleasure in doing things Several days   Q2: Feeling down, depressed or hopeless Not at all   PHQ-2 Score 1       Patient-reported           1/14/2025   Substance Use   Alcohol more than 3/day or more than 7/wk No   Do you use any other substances recreationally? No     Social History     Tobacco Use    Smoking status: Never    Smokeless tobacco: Never    Tobacco comments:     non-smoking household   Vaping Use    Vaping status: Never Used   Substance Use Topics    Alcohol use: No    Drug use: No          Mammogram Screening - Mammogram every 1-2 years updated in Health Maintenance based on mutual decision making        1/14/2025   STI Screening   New sexual partner(s) since last STI/HIV test? No     History of abnormal Pap smear:         Latest Ref Rng & Units 4/24/2018    10:13 AM 4/24/2018    10:12 AM 6/23/2010    12:00 AM   PAP / HPV   PAP (Historical)  NIL   NIL    HPV 16 DNA NEG^Negative  Negative     HPV 18 DNA NEG^Negative  Negative     Other HR HPV NEG^Negative  Negative       ASCVD Risk   The 10-year ASCVD risk score (Hoang JORGENSEN, et al., 2019) is: 6.3%    Values used to calculate the score:      Age: 49 years      Sex: Female      Is Non- : No       Diabetic: Yes      Tobacco smoker: No      Systolic Blood Pressure: 126 mmHg      Is BP treated: No      HDL Cholesterol: 36 mg/dL      Total Cholesterol: 303 mg/dL        2025   Contraception/Family Planning   Questions about contraception or family planning No       Reviewed and updated as needed this visit by Provider      Past Medical History:   Diagnosis Date    Anemia due to blood loss 2019    CKD (chronic kidney disease) stage 1, GFR 90 ml/min or greater     Hyperlipidemia     Mild nonproliferative diabetic retinopathy of both eyes without macular edema associated with type 2 diabetes mellitus (H)     Obesity 2012    Renal hypertension 2018    Renal stone     Type 2 diabetes, HbA1c goal < 7% (H) 2013    Vitamin D deficiency      Past Surgical History:   Procedure Laterality Date    CYSTOSCOPY,URETEROSCOPY,LITHOTRIPSY  2018    left stent placement     HC TOOTH EXTRACTION W/FORCEP       OB History    Para Term  AB Living   9 8 8 0 1 8   SAB IAB Ectopic Multiple Live Births   1 0 0 0 8      # Outcome Date GA Lbr Elliott/2nd Weight Sex Type Anes PTL Lv   9 Term 13 38w4d 08:06 / 00:02 4.111 kg (9 lb 1 oz) M Vag-Spont None N BELÉN      Name: MAYANK SAL THANEE      Apgar1: 5  Apgar5: 9   8 Term 12/10/10 40w0d  3.629 kg (8 lb) F Vag-Spont         Birth Comments: System Generated. Please review and update pregnancy details.      Name: Elaina   7 Term 06 40w0d  3.629 kg (8 lb) M  None        Birth Comments: gest diabetes      Name: Landen   6 Term 05 40w0d  3.629 kg (8 lb) M  None        Birth Comments: uncomplicated      Name: Reese   5 Term 04 40w0d  3.629 kg (8 lb) F  None        Birth Comments: uncomplicated      Name: Madison   4 Term 99 40w0d  3.629 kg (8 lb) M  None        Birth Comments: uncomplicated      Name: Richard   3 Term 98 39w0d  3.629 kg (8 lb) F  None        Birth Comments: uncomplicated      Name: Yoko   2  "Term 94 40w0d  3.345 kg (7 lb 6 oz) F  None        Birth Comments: uncomplicated      Name: Chata Freeman SAB              Lab work is in process  Labs reviewed in EPIC      Review of Systems  Constitutional, HEENT, cardiovascular, pulmonary, GI, , musculoskeletal, neuro, skin, endocrine and psych systems are negative, except as otherwise noted.     Objective    Exam  /72   Pulse 92   Temp 97.9  F (36.6  C) (Tympanic)   Resp 16   Wt 58.1 kg (128 lb)   SpO2 99%   BMI 24.59 kg/m     Estimated body mass index is 24.59 kg/m  as calculated from the following:    Height as of 24: 1.537 m (5' 0.5\").    Weight as of this encounter: 58.1 kg (128 lb).    Physical Exam  GENERAL: alert and no distress  EYES: Eyes grossly normal to inspection, PERRL and conjunctivae and sclerae normal  HENT: ear canals and TM's normal, nose and mouth without ulcers or lesions  NECK: no adenopathy, no asymmetry, masses, or scars  RESP: lungs clear to auscultation - no rales, rhonchi or wheezes  CV: regular rate and rhythm, normal S1 S2, no S3 or S4, no murmur, click or rub, no peripheral edema  ABDOMEN: soft, nontender, no hepatosplenomegaly, no masses and bowel sounds normal  MS: no gross musculoskeletal defects noted, no edema  SKIN: no suspicious lesions or rashes  NEURO: Normal strength and tone, mentation intact and speech normal  PSYCH: mentation appears normal, affect normal/bright        Signed Electronically by: Radha Alvarado MD    "

## 2025-01-15 LAB
CHOLEST SERPL-MCNC: 254 MG/DL
HDLC SERPL-MCNC: 35 MG/DL
LDLC SERPL CALC-MCNC: ABNORMAL MG/DL
LDLC SERPL DIRECT ASSAY-MCNC: 154 MG/DL
NONHDLC SERPL-MCNC: 219 MG/DL
TRIGL SERPL-MCNC: 438 MG/DL

## 2025-01-29 LAB — NONINV COLON CA DNA+OCC BLD SCRN STL QL: NEGATIVE

## 2025-02-03 ENCOUNTER — VIRTUAL VISIT (OUTPATIENT)
Dept: EDUCATION SERVICES | Facility: CLINIC | Age: 50
End: 2025-02-03
Attending: PHYSICIAN ASSISTANT
Payer: COMMERCIAL

## 2025-02-03 DIAGNOSIS — E11.40 TYPE 2 DIABETES MELLITUS WITH DIABETIC NEUROPATHY, WITHOUT LONG-TERM CURRENT USE OF INSULIN (H): ICD-10-CM

## 2025-02-03 PROCEDURE — G0108 DIAB MANAGE TRN  PER INDIV: HCPCS | Mod: 95 | Performed by: DIETITIAN, REGISTERED

## 2025-02-03 RX ORDER — INSULIN ASPART 100 [IU]/ML
INJECTION, SOLUTION INTRAVENOUS; SUBCUTANEOUS
Qty: 15 ML | Refills: 3 | Status: SHIPPED | OUTPATIENT
Start: 2025-02-03

## 2025-02-03 RX ORDER — KETOROLAC TROMETHAMINE 30 MG/ML
1 INJECTION, SOLUTION INTRAMUSCULAR; INTRAVENOUS CONTINUOUS
Qty: 1 EACH | Refills: 0 | OUTPATIENT
Start: 2025-02-03

## 2025-02-03 RX ORDER — PEN NEEDLE, DIABETIC 32GX 5/32"
NEEDLE, DISPOSABLE MISCELLANEOUS
Qty: 300 EACH | Refills: 3 | Status: SHIPPED | OUTPATIENT
Start: 2025-02-03

## 2025-02-03 RX ORDER — HYDROCHLOROTHIAZIDE 12.5 MG/1
CAPSULE ORAL
Qty: 6 EACH | Refills: 1 | OUTPATIENT
Start: 2025-02-03

## 2025-02-03 NOTE — PROGRESS NOTES
Virtual Visit Details    Type of service:  Video Visit   Video Start Time:  9:38am  Video End Time: 10:15am      Originating Location (pt. Location): Home  Distant Location (provider location):  Off-site  Platform used for Video Visit: Юлия    Current patient location: [address] [place] [address]  [place] [address]    Is the patient currently in the state of [place]? YES    Visit mode: VIDEO    If the visit is dropped, the patient can be reconnected by:VIDEO VISIT: Text to cell phone:   Telephone Information:   Mobile [phone]       Will anyone else be joining the visit? NO  (If patient encounters technical issues they should call [phone])    Are changes needed to the allergy or medication list? No    Are refills needed on medications prescribed by this physician? NO    Rooming Documentation:  Questionnaire(s) completed    Reason for visit: Diabetes Education    Lori Cantrell F    Diabetes Self-Management Education & Support    Jesus Alberto Denson presents today for education related to Type 2 diabetes    Patient is being treated with:  Oral Agents and Insulin (less than 3 injections daily)  She is accompanied by self    Year of diagnosis: 2009- not formally diagnosed until 2013/2014  Referring provider:  Jeancarlos Hicks  Living Situation: home  Employment: not discussed    PATIENT CONCERNS/REASON FOR REFERRAL Elevated blood sugars would like to get them down      ASSESSMENT:  Patient's blood sugar have been significantly elevated for a while.  Recommend titrating insulin, then assessing if GLP-1 class is an option.  Appears patient has a history of not taking insulin sometimes.  She would benefit from CGM for further data and help with insulin titration.      Currently at 0.34u/kg toujeo at bedtime.  Taking as directed at 0-0-0-20  Taking metformin, jardiance as recommended.     Taking Medication:     Current Diabetes Management per Patient:  Taking diabetes medications? yes:     Diabetes Medication(s)        Biguanides       metFORMIN (GLUCOPHAGE XR) 500 MG 24 hr tablet Take 1 tablet (500 mg) in the morning and 2 tablets (1000 mg) in the evening for 7 days, next increase to 2 tablets (1000 mg) in the morning and 2 tablets (1000 mg) in the evening     metFORMIN (GLUCOPHAGE-XR) 500 MG 24 hr tablet TAKE 2 TABLETS (1,000 MG) BY MOUTH 2 TIMES DAILY (WITH MEALS)       Insulin       insulin aspart (NOVOLOG FLEXPEN) 100 UNIT/ML pen Inject 4 units before breakfast, 4 units before lunch, 4 units before dinner.  Ordered today     insulin glargine U-300 (TOUJEO SOLOSTAR) 300 UNIT/ML (1 units dial) pen Inject 16 Units subcutaneously at bedtime       Sodium-Glucose Co-Transporter 2 (SGLT2) Inhibitors       empagliflozin (JARDIANCE) 10 MG TABS tablet Take 1 tablet (10 mg) by mouth daily     empagliflozin (JARDIANCE) 25 MG TABS tablet Take 1 tablet (25 mg) by mouth daily.       Incretin Mimetic Agents       liraglutide (VICTOZA) 18 MG/3ML solution Inject 0.6 mg Subcutaneous daily for 7 days, THEN 1.2 mg daily for 7 days, THEN 1.8 mg daily. -not taking            Monitoring    Patient glucose self monitoring as follows: three times daily  BG meter: glucometer  CGM: none  BG results: not available     Tests blood sugar- fasting- 290  After eating 2 hours-300-320  Doesn't see hi reading    Patient's most recent   Lab Results   Component Value Date    A1C 14.2 01/14/2025    A1C 12.0 01/26/2021      Patient's A1C goal: <7.0    Activity: sporadic or irregular exercise.  Mostly does walking around home or 15 minutes walks.    Healthy Eating:   Typically eats 3 meals a day.  Usually rice and stir goetz.       Problem Solving:      Patient is at risk of hypoglycemia?: YES  Hospitalizations for hyper or hypoglycemia: No      EDUCATION and INSTRUCTION PROVIDED AT THIS VISIT:     Provided education on different types of insulin.  Discussed benefit of rapid acting insulin.  Discussed CGM use, patient was interested as her mother currently uses  one.      Patient-stated goal written and given to Jesus Alberto Denson.  Verbalized and demonstrated understanding of instructions.     PLAN:  Start Novolog 4-4-4-0  Start Freestyle louisa 3    See patient instructions  AVS printed and given to patient    FOLLOW-UP:    2 weeks    Gabbi Arnett MS, RD, LD, CDE  Time spent with patient at today's visit was 37 minutes.        Any diabetes medication initiation or dose changes were [facility] per the patient's referring provider. A copy of this encounter was shared with the provider.

## 2025-02-03 NOTE — PATIENT INSTRUCTIONS
Contact information:   If you have concerns, please send a [place] message or call the clinic at [phone].    For more urgent concerns that do not require 911, please call [phone] after hours/weekends and ask to speak with the Endocrinologist on call.    To schedule a Diabetes Education appointment, call [phone]    Please let us know if you are having low blood sugars less than 70 or over 300 mg/dL.  Do not wait until your next appointment if this is happening.        Елена 3/3 Plus Instructions:     1.  The first hour after you place the sensor is the warm up period (black out period).  You will need to carry your blood glucose meter and check blood glucose during this time.     2.  Check blood sugar if feeling symptoms of hypoglycemia but meter is not displaying a low number- may be some variability between sensor and meter at times.     3.  Change sensor every 14 days or 15 days (3 Plus).     4.  Keep phone/reader near you, and look at glucose before each meal and at bedtime.     5.  Watch trend arrows- will let you know if blood sugars are rising, falling or stable at the time you check.     6.  Елена 3 has alarms (low, high, signal loss). You can adjust both the high and low blood glucose alarm (when they will go off) or turn off high blood glucose alarm if alarming too much.     You cannot turn off the critical low blood glucose alarm     7.  You can shower, bathe, and swim with sensor on. Do not get reader wet.     8.  Remove the sensor if you need to have an MRI or CT scan.     9.  Do not cover the sensor with extra adhesive (the small hole in the center of the sensor must remain uncovered), unless it is made for the Елена.  If you have trouble with sensor falling off, these are approved products to help with sensor adhesion: [employer]bot Skin Tac, SKIN-PREP Protective Barrier Wipe and Mastisol Liquid Adhesive     10.  Check the dose if you take a multivitamin or Vitamin C (ascorbic acid). You want  "to limit daily intake of ascorbic acid to 500 mg/day or less, or it may falsely raise sensor glucose readings. This is more of a concern if you are on insulin or medication that can cause low blood glucose as you may miss a severe low glucose event.         Main [employer] Елена website:   https://www.[place]/us-en/home.html    If you go to the support tab you can pick Елена 2 or 3.  From this page you can access instructions and how to videos.       Support:   If you have any trouble with use or if the sensor falls off early, call the customer service number for Елена located on the sensor's box. They can often help troubleshoot or replace sensor if needed. Keep your Елена sensor box with lot and/or serial numbers.    Website for елена replacement due to failure or falling off:   https://www.[place]/us-en/support/sensor-support-form-questions.html    Елена Customer Service: [phone]  (7 days a week 8am-8pm ET excluding holidays)     Discount Programs are available through [employer]:   MyFreestyle Program (https://www.[place]/us-en/myfreestyle.html)   To save on sensors, if told cost is more than $75: call [employer] Voucher line at [phone]     If you are using your phone you can connect and share your data with [facility] Endocrinology.    In the phone shavon go into menu --> connected apps --> [email or web address] -->connect to a practice -->enter practice ID --> [id number]    Freestyle Елена 3 or 3+    SENSOR BASICS:  Understand that your glucose sensor measures your glucose in your interstitial fluid and your blood sugar measures your glucose in your blood stream. The readings are not meant to be the same.        Your sensor glucose will lag behind your blood glucose.  \"Remember the roller coaster\".  Your blood sugar is always the front car and your sensor glucose is always the last car.  When blood sugar is moving up or down, the more difference there will be.          Take it easy " "while wearing the sensor.  Take extra care while bathing and getting dressed.  Wear loose fitting clothes on your sensor and try to avoid laying on your sensor.  You can shower/bathe with the sensor on.  But avoid submerging the sensor more than 3 feet for more than 30 minutes.  Gently pat to dry.    INITIAL SET UP:  Download the Елена 3 shavon if using your smartphone and create an account.  The shavon will walk you through set up.  If you are using the [employer] 3 , turn it on and follow instructions for set up.      Елена 3 shavon:       There will be a 60 minute warm up for each new sensor.  Each sensor should last 14 days.  Do not take more than 500mg of vitamin C (Елена 3+ more than 1000mg) per day or this can affect sensor accuracy.    The first 12 hours of every new sensor often a little \"off\" as it gets to know your body fluids.  Set glucose alarms for highs and lows per your preference or at the recommendations of your diabetes educator.  You will not be able to silence or turn off the urgent low alert at 54 mg/dL.  If an alarm goes off, go to your shavon and acknowledge it or you will continue to get alarmed every 5 minutes.  Your [employer] 3 shavon or  will notify you when it is time to change your sensor.  Just remove it like a band aid and throw it in the trash, then place a new sensor.  It is recommended to switch arms with every sensor.    DAILY ROUTINE:  Keep your phone or  within 20 feet of you to keep data communicating with your device.  If you are away from your device for more than 20 minutes, your device will alarm.  Be curious with what the sensor data shows.  How do your food choices impact your glucose?  Exercise?  Complete a fingerstick glucose check at these times:                          -when you feel differently than the sensor indicates                          -when you are not wearing a sensor                          -when you see this symbol:     DATA SHARING:  If you want to " "share your sensor data with a loved one (for phone users only), send them an invitation through the Елена 3 shavon.  They will use the PartSimple shavon and accept your invitation.      PartSimple shavon:   Our clinic will link to your data as well (phone users only). Under the menu (3 lines in the upper left corner), go to connected apps, then touch \"connect\" next to [employer], and then \"connect to a practice\".  Enter our practice ID \"[id number]\" and hit connect.  Patients using the reader can upload from home via desktop or laptop computer on Taplet or will have the  downloaded in clinic.     OTHER IMPORTANT NOTES:  If the sensor is often falling off before the 14 days are up, there are products than can help.  Talk to your diabetes educator.  You can leave your Елена sensor on for radiology scans (Xray, CT scan or MRI), however it is recommended that you use fingersticks for accurate readings during and 1 hour after an MRI as the accuracy of the sensor is questionable during this time.  Contact the  if the sensor does not last the full 14 days for any reason, or if you have any other technical problems. Keep your Елена sensor box with the lot and serial numbers as they often ask for this information.  -[employer] customer service phone number: [phone].      -Website for елена replacement due to sensor failure or falling off: https://www.Playthe.net.[employer]ott/us-en/support/sensor-support-form-questions.html  If your copay is more than $75 a month, call the copay assistance line at [phone] and they will work with your pharmacy to get your cost down.  Or, you can give the pharmacy the following information:      [id number], Group: [id number], Static IDERXLIBREHCP, EXP: 12/31/25     Taking Insulin:    1. Take NovoLog - 4 units at breakfast, lunch, and supper.     - Rotate injection sites, keeping at least 1 inch apart from last injection site and 2 inches away from belly button or surgical " "scars.    -If you have a cloudy insulin, mix the insulin gently by rolling between your hands 10 times and turning upside down and right side up 10 times. Do not shake.     2. Pen - Use a new pen needle for each injection. Always remove pen needle from the insulin pen after use and do not store insulin pens with the needle on the pen. Do a 2 unit \"prime\" before each injection, be sure a drop or stream of insulin comes out of the needle before you give your injection. After you inject, hold the needle under the skin to the count of 10 to be sure all of the insulin goes in.      3. Store insulin you are not using in the refrigerator (do not freeze). Take new insulin out of the refrigerator a few hours prior to use to bring to room temperature.     4. Once opened Novolog can be kept at room temperature for 28 days.. Do not use the opened insulin after this time has passed, even if there is still medicine inside.     5. Always carry your blood sugar meter and a sugar source like glucose tablets with you in case of a low blood sugar. Treat a low blood sugar (less than 70) with 15 grams of carbohydrate (1 carb choice). Wait 15 minutes, recheck blood sugar. If blood sugar is still below 70, repeat the treatment.    6. Wear Medical ID or carry a wallet card stating you have Diabetes.    7. Call your doctor or diabetes educator if you begin having low blood sugars or if you have questions or concerns.     8. Complete and mail in the form to inform the [place] Department [employer] that you have started taking insulin.    9. Follow-up: Follow-up diabetes education appointment scheduled on 2/17/25.      "

## 2025-02-17 ENCOUNTER — VIRTUAL VISIT (OUTPATIENT)
Dept: EDUCATION SERVICES | Facility: CLINIC | Age: 50
End: 2025-02-17
Payer: COMMERCIAL

## 2025-02-17 DIAGNOSIS — E11.3293 TYPE 2 DIABETES MELLITUS WITH BOTH EYES AFFECTED BY MILD NONPROLIFERATIVE RETINOPATHY WITHOUT MACULAR EDEMA, WITHOUT LONG-TERM CURRENT USE OF INSULIN (H): Primary | ICD-10-CM

## 2025-02-17 PROCEDURE — 99207 PR NONPHYSICIAN TELEPHONE ASSESSMENT 21-30 MIN: CPT | Mod: 95 | Performed by: DIETITIAN, REGISTERED

## 2025-02-17 NOTE — PROGRESS NOTES
Virtual Visit Details    Type of service:  Video Visit   Video Start Time:  8:28am  Video End Time:8:54 AM    Originating Location (pt. Location): Home  Distant Location (provider location):  Off-site  Platform used for Video Visit: Юлия    Diabetes Self-Management Education & Support    Jesus Alberto Denson presents today for education related to Type 2 diabetes    Patient is being treated with:  Oral Agents and MDI  She is accompanied by self     Year of diagnosis: 2009- not formally diagnosed until 2013/2014  Referring provider:  Jeancarlos Hicks  Living Situation: home  Employment: not discussed    PATIENT CONCERNS/REASON FOR REFERRAL My blood sugars are lower but still high      ASSESSMENT: Patient has been unable to receive the freestyle louisa 3, appears [facility] received the prescription, but it may have been transferred to the [place] [facility] pharmacy.  Patient reports having issues with [facility] for txt communication.  Has started the novolog with meals, but post meal readings remain elevated.  She is concerned about the neuropathy in her feet.  It doesn't bother her during the day, but prevents sleeping at night.  Given she has 7+ years of elevated A1cs likely this will not resolve with lower blood sugars alone.     Taking Medication:     Current Diabetes Management per Patient:  Taking diabetes medications? yes:     Diabetes Medication(s)       Biguanides       metFORMIN (GLUCOPHAGE XR) 500 MG 24 hr tablet Take 1 tablet (500 mg) in the morning and 2 tablets (1000 mg) in the evening for 7 days, next increase to 2 tablets (1000 mg) in the morning and 2 tablets (1000 mg) in the evening     metFORMIN (GLUCOPHAGE-XR) 500 MG 24 hr tablet TAKE 2 TABLETS (1,000 MG) BY MOUTH 2 TIMES DAILY (WITH MEALS)       Insulin       insulin aspart (NOVOLOG FLEXPEN) 100 UNIT/ML pen Inject 4 units before breakfast, 4 units before lunch, 4 units before dinner.     insulin glargine U-300 (TOUJEO SOLOSTAR) 300 UNIT/ML (1 units  dial) pen Inject 16 Units subcutaneously at bedtime - taking 20 units       Sodium-Glucose Co-Transporter 2 (SGLT2) Inhibitors       empagliflozin (JARDIANCE) 10 MG TABS tablet Take 1 tablet (10 mg) by mouth daily     empagliflozin (JARDIANCE) 25 MG TABS tablet Take 1 tablet (25 mg) by mouth daily.       Incretin Mimetic Agents       liraglutide (VICTOZA) 18 MG/3ML solution Inject 0.6 mg Subcutaneous daily for 7 days, THEN 1.2 mg daily for 7 days, THEN 1.8 mg daily.            Current TDD= 32u 0.55u/kg    Monitoring    Patient glucose self monitoring as follows: 3-5 times daily  CGM: Freestyle Елена 3- Ordered but patient has been unable to receive this from the pharmacy  BG results:      Fasting 160-160  After eating 270-240    After breakfast= 270 ( scrambled eggs 2 pc white bread)   Lunch- 220-250 (rice and stirfry)  Diner- 250-270 (rice and stirfry)     Lab Results   Component Value Date    A1C 14.2 01/14/2025    A1C >15.0 07/17/2024    A1C 11.4 08/24/2021    A1C 12.0 01/26/2021    A1C 11.7 12/20/2018    A1C 8.6 04/24/2018    A1C 11.3 02/07/2018    A1C 7.6 10/06/2013       Patient's A1C goal: <7.0    Activity: no regular exercise program    Healthy Eating:   See RD-CDE assessment found elsewhere in EMR     Problem Solving:      Patient is at risk of hypoglycemia?: Frequency is less than once a month  Hospitalizations for hyper or hypoglycemia: No    Healthy Coping and Stress Management:   Sources of stress identified by patient:  Other (please specify):  neuropathy at night bothers her sleeping    EDUCATION and INSTRUCTION PROVIDED AT THIS VISIT:    Discussed neuropathy due to current elevated blood sugars, but also discussed neuropathy due to long term exposure to elevated blood sugars over the most recent 7 years.      Discussed bedtime and overnight snack options.  Recommended testing blood sugar prior as she may need a snack but she may also need corrective Novolog.    Patient-stated goal written and given  to Jesus Alberto Denson.  Verbalized and demonstrated understanding of instructions.     PLAN:  Recommend increase to novolog 4-4-4-0 --> 7-7-7-0, if any lows post meal ok to decrease to 6u  Contact PCP regarding neuropathy in feet.     See patient instructions  AVS printed and given to patient    FOLLOW-UP:    [facility] 2/21/25- follow up insulin titration  Call out to  speciality regarding елена 3 rx- no active rx.    Called [facility] pharmacy and spoke with pharmacist- Елена 3 has been ready for  since 2/7/25- called patient to inform her to go .  She will  today.     Gabbi Arnett MS, RD, LD, CDE  Time spent with patient at today's visit was 26 minutes.        Any diabetes medication initiation or dose changes were made via the [facility] Standing Orders per the patient's referring provider. A copy of this encounter was shared with the provider.

## 2025-02-25 ENCOUNTER — VIRTUAL VISIT (OUTPATIENT)
Dept: EDUCATION SERVICES | Facility: CLINIC | Age: 50
End: 2025-02-25
Payer: COMMERCIAL

## 2025-02-25 DIAGNOSIS — E11.40 TYPE 2 DIABETES MELLITUS WITH DIABETIC NEUROPATHY, WITHOUT LONG-TERM CURRENT USE OF INSULIN (H): ICD-10-CM

## 2025-02-25 RX ORDER — INSULIN ASPART 100 [IU]/ML
INJECTION, SOLUTION INTRAVENOUS; SUBCUTANEOUS
Qty: 15 ML | Refills: 3 | Status: SHIPPED | OUTPATIENT
Start: 2025-02-25

## 2025-02-25 RX ORDER — INSULIN GLARGINE 300 U/ML
24 INJECTION, SOLUTION SUBCUTANEOUS AT BEDTIME
Qty: 4.5 ML | Refills: 0 | Status: SHIPPED | OUTPATIENT
Start: 2025-02-25

## 2025-02-25 NOTE — PROGRESS NOTES
Virtual Visit Details    Type of service:  Video Visit     Originating Location (pt. Location): Home  Distant Location (provider location):  Off-site  Platform used for Video Visit: Юлия

## 2025-02-25 NOTE — NURSING NOTE
Current patient location: [address] [place] [id number]    Is the patient currently in the state of [place]? YES    Visit mode: VIDEO    If the visit is dropped, the patient can be reconnected by:VIDEO VISIT: Text to cell phone:   Telephone Information:   Mobile [phone]       Will anyone else be joining the visit? NO  (If patient encounters technical issues they should call [phone]    Are changes needed to the allergy or medication list? No    Are refills needed on medications prescribed by this physician? NO    Rooming Documentation:  Not applicable    Reason for visit: Diabetes Education    Wood HUTCHINSON

## 2025-02-25 NOTE — PROGRESS NOTES
Diabetes Education Follow-up    Subjective/Objective:    Jesus Alberto Denson sent in blood glucose log for review. Last date of communication was: 2/21/25.    Diabetes is being managed with Diabetes Medications   Diabetes Medication(s)       Biguanides       metFORMIN (GLUCOPHAGE XR) 500 MG 24 hr tablet Take 1 tablet (500 mg) in the morning and 2 tablets (1000 mg) in the evening for 7 days, next increase to 2 tablets (1000 mg) in the morning and 2 tablets (1000 mg) in the evening     metFORMIN (GLUCOPHAGE-XR) 500 MG 24 hr tablet TAKE 2 TABLETS (1,000 MG) BY MOUTH 2 TIMES DAILY (WITH MEALS)       Insulin       insulin aspart (NOVOLOG FLEXPEN) 100 UNIT/ML pen Inject 4 units before breakfast, 4 units before lunch, 4 units before dinner.     insulin glargine U-300 (TOUJEO SOLOSTAR) 300 UNIT/ML (1 units dial) pen Inject 16 Units subcutaneously at bedtime       Sodium-Glucose Co-Transporter 2 (SGLT2) Inhibitors       empagliflozin (JARDIANCE) 10 MG TABS tablet Take 1 tablet (10 mg) by mouth daily     empagliflozin (JARDIANCE) 25 MG TABS tablet Take 1 tablet (25 mg) by mouth daily.       Incretin Mimetic Agents       liraglutide (VICTOZA) 18 MG/3ML solution Inject 0.6 mg Subcutaneous daily for 7 days, THEN 1.2 mg daily for 7 days, THEN 1.8 mg daily.            BG/Food Log:         Ate a small snack and an apple to treat low      Assessment:  2/24/25  B- Cereal (honey bunches of oats), milk  L-roast chicken, rice  D- boiled chicken, vegetables, rice 1/2 c  Hs snack- sandwich- took 4 units (white bread, turkey, lettuce, lay) water, no fruit      2/25/25  Breakfast- cinnamon bagel- no cream cheese, water      Plan/Response:  Recommend increase to novolog   9-9-11-0 if having a snack overnight have 4 units.  --> 12-9-9-0 --6 units with overnight snack  Toujeo- 0-0-0-20 --> 0-0-0-24     Gabbi Arnett MS, RD, LD, CDE  Start: 11:27am  End: 11:43am  Total time: 15 minutes    Any diabetes medication dose changes were made via the CDE  Protocol and Collaborative Practice Agreement with the patient's primary care provider and endocrinology provider. A copy of this encounter was shared with the provider.

## 2025-03-13 ENCOUNTER — OFFICE VISIT (OUTPATIENT)
Dept: URGENT CARE | Facility: URGENT CARE | Age: 50
End: 2025-03-13
Payer: COMMERCIAL

## 2025-03-13 VITALS
DIASTOLIC BLOOD PRESSURE: 84 MMHG | SYSTOLIC BLOOD PRESSURE: 127 MMHG | RESPIRATION RATE: 19 BRPM | HEART RATE: 92 BPM | WEIGHT: 128.2 LBS | BODY MASS INDEX: 24.63 KG/M2 | TEMPERATURE: 97.6 F | OXYGEN SATURATION: 99 %

## 2025-03-13 DIAGNOSIS — L30.9 DERMATITIS: Primary | ICD-10-CM

## 2025-03-13 RX ORDER — TRIAMCINOLONE ACETONIDE 1 MG/G
CREAM TOPICAL 2 TIMES DAILY
Qty: 80 G | Refills: 0 | Status: SHIPPED | OUTPATIENT
Start: 2025-03-13 | End: 2025-03-27

## 2025-03-13 ASSESSMENT — ENCOUNTER SYMPTOMS
CHILLS: 0
WEIGHT LOSS: 0

## 2025-03-14 NOTE — PROGRESS NOTES
Assessment & Plan       ICD-10-CM    1. Dermatitis  L30.9 triamcinolone (KENALOG) 0.1 % external cream     Adult Dermatology  Referral              Medical decision making:  Pt presents with rash to bilateral feet / ankles x past several weeks. Ddx includes dermatitis, flee bites, insect bite, cellulitis, folliculitis, lichen planus, among others. Only mildly itchy and no other family members have the rash so feel that insect / flee bites are unlikely. No signs of infection to necessitate antibiotics at this time. Will treat with triamcinolone and have follow up with dermatology if no improvement. Pt agrees with plan.     At the end of the encounter, I discussed results, diagnosis, medications. Discussed red flags for immediate return to clinic/ER, as well as indications for follow up if no improvement. Patient understood and agreed to plan. Patient was stable for discharge.    Subjective       History of Present Illness-  Jesus Alberto Denson, 49-year-old female  - Rash present for a couple of weeks, located on tops of feet and ankles. Mild itchiness. Did have a fever a few weeks ago but that resolved and has felt well since  - No similar rashes in household members  - Chronic tingling in feet from diabetic neuropathy, no new pain in feet.   - No recent travel       Review of Systems   Constitutional:  Negative for chills, malaise/fatigue and weight loss.   Skin:  Positive for itching and rash.       Problem List:  2021-02: Type 2 diabetes mellitus with both eyes affected by mild   nonproliferative retinopathy without macular edema, without long-term   current use of insulin (H)  2018-05: Endometrial thickening on ultra sound  2018-04: Renal hypertension  2018-03: Ureteral stone  2018-01: Calculus of kidney  2013-05: Anemia, iron deficiency  2013-04: Type 2 diabetes, HbA1c goal < 7% (H)  2013-04: Proteinuria  2013-03: Anemia  2013-03: Vitamin D deficiency  2013-03: Type 2 diabetes, uncontrolled, with renal  manifestation  2013-01: Labor and delivery, indication for care  2012-08: DM mellitus, gestational  2012-08: Obesity  2012-08: Advanced maternal age in pregnancy  2012-08: Grand multiparity with current pregnancy  2010-06: SUPERVISION OF HIGH-RISK PREGNANCY WITH GRAND MULTIPARITY  Hyperlipidemia with target LDL less than 100  CKD (chronic kidney disease) stage 1, GFR 90 ml/min or greater      Past Medical History:   Diagnosis Date    Anemia due to blood loss 03/26/2019    CKD (chronic kidney disease) stage 1, GFR 90 ml/min or greater     Hyperlipidemia     Mild nonproliferative diabetic retinopathy of both eyes without macular edema associated with type 2 diabetes mellitus (H)     Obesity 08/13/2012    Renal hypertension 04/24/2018    Renal stone     Type 2 diabetes, HbA1c goal < 7% (H) 03/11/2013    Vitamin D deficiency        Social History     Tobacco Use    Smoking status: Never    Smokeless tobacco: Never    Tobacco comments:     non-smoking household   Substance Use Topics    Alcohol use: No               OBJECTIVE:    Physical Exam  Constitutional:       General: She is not in acute distress.     Appearance: Normal appearance. She is not ill-appearing, toxic-appearing or diaphoretic.   Cardiovascular:      Rate and Rhythm: Normal rate.   Pulmonary:      Effort: Pulmonary effort is normal.   Skin:     General: Skin is warm.      Findings: Rash present. Rash is papular.      Comments: Scattered papules noted to bilateral feet and ankles.    Neurological:      Mental Status: She is alert.                 ETHAN VILLEGAS CNP

## 2025-03-14 NOTE — PATIENT INSTRUCTIONS
Try the ointment for the next few weeks and follow up with dermatology. I do not feel that this is infectious / contagious. If anything worsens please follow up with PCP or go to eR.

## 2025-03-22 DIAGNOSIS — E11.40 TYPE 2 DIABETES MELLITUS WITH DIABETIC NEUROPATHY, WITHOUT LONG-TERM CURRENT USE OF INSULIN (H): ICD-10-CM

## 2025-03-24 RX ORDER — INSULIN GLARGINE 300 [IU]/ML
INJECTION, SOLUTION SUBCUTANEOUS
Qty: 4.5 ML | Refills: 0 | Status: SHIPPED | OUTPATIENT
Start: 2025-03-24

## 2025-04-18 NOTE — PATIENT INSTRUCTIONS
Proper skin care from [facility]:    -Eliminate harsh soaps as they strip the natural oils from the skin, often resulting in dry itchy skin ( i.e. Dial, Zest, Mongolian Spring)  -Use mild soaps such as Cetaphil or Dove Sensitive Skin in the shower. You do not need to use soap on arms, legs, and trunk every time you shower unless visibly soiled.   -Avoid hot or cold showers.  -After showering, lightly dry off and apply moisturizing within 2-3 minutes. This will help trap moisture in the skin.   -Aggressive use of a moisturizer at least 1-2 times a day to the entire body (including -Vanicream, Cetaphil, Aquaphor or Cerave) and moisturize hands after every washing.  -We recommend using moisturizers that come in a tub that needs to be scooped out, not a pump. This has more of an oil base. It will hold moisture in your skin much better than a water base moisturizer. The above recommended are non-pore clogging.      Wear a sunscreen with at least SPF 30 on your face, ears, neck and V of the chest daily. Wear sunscreen on other areas of the body if those areas are exposed to the sun throughout the day. Sunscreens can contain physical and/or chemical blockers. Physical blockers are less likely to clog pores, these include zinc oxide and titanium dioxide. Reapply every two hour and after swimming.     Sunscreen examples: https://www.ewg.org/sunscreen/    UV radiation  UVA radiation remains constant throughout the day and throughout the year. It is a longer wavelength than UVB and therefore penetrates deeper into the skin leading to immediate and delayed tanning, photoaging, and skin cancer. 70-80% of UVA and UVB radiation occurs between the hours of 10am-2pm.  UVB radiation  UVB radiation causes the most harmful effects and is more significant during the summer months. However, snow and ice can reflect UVB radiation leading to skin damage during the winter months as well. UVB radiation is responsible for tanning,  burning, inflammation, delayed erythema (pinkness), pigmentation (brown spots), and skin cancer.     I recommend self monthly full body exams and yearly full body exams with a dermatology provider. If you develop a new or changing lesion please follow up for examination. Most skin cancers are pink and scaly or pink and pearly. However, we do see blue/brown/black skin cancers.  Consider the ABCDEs of melanoma when giving yourself your monthly full body exam ( don't forget the groin, buttocks, feet, toes, etc). A-asymmetry, B-borders, C-color, D-diameter, E-elevation or evolving. If you see any of these changes please follow up in clinic. If you cannot see your back I recommend purchasing a hand held mirror to use with a larger wall mirror.       Checking for Skin Cancer  You can find cancer early by checking your skin each month. There are 3 kinds of skin cancer. They are melanoma, basal cell carcinoma, and squamous cell carcinoma. Doing monthly skin checks is the best way to find new marks or skin changes. Follow the instructions below for checking your skin.   The ABCDEs of checking moles for melanoma   Check your moles or growths for signs of melanoma using ABCDE:   Asymmetry: the sides of the mole or growth don t match  Border: the edges are ragged, notched, or blurred  Color: the color within the mole or growth varies  Diameter: the mole or growth is larger than 6 mm (size of a pencil eraser)  Evolving: the size, shape, or color of the mole or growth is changing (evolving is not shown in the images below)    Checking for other types of skin cancer  Basal cell carcinoma or squamous cell carcinoma have symptoms such as:     A spot or mole that looks different from all other marks on your skin  Changes in how an area feels, such as itching, tenderness, or pain  Changes in the skin's surface, such as oozing, bleeding, or scaliness  A sore that does not heal  New swelling or redness beyond the border of a  mole    Who s at risk?  Anyone can get skin cancer. But you are at greater risk if you have:   Fair skin, light-colored hair, or light-colored eyes  Many moles or abnormal moles on your skin  A history of sunburns from sunlight or tanning beds  A family history of skin cancer  A history of exposure to radiation or chemicals  A weakened immune system  If you have had skin cancer in the past, you are at risk for recurring skin cancer.   How to check your skin  Do your monthly skin checkups in front of a full-length mirror. Check all parts of your body, including your:   Head (ears, face, neck, and scalp)  Torso (front, back, and sides)  Arms (tops, undersides, upper, and lower armpits)  Hands (palms, backs, and fingers, including under the nails)  Buttocks and genitals  Legs (front, back, and sides)  Feet (tops, soles, toes, including under the nails, and between toes)  If you have a lot of moles, take digital photos of them each month. Make sure to take photos both up close and from a distance. These can help you see if any moles change over time.   Most skin changes are not cancer. But if you see any changes in your skin, call your doctor right away. Only he or she can diagnose a problem. If you have skin cancer, seeing your doctor can be the first step toward getting the treatment that could save your life.   [employer] last reviewed this educational content on 4/1/2019 2000-2020 [employer]. [address] [place] [address], [place] [address]. All rights reserved. This information is not intended as a substitute for professional medical care. Always follow your healthcare professional's instructions.       When should I call my doctor?  If you are worsening or not improving, please, contact us or seek urgent care as noted below.     Who should I call with questions (adults)?    [facility] [phone]  For urgent needs outside of business hours call the [facility] at  [phone] and ask for the dermatology resident on call to be paged  If this is a medical emergency and you are unable to reach an ER, Call [phone]      If you need a prescription refill, please contact your pharmacy. Refills are approved or denied by our Physicians during normal business hours, Monday through Friday.  Per office policy, refills will not be granted if you have not been seen within the past year (or sooner depending on the condition).

## 2025-04-20 ENCOUNTER — HEALTH MAINTENANCE LETTER (OUTPATIENT)
Age: 50
End: 2025-04-20

## 2025-04-23 ENCOUNTER — OFFICE VISIT (OUTPATIENT)
Dept: DERMATOLOGY | Facility: CLINIC | Age: 50
End: 2025-04-23
Payer: COMMERCIAL

## 2025-04-23 DIAGNOSIS — L81.0 POST-INFLAMMATORY HYPERPIGMENTATION: ICD-10-CM

## 2025-04-23 NOTE — PROGRESS NOTES
[facility] Dermatology Note  Encounter Date: Apr 23, 2025  Office Visit     Reviewed patients past medical history and pertinent chart review prior to patients visit today.     Dermatology Problem List:    1.  Postinflammatory hyperpigmented macules, bilateral dorsal feet and lower shins, 4/23/2025    Personal Hx: no personal history of skin cancer  Family Hx: no family history of skin cancer    ____________________________________________    CC: Rash (Rash ( 2 months). Itchy then scratch and turned dark)    HPI:  Ms. Jesus Alberto Denson is a(n) 49 year old female who presents today as a new patient due to a concern of lesions.  The patient notes a history of lesions involving the bilateral dorsal feet and lower shins that presented about 2 months ago.  These were initially red and itchy.  She did follow-up with her primary care provider on 3/13/2025 and was prescribed triamcinolone 0.1% cream.  The patient applied the topical steroid for a about 1 week with improvement noted.  She states the lesions are now dark in color, but no longer red or itchy. She denies lesions elsewhere.    Patient is otherwise feeling well, without additional skin concerns.    Medications:  Current Outpatient Medications   Medication Sig Dispense Refill    aspirin 81 MG chewable tablet Take 1 tablet (81 mg) by mouth daily 108 tablet 3    atorvastatin (LIPITOR) 40 MG tablet Take 1 tablet (40 mg) by mouth daily 90 tablet 3    blood glucose (NO BRAND SPECIFIED) test strip Use to test blood sugar 3 times daily or as directed. To accompany: Blood Glucose Monitor Brands: per insurance. 100 strip 6    Continuous Glucose  (FREESTYLE ELAYNE 3 READER) KAVON 1 each continuously. 1 each 0    Continuous Glucose Sensor (FREESTYLE ELAYNE 3 PLUS SENSOR) MISC Change every 15 days. 6 each 1    empagliflozin (JARDIANCE) 25 MG TABS tablet Take 1 tablet (25 mg) by mouth daily. 90 tablet 2    insulin aspart (NOVOLOG FLEXPEN) 100 UNIT/ML  pen Inject 12 units before breakfast, 9 units before lunch, 9 units before dinner, 6 units with overnight snack. 15 mL 3    Insulin Glargine Solostar 300 UNIT/ML SOPN Inject 24 Units subcutaneously at bedtime. 4.5 mL 0    insulin pen needle (BD ANAT U/F) 32G X 4 MM miscellaneous Use 4 daily as directed. 300 each 3    liraglutide (VICTOZA) 18 MG/3ML solution Inject 0.6 mg Subcutaneous daily for 7 days, THEN 1.2 mg daily for 7 days, THEN 1.8 mg daily. 6 mL 3    lisinopril (ZESTRIL) 5 MG tablet Take 1 tablet (5 mg) by mouth daily. 90 tablet 3    metFORMIN (GLUCOPHAGE XR) 500 MG 24 hr tablet Take 1 tablet (500 mg) in the morning and 2 tablets (1000 mg) in the evening for 7 days, next increase to 2 tablets (1000 mg) in the morning and 2 tablets (1000 mg) in the evening 180 tablet 3    thin (NO BRAND SPECIFIED) lancets Use with lanceting device. To accompany: Blood Glucose Monitor Brands: per insurance. 100 each 6     No current facility-administered medications for this visit.      Past Medical History:   Patient Active Problem List   Diagnosis    Hyperlipidemia with target LDL less than 100    CKD (chronic kidney disease) stage 1, GFR 90 ml/min or greater    Type 2 diabetes, uncontrolled, with renal manifestation    Vitamin D deficiency    Renal hypertension    Endometrial thickening on ultra sound    Type 2 diabetes mellitus with both eyes affected by mild nonproliferative retinopathy without macular edema, without long-term current use of insulin (H)    Ureteral stone     Past Medical History:   Diagnosis Date    Anemia due to blood loss 03/26/2019    CKD (chronic kidney disease) stage 1, GFR 90 ml/min or greater     Hyperlipidemia     Mild nonproliferative diabetic retinopathy of 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   SKIN: The exam included bilateral lower shins and dorsal feet.  - Portillo IV.  - Bilateral dorsal feet and lower shins, scattered round well-demarcated postinflammatory hyperpigmented macules    - No other lesions of concern on areas examined.         _________________________________________    Assessment & Plan:   # Postinflammatory hyperpigmented macules, bilateral dorsal feet and lower shins  The itching and redness associated with these lesions resolved after the patient applied triamcinolone 0.1% cream for about a week.  She has since discontinued the topical medication.  Postinflammatory hyperpigmented macules are present upon examination today.  The patient is states these have been slightly fading.  They should continue to fade with time.  Differential diagnosis remains broad including insect bites (most likely?) versus lichen planus vs dermatitis vs other.  We briefly discussed a biopsy, but at this time it would likely only show postinflammatory hyperpigmentation.  I do recommend close continued observation.  The patient should send me a MyChart if the lesions would recur or flare again, at which time I would recommend reevaluation in person and likely a biopsy.  The patient was in agreement with the plan.    All risks, benefits and alternatives were discussed with patient.  Patient is in agreement and understands the assessment and plan.  All questions were answered.    PATIENCE [facility]    CC Tawnya Montana, ETHAN CNP  [address] [place] [address] on close of this encounter.

## 2025-04-23 NOTE — LETTER
4/23/2025      Jesus Alberto Denson  [address]  [place] [address]      Dear Colleague,    Thank you for referring your patient, Jesus Alberto Denson, to the [facility]. Please see a copy of my visit note below.    [facility] Dermatology Note  Encounter Date: Apr 23, 2025  Office Visit     Reviewed patients past medical history and pertinent chart review prior to patients visit today.     Dermatology Problem List:    1.  Postinflammatory hyperpigmented macules, bilateral dorsal feet and lower shins, 4/23/2025    Personal Hx: no personal history of skin cancer  Family Hx: no family history of skin cancer    ____________________________________________    CC: Rash (Rash ( 2 months). Itchy then scratch and turned dark)    HPI:  Ms. Jesus Alberto Denson is a(n) 49 year old female who presents today as a new patient due to a concern of lesions.  The patient notes a history of lesions involving the bilateral dorsal feet and lower shins that presented about 2 months ago.  These were initially red and itchy.  She did follow-up with her primary care provider on 3/13/2025 and was prescribed triamcinolone 0.1% cream.  The patient applied the topical steroid for a about 1 week with improvement noted.  She states the lesions are now dark in color, but no longer red or itchy. She denies lesions elsewhere.    Patient is otherwise feeling well, without additional skin concerns.    Medications:  Current Outpatient Medications   Medication Sig Dispense Refill     aspirin 81 MG chewable tablet Take 1 tablet (81 mg) by mouth daily 108 tablet 3     atorvastatin (LIPITOR) 40 MG tablet Take 1 tablet (40 mg) by mouth daily 90 tablet 3     blood glucose (NO BRAND SPECIFIED) test strip Use to test blood sugar 3 times daily or as directed. To accompany: Blood Glucose Monitor Brands: per insurance. 100 strip 6     Continuous Glucose  (FREESTYLE ELAYNE 3 READER) KAVON 1 each continuously. 1 each  0     Continuous Glucose Sensor (FREESTYLE ELAYNE 3 PLUS SENSOR) MISC Change every 15 days. 6 each 1     empagliflozin (JARDIANCE) 25 MG TABS tablet Take 1 tablet (25 mg) by mouth daily. 90 tablet 2     insulin aspart (NOVOLOG FLEXPEN) 100 UNIT/ML pen Inject 12 units before breakfast, 9 units before lunch, 9 units before dinner, 6 units with overnight snack. 15 mL 3     Insulin Glargine Solostar 300 UNIT/ML SOPN Inject 24 Units subcutaneously at bedtime. 4.5 mL 0     insulin pen needle (BD ANAT U/F) 32G X 4 MM miscellaneous Use 4 daily as directed. 300 each 3     liraglutide (VICTOZA) 18 MG/3ML solution Inject 0.6 mg Subcutaneous daily for 7 days, THEN 1.2 mg daily for 7 days, THEN 1.8 mg daily. 6 mL 3     lisinopril (ZESTRIL) 5 MG tablet Take 1 tablet (5 mg) by mouth daily. 90 tablet 3     metFORMIN (GLUCOPHAGE XR) 500 MG 24 hr tablet Take 1 tablet (500 mg) in the morning and 2 tablets (1000 mg) in the evening for 7 days, next increase to 2 tablets (1000 mg) in the morning and 2 tablets (1000 mg) in the evening 180 tablet 3     thin (NO BRAND SPECIFIED) lancets Use with lanceting device. To accompany: Blood Glucose Monitor Brands: per insurance. 100 each 6     No current facility-administered medications for this visit.      Past Medical History:   Patient Active Problem List   Diagnosis     Hyperlipidemia with target LDL less than 100     CKD (chronic kidney disease) stage 1, GFR 90 ml/min or greater     Type 2 diabetes, uncontrolled, with renal manifestation     Vitamin D deficiency     Renal hypertension     Endometrial thickening on ultra sound     Type 2 diabetes mellitus with both eyes affected by mild nonproliferative retinopathy without macular edema, without long-term current use of insulin (H)     Ureteral stone     Past Medical History:   Diagnosis Date     Anemia due to blood loss 03/26/2019     CKD (chronic kidney disease) stage 1, GFR 90 ml/min or greater      Hyperlipidemia      Mild nonproliferative  diabetic retinopathy of both eyes without macular edema associated with type 2 diabetes mellitus (H)      Obesity 08/13/2012     Renal hypertension 04/24/2018     Renal stone      Type 2 diabetes, HbA1c goal < 7% (H) 03/11/2013     Vitamin D deficiency        ____________________________________________     Physical Exam:  Vitals: There were no vitals taken for this visit.   SKIN: The exam included bilateral lower shins and dorsal feet.  - Portillo IV.  - Bilateral dorsal feet and lower shins, scattered round well-demarcated postinflammatory hyperpigmented macules    - No other lesions of concern on areas examined.         _________________________________________    Assessment & Plan:   # Postinflammatory hyperpigmented macules, bilateral dorsal feet and lower shins  The itching and redness associated with these lesions resolved after the patient applied triamcinolone 0.1% cream for about a week.  She has since discontinued the topical medication.  Postinflammatory hyperpigmented macules are present upon examination today.  The patient is states these have been slightly fading.  They should continue to fade with time.  Differential diagnosis remains broad including insect bites (most likely?) versus lichen planus vs dermatitis vs other.  We briefly discussed a biopsy, but at this time it would likely only show postinflammatory hyperpigmentation.  I do recommend close continued observation.  The patient should send me a MyChart if the lesions would recur or flare again, at which time I would recommend reevaluation in person and likely a biopsy.  The patient was in agreement with the plan.    All risks, benefits and alternatives were discussed with patient.  Patient is in agreement and understands the assessment and plan.  All questions were answered.    PATIENCE [facility]    CC Tawnya Montana, ETHAN CNP  [address] [place] [address] on close of this  encounter.      Again, thank you for allowing me to participate in the care of your patient.        Sincerely,        Sheila Serra PA-C    Electronically signed

## 2025-05-01 ENCOUNTER — TELEPHONE (OUTPATIENT)
Dept: ENDOCRINOLOGY | Facility: CLINIC | Age: 50
End: 2025-05-01
Payer: COMMERCIAL

## 2025-05-01 NOTE — TELEPHONE ENCOUNTER
Patient confirmed scheduled appointment:  Date: 05/14/25  Time: 9:30  Visit type: NEW DIABETES  Provider: Joseph  Location: [facility]  Testing/imaging: N/A  Additional notes: Scheduled per dominick Landeros on 5/1/2025 at 2:32 PM

## 2025-05-14 ENCOUNTER — OFFICE VISIT (OUTPATIENT)
Dept: ENDOCRINOLOGY | Facility: CLINIC | Age: 50
End: 2025-05-14
Attending: PHYSICIAN ASSISTANT
Payer: COMMERCIAL

## 2025-05-14 VITALS
DIASTOLIC BLOOD PRESSURE: 90 MMHG | SYSTOLIC BLOOD PRESSURE: 162 MMHG | HEIGHT: 60 IN | HEART RATE: 88 BPM | BODY MASS INDEX: 26.7 KG/M2 | OXYGEN SATURATION: 99 % | WEIGHT: 136 LBS

## 2025-05-14 DIAGNOSIS — N18.1 CKD (CHRONIC KIDNEY DISEASE) STAGE 1, GFR 90 ML/MIN OR GREATER: ICD-10-CM

## 2025-05-14 DIAGNOSIS — E11.40 TYPE 2 DIABETES MELLITUS WITH DIABETIC NEUROPATHY, WITHOUT LONG-TERM CURRENT USE OF INSULIN (H): ICD-10-CM

## 2025-05-14 DIAGNOSIS — E11.3293 TYPE 2 DIABETES MELLITUS WITH BOTH EYES AFFECTED BY MILD NONPROLIFERATIVE RETINOPATHY WITHOUT MACULAR EDEMA, WITHOUT LONG-TERM CURRENT USE OF INSULIN (H): Primary | ICD-10-CM

## 2025-05-14 DIAGNOSIS — E78.5 HYPERLIPIDEMIA WITH TARGET LDL LESS THAN 100: ICD-10-CM

## 2025-05-14 LAB
EST. AVERAGE GLUCOSE BLD GHB EST-MCNC: 235 MG/DL
HBA1C MFR BLD: 9.8 % (ref 0–5.6)

## 2025-05-14 PROCEDURE — 83036 HEMOGLOBIN GLYCOSYLATED A1C: CPT | Performed by: NURSE PRACTITIONER

## 2025-05-14 PROCEDURE — 36415 COLL VENOUS BLD VENIPUNCTURE: CPT | Performed by: NURSE PRACTITIONER

## 2025-05-14 RX ORDER — PEN NEEDLE, DIABETIC 32GX 5/32"
NEEDLE, DISPOSABLE MISCELLANEOUS
Qty: 300 EACH | Refills: 3 | Status: SHIPPED | OUTPATIENT
Start: 2025-05-14

## 2025-05-14 NOTE — PROGRESS NOTES
Diabetes Consult Note  May 14, 2025        Jesus Alberto Denson  is a 49 year old female with Type 2 Diabetes here to establish care. She also has a history of HLD, CKD stage 1, mild DR, Anemia due to blood loss, Renal hypertension, Renal stone, and Vitamin D deficiency.         HPI:  She was followed in the past by Dr. Grijalva, last seen in 2021.    Diabetes diagnosed approximately 10 years ago.    Pt has a history of gestational diabetes noted during her pregnancy in 2013, requiring insulin. She had not been diagnosed with pre-Dm or DM prior, although A1c in 2009 was 6.7 c/w DM. Patient was then treated with Metformin and Amaryl after delivery, and insulin restarted in 2014.     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 Eats 2-3 meals daily plus overnight snack.    Current Diabetes Medications:  Jardiance 25 mg   Glargine 24 units - Has only been taking 15 units due to fear of hypoglycemia  Novolog with meals, taking 14-16 units/meal  Metformin ER - Taking 2 tabs (1000 mg) Taking every other day due to diarrhea and abdominal pain, which she also experienced previously even on 500 mg daily.    Previously tried treatments:   Victoza - did not start, expensive  Amaryl - Not sure why stopped    Denies  side effects with Jardiance.    Previously seen by Gabbi Arnett, Diabetes Educator, last seen in 2/25/2025 and insulin was titrated at that visit - Glargine to 24 units and Novolog with meals to 12/9/9 plus 6 units with overnight snack.    We reviewed glucometer/CGMS data together.  It revealed:  TIR 11%  High 31%  Very High 58%  No lows.   GMI 9.8%  Overall pattern: Variability seen with meals but remaining above goal throughout the day.            History of hospitalizations for diabetes:  Never    Ability to sense low blood sugars:  Dizzy and weak in morning if low <70. Not occurred for a couple years.    History of Diabetes monitoring and complications/ prevention:  CAD: No  Last eye exam results: 7/16/2024, history  of mild DR, stable  Last dental exam: 2 years ago, due for check up  Neuropathy: +numbness/tingling/pain bilaterally to feet starting approx Dec 2024 and worsening, occurs especially at night. Gabapentin ordered last week by PCP for this.  Nephropathy: CKD Stage 1, significant albuminuria 5152 mg/dL 7/17/2024. On lisinopril.  HTN: Yes, no lisinopril yet today.  On statin: Yes atorvastatin 40 mg  Depression: No  Feet: No ulcers or lesions per patient.    Jamesee's PMH, PSH and allergies were reviewed today and pertinent information is summarized above.    Thanee's pertinent social and family history are also reviewed today and pertinent information is summarized above.    Current Outpatient Medications   Medication Sig Dispense Refill    aspirin 81 MG chewable tablet Take 1 tablet (81 mg) by mouth daily 108 tablet 3    atorvastatin (LIPITOR) 40 MG tablet Take 1 tablet (40 mg) by mouth daily 90 tablet 3    blood glucose (NO BRAND SPECIFIED) test strip Use to test blood sugar 3 times daily or as directed. To accompany: Blood Glucose Monitor Brands: per insurance. 100 strip 6    Continuous Glucose  (FREESTYLE ELAYNE 3 READER) KAVON 1 each continuously. 1 each 0    Continuous Glucose Sensor (FREESTYLE ELAYNE 3 PLUS SENSOR) MISC Change every 15 days. 6 each 1    empagliflozin (JARDIANCE) 25 MG TABS tablet Take 1 tablet (25 mg) by mouth daily. 90 tablet 2    gabapentin (NEURONTIN) 300 MG capsule Take 1 capsule (300 mg) by mouth nightly as needed for neuropathic pain. 90 capsule 0    insulin aspart (NOVOLOG FLEXPEN) 100 UNIT/ML pen Inject 12 units before breakfast, 9 units before lunch, 9 units before dinner, 6 units with overnight snack. 15 mL 3    Insulin Glargine Solostar 300 UNIT/ML SOPN Inject 24 Units subcutaneously at bedtime. 4.5 mL 0    insulin pen needle (BD ANAT U/F) 32G X 4 MM miscellaneous Use 4 daily as directed. 300 each 3    liraglutide (VICTOZA) 18 MG/3ML solution Inject 0.6 mg Subcutaneous daily for 7  "days, THEN 1.2 mg daily for 7 days, THEN 1.8 mg daily. 6 mL 3    lisinopril (ZESTRIL) 5 MG tablet Take 1 tablet (5 mg) by mouth daily. 90 tablet 3    metFORMIN (GLUCOPHAGE XR) 500 MG 24 hr tablet Take 1 tablet (500 mg) in the morning and 2 tablets (1000 mg) in the evening for 7 days, next increase to 2 tablets (1000 mg) in the morning and 2 tablets (1000 mg) in the evening 180 tablet 3    thin (NO BRAND SPECIFIED) lancets Use with lanceting device. To accompany: Blood Glucose Monitor Brands: per insurance. 100 each 6     No current facility-administered medications for this visit.         ROS:   Reports good physical activity tolerance.  Denies any pedal lesions or vision changes or concerns. Denies any other acute concerns except as noted above.      Exam:    BP (!) 162/90   Pulse 88   Ht 1.524 m (5')   Wt 61.7 kg (136 lb)   LMP 02/01/2025 (Within Weeks)   SpO2 99%   BMI 26.56 kg/m    Wt Readings from Last 10 Encounters:   05/14/25 61.7 kg (136 lb)   05/09/25 60.3 kg (133 lb)   03/13/25 58.2 kg (128 lb 3.2 oz)   01/14/25 58.1 kg (128 lb)   11/01/24 60.9 kg (134 lb 3.2 oz)   08/19/24 61.1 kg (134 lb 9.6 oz)   07/17/24 60.4 kg (133 lb 3.2 oz)   04/26/19 67.9 kg (149 lb 12.8 oz)   12/20/18 67.1 kg (148 lb)   12/05/18 66.7 kg (147 lb)     Estimated body mass index is 25.31 kg/m  as calculated from the following:    Height as of 5/9/25: 1.544 m (5' 0.79\").    Weight as of 5/9/25: 60.3 kg (133 lb).    Physical Exam:  General: Pleasant, well nourished and hydrated female in NAD.   Psych:  Mood is \"good,\" affect is appropriate.  Thought form and content are fluid and coherent.    HEENT: Eyes and sclera are clear. Extraocular movements are grossly intact without proptosis.    Neck: No masses or JVD are noted.    Resp: Easy and unlabored breathing.   Neuro: Alert and oriented, communicating clearly.   Ext: no swelling or edema.    Diabetic foot exam: normal DP and PT pulses, no trophic changes or ulcerative lesions, " "and normal sensory exam. Hyperpigmentation with healed rash to ankles.      Data:      Recent Labs   Lab Test 01/14/25  1501 08/19/24  0951 07/17/24  0937 08/24/21  0733 08/24/21  0733 01/26/21  1715 01/26/21  1709 04/26/19  1105 04/24/18  1040 02/14/18  0938   A1C 14.2*  --  >15.0*  --  11.4*   < > 12.0*  --    < >  --    TSH  --   --   --   --   --   --   --  2.86  --  3.41   * 132* 104*   < > 63  --  Cannot estimate LDL when triglyceride exceeds 400 mg/dL  68  --    < >  --    HDL 35* 36* 29*  --  33*   < > 31*  --   --   --    TRIG 438* 706* 1,067*  --  274*   < > 721*  --   --   --    CR  --  0.56 0.49*  --  0.57   < > 0.60  --    < >  --    MICROL  --   --  711.0  --  513   < >  --   --   --   --    AST  --   --  20  --   --   --  13  --   --   --    ALT  --   --  33  --   --   --  21  --   --   --     < > = values in this interval not displayed.     No results found for: \"CPEPT\", \"GADAB\", \"ISCAB\"  Cholesterol   Date Value Ref Range Status   01/14/2025 254 (H) <200 mg/dL Final   08/19/2024 303 (H) <200 mg/dL Final   01/26/2021 188 <200 mg/dL Final   02/07/2018 193 <200 mg/dL Final       Hemoglobin   Date Value Ref Range Status   04/26/2019 9.1 (L) 11.7 - 15.7 g/dL Final   ]      Most recent GFRs:  GFR Estimate   Date Value Ref Range Status   08/19/2024 >90 >60 mL/min/1.73m2 Final     Comment:     eGFR calculated using 2021 CKD-EPI equation.   07/17/2024 >90 >60 mL/min/1.73m2 Final     Comment:     eGFR calculated using 2021 CKD-EPI equation.   08/24/2021 >90 >60 mL/min/1.73m2 Final     Comment:     As of July 11, 2021, eGFR is calculated by the CKD-EPI creatinine equation, without race adjustment. eGFR can be influenced by muscle mass, exercise, and diet. The reported eGFR is an estimation only and is only applicable if the renal function is stable.   01/26/2021 >90 >60 mL/min/[1.73_m2] Final     Comment:     Non  GFR Calc  Starting 12/18/2018, serum creatinine based estimated GFR " "(eGFR) will be   calculated using the Chronic Kidney Disease Epidemiology Collaboration   (CKD-EPI) equation.     12/20/2018 >90 >60 mL/min/[1.73_m2] Final     Comment:     Non  GFR Calc  Starting 12/18/2018, serum creatinine based estimated GFR (eGFR) will be   calculated using the Chronic Kidney Disease Epidemiology Collaboration   (CKD-EPI) equation.     04/24/2018 >90 >60 mL/min/1.7m2 Final     Comment:     Non  GFR Calc     Lab Results   Component Value Date    A1C 9.8 05/14/2025    A1C 14.2 01/14/2025    A1C >15.0 07/17/2024    A1C 11.4 08/24/2021    A1C 12.0 01/26/2021    A1C 11.7 12/20/2018    A1C 8.6 04/24/2018    A1C 11.3 02/07/2018    A1C 7.6 10/06/2013         No results found for: \"CPEPT\", \"GADAB\", \"ISCAB\"  AST   Date Value Ref Range Status   07/17/2024 20 0 - 45 U/L Final   01/26/2021 13 0 - 45 U/L Final     Lab Results   Component Value Date    ALT 33 07/17/2024    ALT 21 01/26/2021             Assessment/Plan:    Type 2 Diabetes with hyperglycemia, with retinopathy, nephropathy and neuropathy, with long-term use of insulin  Blood glucose control: Not at goal of A1C <7. A1C 14.2 on 1/14/2025, recent CGM data and A1C today show improving control but well above goal, A1C 9.8%.     We made a plan to START Ozempic 0.25 mg subcutaneous once weekly. Denies history of pancreatitis and personal or family history of medullary thyroid cancer or MEN2. Reviewed potential GI side effects including nausea, constipation, bloating abdominal pain, and methods to decrease these including reduced food portions, eating slowly, and avoiding high-fat meals. She will notify me if these are very bothersome.    She has not been taking prescribed amount of glargine due to fear of hypoglycemia, and her resulting insulin regimen is very bolus heavy (15 units basal to approx 45 units bolus daily).   We made a plan to increase glargine to 18 units today and titrate 2 units every 3 days until she " reaches the FBG <150 each morning. Would target FBG  in the future.   Continue Novolog 14-16 units with meals and 6 units for nighttime snack. Take this medication 15 minutes before meals.  Continue Jardiance 25 mg once daily  STOP metformin (GI side effects)    Reminded to follow up with Diabetes Education.    DM Complications-   Retinopathy:  Yes, stable. Up to date with ophthalmology.   Nephropathy:  BP elevated today, has not taken lisinopril yet. Reminded to take when she returns home. +albuminuria.  Creatinine stable.   Neuropathy: Yes, gabapentin ordered last week by PCP.  Feet: OK, no ulcers or lesions.   Lipids: 40-74 yo and statin is reccomended. Patient is taking a statin.    Reminded to schedule check up with dentist.    2. Hyperlipidemia  Patient is taking a statin.  above goal 1/14/2025    3. CKD Stage 1  Significant albuminuria 5152 mg/dL 7/17/2024. On lisinopril. Creatinine stable.    Follow up 1 month.    74 minutes spent on the date of the encounter doing chart review, history and exam, documentation, education and counseling, as well as communication and coordination of care, and further activities as noted above.  This time excludes time spent reviewing CGM.    The longitudinal plan of care for the diagnosis(es)/condition(s) as documented were addressed during this visit. Due to the added complexity in care, I will continue to support Jamesnathanael in the subsequent management and with ongoing continuity of care.      It is my privilege to be involved in the care of the above patient.     Kristin Ruiz, ANNA, CNP

## 2025-05-14 NOTE — PATIENT INSTRUCTIONS
Nice to meet you today!    We made the following changes to medications today:  Start Ozempic 0.25 mg subcutaneous once weekly.    Increase glargine to 18 units once daily at night. Increase this medication by 2 units every 3 days until blood glucose is less than 150 when you wake up in the morning. Maximum dose 24 units at this time. If you have any low blood glucose less than 70, reduce to previous dose and let me know.    Continue Novolog 14-16 units with meals and 6 units for nighttime snack. Take this medication 15 minutes before meals.    Continue Jardiance 25 mg once daily    STOP metformin       Please follow up with Diabetes Education.    -Hypoglycemia (low glucose):   If glucose is less than 70 mg/dL, treat with 15g carb (4 glucose tablets), recheck glucose in 15 minutes.  If low again, repeat.   If glucose is less than 54 mg/dL, treat with 30g carb, recheck glucose in 15 minutes.  If low again, repeat.       Contact information:   If you have concerns, please send me a [facility] message or call the clinic at [phone].  For more urgent concerns, please call [phone] after hours/weekends and ask to speak with the endocrinologist on call.      Please let me know if you are having low blood sugars less than 70 or over 350 mg/dL.  Do not wait until your next appointment if this is happening.      Thank you for your partnership.  We look forward to getting to know you!

## 2025-05-14 NOTE — NURSING NOTE
Vital signs:      BP: (!) 162/90 Pulse: 88     SpO2: 99 %     Height: 152.4 cm (5') Weight: 61.7 kg (136 lb)  Estimated body mass index is 26.56 kg/m  as calculated from the following:    Height as of this encounter: 1.524 m (5').    Weight as of this encounter: 61.7 kg (136 lb).      Chief Complaint   Patient presents with    New Patient    Diabetes    NEUROPATHY

## 2025-05-14 NOTE — LETTER
5/14/2025      Jesus Alberto Denson  [address]  [place] [address]      Dear Colleague,    Thank you for referring your patient, Jesus Alberto Denson, to the [facility]. Please see a copy of my visit note below.                               Diabetes Consult Note  May 14, 2025        Jesus Alberto Denson  is a 49 year old female with Type 2 Diabetes here to establish care. She also has a history of HLD, CKD stage 1, mild DR, Anemia due to blood loss, Renal hypertension, Renal stone, and Vitamin D deficiency.         HPI:  She was followed in the past by Dr. Grijalva, last seen in 2021.    Diabetes diagnosed approximately 10 years ago.    Pt has a history of gestational diabetes noted during her pregnancy in 2013, requiring insulin. She had not been diagnosed with pre-Dm or DM prior, although A1c in 2009 was 6.7 c/w DM. Patient was then treated with Metformin and Amaryl after delivery, and insulin restarted in 2014.     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 Eats 2-3 meals daily plus overnight snack.    Current Diabetes Medications:  Jardiance 25 mg   Glargine 24 units - Has only been taking 15 units due to fear of hypoglycemia  Novolog with meals, taking 14-16 units/meal  Metformin ER - Taking 2 tabs (1000 mg) Taking every other day due to diarrhea and abdominal pain, which she also experienced previously even on 500 mg daily.    Previously tried treatments:   Victoza - did not start, expensive  Amaryl - Not sure why stopped    Denies  side effects with Jardiance.    Previously seen by Gabbi Arnett, Diabetes Educator, last seen in 2/25/2025 and insulin was titrated at that visit - Glargine to 24 units and Novolog with meals to 12/9/9 plus 6 units with overnight snack.    We reviewed glucometer/CGMS data together.  It revealed:  TIR 11%  High 31%  Very High 58%  No lows.   GMI 9.8%  Overall pattern: Variability seen with meals but remaining above goal throughout the day.            History of  hospitalizations for diabetes:  Never    Ability to sense low blood sugars:  Dizzy and weak in morning if low <70. Not occurred for a couple years.    History of Diabetes monitoring and complications/ prevention:  CAD: No  Last eye exam results: 7/16/2024, history of mild DR, stable  Last dental exam: 2 years ago, due for check up  Neuropathy: +numbness/tingling/pain bilaterally to feet starting approx Dec 2024 and worsening, occurs especially at night. Gabapentin ordered last week by PCP for this.  Nephropathy: CKD Stage 1, significant albuminuria 5152 mg/dL 7/17/2024. On lisinopril.  HTN: Yes, no lisinopril yet today.  On statin: Yes atorvastatin 40 mg  Depression: No  Feet: No ulcers or lesions per patient.    Thanee's PMH, PSH and allergies were reviewed today and pertinent information is summarized above.    Thanee's pertinent social and family history are also reviewed today and pertinent information is summarized above.    Current Outpatient Medications   Medication Sig Dispense Refill     aspirin 81 MG chewable tablet Take 1 tablet (81 mg) by mouth daily 108 tablet 3     atorvastatin (LIPITOR) 40 MG tablet Take 1 tablet (40 mg) by mouth daily 90 tablet 3     blood glucose (NO BRAND SPECIFIED) test strip Use to test blood sugar 3 times daily or as directed. To accompany: Blood Glucose Monitor Brands: per insurance. 100 strip 6     Continuous Glucose  (FREESTYLE ELAYNE 3 READER) KAVON 1 each continuously. 1 each 0     Continuous Glucose Sensor (FREESTYLE ELAYNE 3 PLUS SENSOR) MISC Change every 15 days. 6 each 1     empagliflozin (JARDIANCE) 25 MG TABS tablet Take 1 tablet (25 mg) by mouth daily. 90 tablet 2     gabapentin (NEURONTIN) 300 MG capsule Take 1 capsule (300 mg) by mouth nightly as needed for neuropathic pain. 90 capsule 0     insulin aspart (NOVOLOG FLEXPEN) 100 UNIT/ML pen Inject 12 units before breakfast, 9 units before lunch, 9 units before dinner, 6 units with overnight snack. 15 mL 3      "Insulin Glargine Solostar 300 UNIT/ML SOPN Inject 24 Units subcutaneously at bedtime. 4.5 mL 0     insulin pen needle (BD ANAT U/F) 32G X 4 MM miscellaneous Use 4 daily as directed. 300 each 3     liraglutide (VICTOZA) 18 MG/3ML solution Inject 0.6 mg Subcutaneous daily for 7 days, THEN 1.2 mg daily for 7 days, THEN 1.8 mg daily. 6 mL 3     lisinopril (ZESTRIL) 5 MG tablet Take 1 tablet (5 mg) by mouth daily. 90 tablet 3     metFORMIN (GLUCOPHAGE XR) 500 MG 24 hr tablet Take 1 tablet (500 mg) in the morning and 2 tablets (1000 mg) in the evening for 7 days, next increase to 2 tablets (1000 mg) in the morning and 2 tablets (1000 mg) in the evening 180 tablet 3     thin (NO BRAND SPECIFIED) lancets Use with lanceting device. To accompany: Blood Glucose Monitor Brands: per insurance. 100 each 6     No current facility-administered medications for this visit.         ROS:   Reports good physical activity tolerance.  Denies any pedal lesions or vision changes or concerns. Denies any other acute concerns except as noted above.      Exam:    BP (!) 162/90   Pulse 88   Ht 1.524 m (5')   Wt 61.7 kg (136 lb)   LMP 02/01/2025 (Within Weeks)   SpO2 99%   BMI 26.56 kg/m    Wt Readings from Last 10 Encounters:   05/14/25 61.7 kg (136 lb)   05/09/25 60.3 kg (133 lb)   03/13/25 58.2 kg (128 lb 3.2 oz)   01/14/25 58.1 kg (128 lb)   11/01/24 60.9 kg (134 lb 3.2 oz)   08/19/24 61.1 kg (134 lb 9.6 oz)   07/17/24 60.4 kg (133 lb 3.2 oz)   04/26/19 67.9 kg (149 lb 12.8 oz)   12/20/18 67.1 kg (148 lb)   12/05/18 66.7 kg (147 lb)     Estimated body mass index is 25.31 kg/m  as calculated from the following:    Height as of 5/9/25: 1.544 m (5' 0.79\").    Weight as of 5/9/25: 60.3 kg (133 lb).    Physical Exam:  General: Pleasant, well nourished and hydrated female in NAD.   Psych:  Mood is \"good,\" affect is appropriate.  Thought form and content are fluid and coherent.    HEENT: Eyes and sclera are clear. Extraocular movements are " "grossly intact without proptosis.    Neck: No masses or JVD are noted.    Resp: Easy and unlabored breathing.   Neuro: Alert and oriented, communicating clearly.   Ext: no swelling or edema.    Diabetic foot exam: normal DP and PT pulses, no trophic changes or ulcerative lesions, and normal sensory exam. Hyperpigmentation with healed rash to ankles.      Data:      Recent Labs   Lab Test 01/14/25  1501 08/19/24  0951 07/17/24  0937 08/24/21  0733 08/24/21  0733 01/26/21  1715 01/26/21  1709 04/26/19  1105 04/24/18  1040 02/14/18  0938   A1C 14.2*  --  >15.0*  --  11.4*   < > 12.0*  --    < >  --    TSH  --   --   --   --   --   --   --  2.86  --  3.41   * 132* 104*   < > 63  --  Cannot estimate LDL when triglyceride exceeds 400 mg/dL  68  --    < >  --    HDL 35* 36* 29*  --  33*   < > 31*  --   --   --    TRIG 438* 706* 1,067*  --  274*   < > 721*  --   --   --    CR  --  0.56 0.49*  --  0.57   < > 0.60  --    < >  --    MICROL  --   --  711.0  --  513   < >  --   --   --   --    AST  --   --  20  --   --   --  13  --   --   --    ALT  --   --  33  --   --   --  21  --   --   --     < > = values in this interval not displayed.     No results found for: \"CPEPT\", \"GADAB\", \"ISCAB\"  Cholesterol   Date Value Ref Range Status   01/14/2025 254 (H) <200 mg/dL Final   08/19/2024 303 (H) <200 mg/dL Final   01/26/2021 188 <200 mg/dL Final   02/07/2018 193 <200 mg/dL Final       Hemoglobin   Date Value Ref Range Status   04/26/2019 9.1 (L) 11.7 - 15.7 g/dL Final   ]      Most recent GFRs:  GFR Estimate   Date Value Ref Range Status   08/19/2024 >90 >60 mL/min/1.73m2 Final     Comment:     eGFR calculated using 2021 CKD-EPI equation.   07/17/2024 >90 >60 mL/min/1.73m2 Final     Comment:     eGFR calculated using 2021 CKD-EPI equation.   08/24/2021 >90 >60 mL/min/1.73m2 Final     Comment:     As of July 11, 2021, eGFR is calculated by the CKD-EPI creatinine equation, without race adjustment. eGFR can be influenced by " "muscle mass, exercise, and diet. The reported eGFR is an estimation only and is only applicable if the renal function is stable.   01/26/2021 >90 >60 mL/min/[1.73_m2] Final     Comment:     Non  GFR Calc  Starting 12/18/2018, serum creatinine based estimated GFR (eGFR) will be   calculated using the Chronic Kidney Disease Epidemiology Collaboration   (CKD-EPI) equation.     12/20/2018 >90 >60 mL/min/[1.73_m2] Final     Comment:     Non  GFR Calc  Starting 12/18/2018, serum creatinine based estimated GFR (eGFR) will be   calculated using the Chronic Kidney Disease Epidemiology Collaboration   (CKD-EPI) equation.     04/24/2018 >90 >60 mL/min/1.7m2 Final     Comment:     Non  GFR Calc     Lab Results   Component Value Date    A1C 9.8 05/14/2025    A1C 14.2 01/14/2025    A1C >15.0 07/17/2024    A1C 11.4 08/24/2021    A1C 12.0 01/26/2021    A1C 11.7 12/20/2018    A1C 8.6 04/24/2018    A1C 11.3 02/07/2018    A1C 7.6 10/06/2013         No results found for: \"CPEPT\", \"GADAB\", \"ISCAB\"  AST   Date Value Ref Range Status   07/17/2024 20 0 - 45 U/L Final   01/26/2021 13 0 - 45 U/L Final     Lab Results   Component Value Date    ALT 33 07/17/2024    ALT 21 01/26/2021             Assessment/Plan:    Type 2 Diabetes with hyperglycemia, with retinopathy, nephropathy and neuropathy, with long-term use of insulin  Blood glucose control: Not at goal of A1C <7. A1C 14.2 on 1/14/2025, recent CGM data and A1C today show improving control but well above goal, A1C 9.8%.     We made a plan to START Ozempic 0.25 mg subcutaneous once weekly. Denies history of pancreatitis and personal or family history of medullary thyroid cancer or MEN2. Reviewed potential GI side effects including nausea, constipation, bloating abdominal pain, and methods to decrease these including reduced food portions, eating slowly, and avoiding high-fat meals. She will notify me if these are very bothersome.    She has " not been taking prescribed amount of glargine due to fear of hypoglycemia, and her resulting insulin regimen is very bolus heavy (15 units basal to approx 45 units bolus daily).   We made a plan to increase glargine to 18 units today and titrate 2 units every 3 days until she reaches the FBG <150 each morning. Would target FBG  in the future.   Continue Novolog 14-16 units with meals and 6 units for nighttime snack. Take this medication 15 minutes before meals.  Continue Jardiance 25 mg once daily  STOP metformin (GI side effects)    Reminded to follow up with Diabetes Education.    DM Complications-   Retinopathy:  Yes, stable. Up to date with ophthalmology.   Nephropathy:  BP elevated today, has not taken lisinopril yet. Reminded to take when she returns home. +albuminuria.  Creatinine stable.   Neuropathy: Yes, gabapentin ordered last week by PCP.  Feet: OK, no ulcers or lesions.   Lipids: 40-74 yo and statin is reccomended. Patient is taking a statin.    Reminded to schedule check up with dentist.    2. Hyperlipidemia  Patient is taking a statin.  above goal 1/14/2025    3. CKD Stage 1  Significant albuminuria 5152 mg/dL 7/17/2024. On lisinopril. Creatinine stable.    Follow up 1 month.    74 minutes spent on the date of the encounter doing chart review, history and exam, documentation, education and counseling, as well as communication and coordination of care, and further activities as noted above.  This time excludes time spent reviewing CGM.    The longitudinal plan of care for the diagnosis(es)/condition(s) as documented were addressed during this visit. Due to the added complexity in care, I will continue to support Jamesnathanael in the subsequent management and with ongoing continuity of care.      It is my privilege to be involved in the care of the above patient.     Kristin Ruiz, ANNA, CNP      Again, thank you for allowing me to participate in the care of your patient.         Sincerely,        ETHAN Null CNP    Electronically signed

## 2025-05-19 DIAGNOSIS — E11.40 TYPE 2 DIABETES MELLITUS WITH DIABETIC NEUROPATHY, WITHOUT LONG-TERM CURRENT USE OF INSULIN (H): ICD-10-CM

## 2025-05-20 RX ORDER — INSULIN GLARGINE 300 [IU]/ML
INJECTION, SOLUTION SUBCUTANEOUS
Qty: 4.5 ML | Refills: 0 | Status: SHIPPED | OUTPATIENT
Start: 2025-05-20

## 2025-06-03 ENCOUNTER — PATIENT OUTREACH (OUTPATIENT)
Dept: CARE COORDINATION | Facility: CLINIC | Age: 50
End: 2025-06-03
Payer: COMMERCIAL

## 2025-07-08 ENCOUNTER — OFFICE VISIT (OUTPATIENT)
Dept: ENDOCRINOLOGY | Facility: CLINIC | Age: 50
End: 2025-07-08
Payer: COMMERCIAL

## 2025-07-08 VITALS
WEIGHT: 135.6 LBS | OXYGEN SATURATION: 97 % | DIASTOLIC BLOOD PRESSURE: 62 MMHG | BODY MASS INDEX: 26.48 KG/M2 | HEART RATE: 90 BPM | SYSTOLIC BLOOD PRESSURE: 118 MMHG

## 2025-07-08 DIAGNOSIS — E11.40 TYPE 2 DIABETES MELLITUS WITH DIABETIC NEUROPATHY, WITHOUT LONG-TERM CURRENT USE OF INSULIN (H): ICD-10-CM

## 2025-07-08 PROCEDURE — 95251 CONT GLUC MNTR ANALYSIS I&R: CPT | Performed by: NURSE PRACTITIONER

## 2025-07-08 PROCEDURE — 3078F DIAST BP <80 MM HG: CPT | Performed by: NURSE PRACTITIONER

## 2025-07-08 PROCEDURE — 99215 OFFICE O/P EST HI 40 MIN: CPT | Performed by: NURSE PRACTITIONER

## 2025-07-08 PROCEDURE — 3074F SYST BP LT 130 MM HG: CPT | Performed by: NURSE PRACTITIONER

## 2025-07-08 RX ORDER — HYDROCHLOROTHIAZIDE 12.5 MG/1
CAPSULE ORAL
Qty: 6 EACH | Refills: 1 | Status: SHIPPED | OUTPATIENT
Start: 2025-07-08

## 2025-07-08 RX ORDER — INSULIN GLARGINE 300 [IU]/ML
20 INJECTION, SOLUTION SUBCUTANEOUS AT BEDTIME
Qty: 4.5 ML | Refills: 3 | Status: SHIPPED | OUTPATIENT
Start: 2025-07-08

## 2025-07-08 RX ORDER — INSULIN ASPART 100 [IU]/ML
INJECTION, SOLUTION INTRAVENOUS; SUBCUTANEOUS
Qty: 15 ML | Refills: 3 | Status: SHIPPED | OUTPATIENT
Start: 2025-07-08

## 2025-07-08 RX ORDER — PEN NEEDLE, DIABETIC 32GX 5/32"
NEEDLE, DISPOSABLE MISCELLANEOUS
Qty: 200 EACH | Refills: 11 | Status: SHIPPED | OUTPATIENT
Start: 2025-07-08

## 2025-07-08 NOTE — LETTER
7/8/2025      Jesus Alberto Denson  [address]  [place] [address]      Dear Colleague,    Thank you for referring your patient, Jesus Alberto Denson, to the [facility]. Please see a copy of my visit note below.             Diabetes Consult Note  July 8, 2025      Jesus Alberto Denson  is a 49 year old female with Type 2 Diabetes here for follow up. She also has a history of HLD, CKD stage 1, mild DR, Anemia due to blood loss, Renal hypertension, Renal stone, and Vitamin D deficiency.         HPI:   Follow up, last seen by me 5/14/2025.   She was followed in the past by Dr. Grijalva, last seen in 2021.   Diabetes diagnosed approximately 10 years ago.   Pt has a history of gestational diabetes noted during her pregnancy in 2013, requiring insulin. She had not been diagnosed with pre-Dm or DM prior, although A1c in 2009 was 6.7 c/w DM. Patient was then treated with Metformin and Amaryl after delivery, and insulin restarted in 2014.     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 Eats 2-3 meals daily plus overnight snack.     Previously seen by Gabbi Arnett, Diabetes Educator, last seen in 2/25/2025 and insulin was titrated at that visit - Glargine to 24 units and Novolog with meals to 12/9/9 plus 6 units with overnight snack.     At last visit 5/14 pt experiencing significant hyperglycemia. She had expressed fear of hypoglycemia with taking full dose of glargine. We made the plan of starting Ozempic 0.25 mg subcutaneous once weekly, and starting glargine 18 units and titrating up. We continued Jardiance and stopped Metformin due to GI side effects with even 500 mg daily.    Today - Pt reports she has been out of Novolog for 5 days. Started Ozempic 0.25, experienced nausea and bloating, this is improving. She has been taking Glargine 20 units every bedtime but sometimes forgets doses. Jardiance she has been taking every-other-day due to frequent urination and some vaginal itching with this  medication.      Current Diabetes Medications:   Jardiance 25 mg - taking every other day.  Glargine 20 units   Novolog with meals, taking 15-20 units/meal  - out for the past few days.  Ozempic 0.25 mg subcutaneous once weekly.     Previously tried treatments:   Victoza - did not start, expensive   Amaryl - Not sure why stopped   Metformin (GI side effects).       We reviewed glucometer/CGMS data together.  It revealed:   TIR 8%   High 32%   Very High 60%   No lows.   GMI 9.7%   Overall pattern: Variability seen with Novolog doses given at meals but remaining above goal throughout the day.            History of hospitalizations for diabetes:   Never     Ability to sense low blood sugars:   Dizzy and weak in morning if low <70. Not occurred for a couple years.     History of Diabetes monitoring and complications/ prevention:   CAD: No   Last eye exam results: 7/16/2024, history of mild DR, stable   Last dental exam: 2 years ago, due for check up   Neuropathy: +numbness/tingling/pain bilaterally to feet starting approx Dec 2024 and worsening, occurs especially at night. Taking Gabapentin for this.   Nephropathy: CKD Stage 1, significant albuminuria 5152 mg/dL 7/17/2024. On lisinopril.   HTN: Yes, on lisinopril.   On statin: Yes atorvastatin 40 mg   Depression: No   Feet: No ulcers or lesions per patient.     [facility] and allergies were reviewed today and pertinent information is summarized above.    [facility]'s pertinent social and family history are also reviewed today and pertinent information is summarized above.    Current Outpatient Medications   Medication Sig Dispense Refill     aspirin 81 MG chewable tablet Take 1 tablet (81 mg) by mouth daily (Patient not taking: Reported on 5/14/2025) 108 tablet 3     atorvastatin (LIPITOR) 40 MG tablet Take 1 tablet (40 mg) by mouth daily 90 tablet 3     blood glucose (NO BRAND SPECIFIED) test strip Use to test blood sugar 3 times daily or as directed. To accompany:  Blood Glucose Monitor Brands: per insurance. 100 strip 6     Continuous Glucose  (FREESTYLE ELAYNE 3 READER) KAVON 1 each continuously. 1 each 0     Continuous Glucose Sensor (FREESTYLE ELAYNE 3 PLUS SENSOR) MISC Change every 15 days. 6 each 1     empagliflozin (JARDIANCE) 25 MG TABS tablet Take 1 tablet (25 mg) by mouth daily. 90 tablet 2     gabapentin (NEURONTIN) 300 MG capsule Take 1 capsule (300 mg) by mouth nightly as needed for neuropathic pain. 90 capsule 0     insulin aspart (NOVOLOG FLEXPEN) 100 UNIT/ML pen Inject 12 units before breakfast, 9 units before lunch, 9 units before dinner, 6 units with overnight snack. 15 mL 3     Insulin Glargine Solostar 300 UNIT/ML SOPN Inject 24 Units subcutaneously at bedtime. 4.5 mL 0     insulin pen needle (BD ANAT U/F) 32G X 4 MM miscellaneous Use 4 daily as directed. 300 each 3     liraglutide (VICTOZA) 18 MG/3ML solution Inject 0.6 mg Subcutaneous daily for 7 days, THEN 1.2 mg daily for 7 days, THEN 1.8 mg daily. (Patient not taking: No sig reported) 6 mL 3     lisinopril (ZESTRIL) 5 MG tablet Take 1 tablet (5 mg) by mouth daily. 90 tablet 3     thin (NO BRAND SPECIFIED) lancets Use with lanceting device. To accompany: Blood Glucose Monitor Brands: per insurance. 100 each 6     No current facility-administered medications for this visit.         ROS:   Reports good physical activity tolerance.  Denies any pedal lesions or vision changes or concerns. Denies any other acute concerns except as noted above.      Exam:    [facility] 02/01/2025 (Within Weeks)   Wt Readings from Last 10 Encounters:   05/14/25 61.7 kg (136 lb)   05/09/25 60.3 kg (133 lb)   03/13/25 58.2 kg (128 lb 3.2 oz)   01/14/25 58.1 kg (128 lb)   11/01/24 60.9 kg (134 lb 3.2 oz)   08/19/24 61.1 kg (134 lb 9.6 oz)   07/17/24 60.4 kg (133 lb 3.2 oz)   04/26/19 67.9 kg (149 lb 12.8 oz)   12/20/18 67.1 kg (148 lb)   12/05/18 66.7 kg (147 lb)     Estimated body mass index is 26.56 kg/m  as calculated from the  "following:    Height as of 5/14/25: 1.524 m (5').    Weight as of 5/14/25: 61.7 kg (136 lb).    Physical Exam:  General: Pleasant, well nourished and hydrated female in NAD.   Psych:  Mood is \"good,\" affect is appropriate.  Thought form and content are fluid and coherent.    HEENT: Eyes and sclera are clear. Extraocular movements are grossly intact without proptosis.    Neck: No masses or JVD are noted.    Resp: Easy and unlabored breathing.   Neuro: Alert and oriented, communicating clearly.   Ext: no swelling or edema.        Data:      Recent Labs   Lab Test 05/14/25  1044 01/14/25  1501 08/19/24  0951 07/17/24  0937 08/24/21  0733 08/24/21  0733 01/26/21  1715 01/26/21  1709 04/26/19  1105 04/24/18  1040 02/14/18  0938   A1C 9.8* 14.2*  --  >15.0*  --  11.4*   < > 12.0*  --    < >  --    TSH  --   --   --   --   --   --   --   --  2.86  --  3.41   LDL  --  154* 132* 104*   < > 63  --  Cannot estimate LDL when triglyceride exceeds 400 mg/dL  68  --    < >  --    HDL  --  35* 36* 29*  --  33*   < > 31*  --   --   --    TRIG  --  438* 706* 1,067*  --  274*   < > 721*  --   --   --    CR  --   --  0.56 0.49*  --  0.57   < > 0.60  --    < >  --    MICROL  --   --   --  711.0  --  513   < >  --   --   --   --    AST  --   --   --  20  --   --   --  13  --   --   --    ALT  --   --   --  33  --   --   --  21  --   --   --     < > = values in this interval not displayed.     No results found for: \"CPEPT\", \"GADAB\", \"ISCAB\"  Cholesterol   Date Value Ref Range Status   01/14/2025 254 (H) <200 mg/dL Final   08/19/2024 303 (H) <200 mg/dL Final   01/26/2021 188 <200 mg/dL Final   02/07/2018 193 <200 mg/dL Final       Hemoglobin   Date Value Ref Range Status   04/26/2019 9.1 (L) 11.7 - 15.7 g/dL Final   ]      Most recent GFRs:  GFR Estimate   Date Value Ref Range Status   08/19/2024 >90 >60 mL/min/1.73m2 Final     Comment:     eGFR calculated using 2021 CKD-EPI equation.   07/17/2024 >90 >60 mL/min/1.73m2 Final     Comment: " "    eGFR calculated using 2021 CKD-EPI equation.   08/24/2021 >90 >60 mL/min/1.73m2 Final     Comment:     As of July 11, 2021, eGFR is calculated by the CKD-EPI creatinine equation, without race adjustment. eGFR can be influenced by muscle mass, exercise, and diet. The reported eGFR is an estimation only and is only applicable if the renal function is stable.   01/26/2021 >90 >60 mL/min/[1.73_m2] Final     Comment:     Non  GFR Calc  Starting 12/18/2018, serum creatinine based estimated GFR (eGFR) will be   calculated using the Chronic Kidney Disease Epidemiology Collaboration   (CKD-EPI) equation.     12/20/2018 >90 >60 mL/min/[1.73_m2] Final     Comment:     Non  GFR Calc  Starting 12/18/2018, serum creatinine based estimated GFR (eGFR) will be   calculated using the Chronic Kidney Disease Epidemiology Collaboration   (CKD-EPI) equation.     04/24/2018 >90 >60 mL/min/1.7m2 Final     Comment:     Non  GFR Calc     Lab Results   Component Value Date    A1C 9.8 05/14/2025    A1C 14.2 01/14/2025    A1C >15.0 07/17/2024    A1C 11.4 08/24/2021    A1C 12.0 01/26/2021    A1C 11.7 12/20/2018    A1C 8.6 04/24/2018    A1C 11.3 02/07/2018    A1C 7.6 10/06/2013         No results found for: \"CPEPT\", \"GADAB\", \"ISCAB\"  AST   Date Value Ref Range Status   07/17/2024 20 0 - 45 U/L Final   01/26/2021 13 0 - 45 U/L Final     Lab Results   Component Value Date    ALT 33 07/17/2024    ALT 21 01/26/2021             Assessment/Plan:    Type 2 Diabetes with hyperglycemia, with retinopathy, nephropathy and neuropathy, with long-term use of insulin  Blood glucose control: Not at goal of A1C <7. A1C 9.8 on 5/14/2025, recent CGM data show blood glucose above goal.     We made the following plan today (instructions provided to patient):  CONTINUE Glargine Solostar u300 20 units once daily  DECREASE Novolog with meals, taking 12-15 units/meal and 6 units with overnight snack.  DECREASE Jardiance " to 10 mg once daily  Take Ozempic 0.25 mg once weekly today,   then next Monday 7/14 INCREASE Ozempic to 0.5 mg once weekly. Continue this medication on Mondays.  Try to set alarms on your phone to remind you to take the glargine once daily insulin and the Ozempic once weekly medication.    Diabetes Education referral sent.  Counseled to notify me if GI side effects are very bothersome with increased Ozempic dose.    Trial of lower Jardiance dose to see if better tolerated, if not would consider discontinuing.    Reviewed with patient the importance of taking medications regularly, and finding a regimen that she is comfortable taking daily. Plan to optimize Ozempic with titration as tolerated and decrease insulin as able. Patient would benefit from simplified regimen if Ozempic titration is tolerated.      DM Complications-   Retinopathy:  Yes, stable. Up to date with ophthalmology.   Nephropathy:  BP elevated today, has not taken lisinopril yet. Reminded to take when she returns home. +albuminuria.  Creatinine stable.   Neuropathy: Yes, gabapentin ordered last week by PCP.  Feet: OK, no ulcers or lesions.   Lipids: 40-74 yo and statin is reccomended. Patient is taking a statin.    Reminded to schedule check up with dentist.    2. Hyperlipidemia  Patient is taking a statin.  above goal 1/14/2025    3. CKD Stage 1  Significant albuminuria 5152 mg/dL 7/17/2024. On lisinopril. Creatinine stable.    Follow up 1 month.    45 minutes spent on the date of the encounter doing chart review, history and exam, documentation, education and counseling, as well as communication and coordination of care, and further activities as noted above.  This time excludes time spent reviewing CGM.        It is my privilege to be involved in the care of the above patient.     ANNA Null, CNP      Jesus Alberto Denson was evaluated in a specialty clinic today at which time her blood pressure was noted to be elevated.  She  has  been advised to follow up with her primary provider or with a nurse only visit. We are also routing this message to her primary provider as an FYI.                      Again, thank you for allowing me to participate in the care of your patient.        Sincerely,        ETHAN Null CNP    Electronically signed

## 2025-07-08 NOTE — PROGRESS NOTES
Diabetes Consult Note  July 8, 2025      Jesus Alberto Denson  is a 49 year old female with Type 2 Diabetes here for follow up. She also has a history of HLD, CKD stage 1, mild DR, Anemia due to blood loss, Renal hypertension, Renal stone, and Vitamin D deficiency.         HPI:   Follow up, last seen by me 5/14/2025.   She was followed in the past by Dr. Grijalva, last seen in 2021.   Diabetes diagnosed approximately 10 years ago.   Pt has a history of gestational diabetes noted during her pregnancy in 2013, requiring insulin. She had not been diagnosed with pre-Dm or DM prior, although A1c in 2009 was 6.7 c/w DM. Patient was then treated with Metformin and Amaryl after delivery, and insulin restarted in 2014.     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 Eats 2-3 meals daily plus overnight snack.     Previously seen by Gabbi Arnett, Diabetes Educator, last seen in 2/25/2025 and insulin was titrated at that visit - Glargine to 24 units and Novolog with meals to 12/9/9 plus 6 units with overnight snack.     At last visit 5/14 pt experiencing significant hyperglycemia. She had expressed fear of hypoglycemia with taking full dose of glargine. We made the plan of starting Ozempic 0.25 mg subcutaneous once weekly, and starting glargine 18 units and titrating up. We continued Jardiance and stopped Metformin due to GI side effects with even 500 mg daily.    Today - Pt reports she has been out of Novolog for 5 days. Started Ozempic 0.25, experienced nausea and bloating, this is improving. She has been taking Glargine 20 units every bedtime but sometimes forgets doses. Jardiance she has been taking every-other-day due to frequent urination and some vaginal itching with this medication.      Current Diabetes Medications:   Jardiance 25 mg - taking every other day.  Glargine 20 units   Novolog with meals, taking 15-20 units/meal  - out for the past few days.  Ozempic 0.25 mg subcutaneous once weekly.     Previously tried  treatments:   Victoza - did not start, expensive   Amaryl - Not sure why stopped   Metformin (GI side effects).       We reviewed glucometer/CGMS data together.  It revealed:   TIR 8%   High 32%   Very High 60%   No lows.   GMI 9.7%   Overall pattern: Variability seen with Novolog doses given at meals but remaining above goal throughout the day.            History of hospitalizations for diabetes:   Never     Ability to sense low blood sugars:   Dizzy and weak in morning if low <70. Not occurred for a couple years.     History of Diabetes monitoring and complications/ prevention:   CAD: No   Last eye exam results: 7/16/2024, history of mild DR, stable   Last dental exam: 2 years ago, due for check up   Neuropathy: +numbness/tingling/pain bilaterally to feet starting approx Dec 2024 and worsening, occurs especially at night. Taking Gabapentin for this.   Nephropathy: CKD Stage 1, significant albuminuria 5152 mg/dL 7/17/2024. On lisinopril.   HTN: Yes, on lisinopril.   On statin: Yes atorvastatin 40 mg   Depression: No   Feet: No ulcers or lesions per patient.     [facility]'s PMH, PSH and allergies were reviewed today and pertinent information is summarized above.    [facility]'s pertinent social and family history are also reviewed today and pertinent information is summarized above.    Current Outpatient Medications   Medication Sig Dispense Refill    aspirin 81 MG chewable tablet Take 1 tablet (81 mg) by mouth daily (Patient not taking: Reported on 5/14/2025) 108 tablet 3    atorvastatin (LIPITOR) 40 MG tablet Take 1 tablet (40 mg) by mouth daily 90 tablet 3    blood glucose (NO BRAND SPECIFIED) test strip Use to test blood sugar 3 times daily or as directed. To accompany: Blood Glucose Monitor Brands: per insurance. 100 strip 6    Continuous Glucose  (FREESTYLE ELAYNE 3 READER) KAVON 1 each continuously. 1 each 0    Continuous Glucose Sensor (FREESTYLE ELAYNE 3 PLUS SENSOR) MISC Change every 15 days. 6 each 1     "empagliflozin (JARDIANCE) 25 MG TABS tablet Take 1 tablet (25 mg) by mouth daily. 90 tablet 2    gabapentin (NEURONTIN) 300 MG capsule Take 1 capsule (300 mg) by mouth nightly as needed for neuropathic pain. 90 capsule 0    insulin aspart (NOVOLOG FLEXPEN) 100 UNIT/ML pen Inject 12 units before breakfast, 9 units before lunch, 9 units before dinner, 6 units with overnight snack. 15 mL 3    Insulin Glargine Solostar 300 UNIT/ML SOPN Inject 24 Units subcutaneously at bedtime. 4.5 mL 0    insulin pen needle (BD ANAT U/F) 32G X 4 MM miscellaneous Use 4 daily as directed. 300 each 3    liraglutide (VICTOZA) 18 MG/3ML solution Inject 0.6 mg Subcutaneous daily for 7 days, THEN 1.2 mg daily for 7 days, THEN 1.8 mg daily. (Patient not taking: No sig reported) 6 mL 3    lisinopril (ZESTRIL) 5 MG tablet Take 1 tablet (5 mg) by mouth daily. 90 tablet 3    thin (NO BRAND SPECIFIED) lancets Use with lanceting device. To accompany: Blood Glucose Monitor Brands: per insurance. 100 each 6     No current facility-administered medications for this visit.         ROS:   Reports good physical activity tolerance.  Denies any pedal lesions or vision changes or concerns. Denies any other acute concerns except as noted above.      Exam:    [facility] 02/01/2025 (Within Weeks)   Wt Readings from Last 10 Encounters:   05/14/25 61.7 kg (136 lb)   05/09/25 60.3 kg (133 lb)   03/13/25 58.2 kg (128 lb 3.2 oz)   01/14/25 58.1 kg (128 lb)   11/01/24 60.9 kg (134 lb 3.2 oz)   08/19/24 61.1 kg (134 lb 9.6 oz)   07/17/24 60.4 kg (133 lb 3.2 oz)   04/26/19 67.9 kg (149 lb 12.8 oz)   12/20/18 67.1 kg (148 lb)   12/05/18 66.7 kg (147 lb)     Estimated body mass index is 26.56 kg/m  as calculated from the following:    Height as of 5/14/25: 1.524 m (5').    Weight as of 5/14/25: 61.7 kg (136 lb).    Physical Exam:  General: Pleasant, well nourished and hydrated female in NAD.   Psych:  Mood is \"good,\" affect is appropriate.  Thought form and content are fluid " "and coherent.    HEENT: Eyes and sclera are clear. Extraocular movements are grossly intact without proptosis.    Neck: No masses or JVD are noted.    Resp: Easy and unlabored breathing.   Neuro: Alert and oriented, communicating clearly.   Ext: no swelling or edema.        Data:      Recent Labs   Lab Test 05/14/25  1044 01/14/25  1501 08/19/24  0951 07/17/24  0937 08/24/21  0733 08/24/21  0733 01/26/21  1715 01/26/21  1709 04/26/19  1105 04/24/18  1040 02/14/18  0938   A1C 9.8* 14.2*  --  >15.0*  --  11.4*   < > 12.0*  --    < >  --    TSH  --   --   --   --   --   --   --   --  2.86  --  3.41   LDL  --  154* 132* 104*   < > 63  --  Cannot estimate LDL when triglyceride exceeds 400 mg/dL  68  --    < >  --    HDL  --  35* 36* 29*  --  33*   < > 31*  --   --   --    TRIG  --  438* 706* 1,067*  --  274*   < > 721*  --   --   --    CR  --   --  0.56 0.49*  --  0.57   < > 0.60  --    < >  --    MICROL  --   --   --  711.0  --  513   < >  --   --   --   --    AST  --   --   --  20  --   --   --  13  --   --   --    ALT  --   --   --  33  --   --   --  21  --   --   --     < > = values in this interval not displayed.     No results found for: \"CPEPT\", \"GADAB\", \"ISCAB\"  Cholesterol   Date Value Ref Range Status   01/14/2025 254 (H) <200 mg/dL Final   08/19/2024 303 (H) <200 mg/dL Final   01/26/2021 188 <200 mg/dL Final   02/07/2018 193 <200 mg/dL Final       Hemoglobin   Date Value Ref Range Status   04/26/2019 9.1 (L) 11.7 - 15.7 g/dL Final   ]      Most recent GFRs:  GFR Estimate   Date Value Ref Range Status   08/19/2024 >90 >60 mL/min/1.73m2 Final     Comment:     eGFR calculated using 2021 CKD-EPI equation.   07/17/2024 >90 >60 mL/min/1.73m2 Final     Comment:     eGFR calculated using 2021 CKD-EPI equation.   08/24/2021 >90 >60 mL/min/1.73m2 Final     Comment:     As of July 11, 2021, eGFR is calculated by the CKD-EPI creatinine equation, without race adjustment. eGFR can be influenced by muscle mass, exercise, " "and diet. The reported eGFR is an estimation only and is only applicable if the renal function is stable.   01/26/2021 >90 >60 mL/min/[1.73_m2] Final     Comment:     Non  GFR Calc  Starting 12/18/2018, serum creatinine based estimated GFR (eGFR) will be   calculated using the Chronic Kidney Disease Epidemiology Collaboration   (CKD-EPI) equation.     12/20/2018 >90 >60 mL/min/[1.73_m2] Final     Comment:     Non  GFR Calc  Starting 12/18/2018, serum creatinine based estimated GFR (eGFR) will be   calculated using the Chronic Kidney Disease Epidemiology Collaboration   (CKD-EPI) equation.     04/24/2018 >90 >60 mL/min/1.7m2 Final     Comment:     Non  GFR Calc     Lab Results   Component Value Date    A1C 9.8 05/14/2025    A1C 14.2 01/14/2025    A1C >15.0 07/17/2024    A1C 11.4 08/24/2021    A1C 12.0 01/26/2021    A1C 11.7 12/20/2018    A1C 8.6 04/24/2018    A1C 11.3 02/07/2018    A1C 7.6 10/06/2013         No results found for: \"CPEPT\", \"GADAB\", \"ISCAB\"  AST   Date Value Ref Range Status   07/17/2024 20 0 - 45 U/L Final   01/26/2021 13 0 - 45 U/L Final     Lab Results   Component Value Date    ALT 33 07/17/2024    ALT 21 01/26/2021             Assessment/Plan:    Type 2 Diabetes with hyperglycemia, with retinopathy, nephropathy and neuropathy, with long-term use of insulin  Blood glucose control: Not at goal of A1C <7. A1C 9.8 on 5/14/2025, recent CGM data show blood glucose above goal.     We made the following plan today (instructions provided to patient):  CONTINUE Glargine Solostar u300 20 units once daily  DECREASE Novolog with meals, taking 12-15 units/meal and 6 units with overnight snack.  DECREASE Jardiance to 10 mg once daily  Take Ozempic 0.25 mg once weekly today,   then next Monday 7/14 INCREASE Ozempic to 0.5 mg once weekly. Continue this medication on Mondays.  Try to set alarms on your phone to remind you to take the glargine once daily insulin and the " Ozempic once weekly medication.    Diabetes Education referral sent.  Counseled to notify me if GI side effects are very bothersome with increased Ozempic dose.    Trial of lower Jardiance dose to see if better tolerated, if not would consider discontinuing.    Reviewed with patient the importance of taking medications regularly, and finding a regimen that she is comfortable taking daily. Plan to optimize Ozempic with titration as tolerated and decrease insulin as able. Patient would benefit from simplified regimen if Ozempic titration is tolerated.      DM Complications-   Retinopathy:  Yes, stable. Up to date with ophthalmology.   Nephropathy:  BP elevated today, has not taken lisinopril yet. Reminded to take when she returns home. +albuminuria.  Creatinine stable.   Neuropathy: Yes, gabapentin ordered last week by PCP.  Feet: OK, no ulcers or lesions.   Lipids: 40-74 yo and statin is reccomended. Patient is taking a statin.    Reminded to schedule check up with dentist.    2. Hyperlipidemia  Patient is taking a statin.  above goal 1/14/2025    3. CKD Stage 1  Significant albuminuria 5152 mg/dL 7/17/2024. On lisinopril. Creatinine stable.    Follow up 1 month.    45 minutes spent on the date of the encounter doing chart review, history and exam, documentation, education and counseling, as well as communication and coordination of care, and further activities as noted above.  This time excludes time spent reviewing CGM.        It is my privilege to be involved in the care of the above patient.     Kristin Ruiz, ANNA, CNP

## 2025-07-08 NOTE — PATIENT INSTRUCTIONS
Nice to see you today!    We made the following plan for medications:  CONTINUE Glargine Solostar u300 20 units once daily  DECREASE Novolog with meals, taking 12-15 units/meal and 6 units with overnight snack.      DECREASE Jardiance to 10 mg once daily  Take Ozempic 0.25 mg once weekly today,   then next Monday 7/14 INCREASE Ozempic to 0.5 mg once weekly. Continue this medication on Mondays.    Try to set alarms on your phone to remind you to take the glargine once daily insulin and the Ozempic once weekly medication.    Please schedule with Diabetes Education. They will contact you to schedule.  ----------------------------------------  -Hypoglycemia (low glucose):   If glucose is less than 70 mg/dL, treat with 15g carb (4 glucose tablets), recheck glucose in 15 minutes.  If low again, repeat.   If glucose is less than 54 mg/dL, treat with 30g carb, recheck glucose in 15 minutes.  If low again, repeat.           Contact information:   If you have concerns, please send me a [employer] message or call the clinic at [phone].  For more urgent concerns, please call [phone] after hours/weekends and ask to speak with the endocrinologist on call.      Please let me know if you are having low blood sugars less than 70 or over 350 mg/dL.  Do not wait until your next appointment if this is happening.

## 2025-07-08 NOTE — PROGRESS NOTES
Jesus Alberto Denson was evaluated in a specialty clinic today at which time her blood pressure was noted to be elevated.  She  has been advised to follow up with her primary provider or with a nurse only visit. We are also routing this message to her primary provider as an FYI.

## 2025-07-09 ENCOUNTER — PATIENT OUTREACH (OUTPATIENT)
Dept: CARE COORDINATION | Facility: CLINIC | Age: 50
End: 2025-07-09
Payer: COMMERCIAL

## 2025-07-13 ENCOUNTER — HEALTH MAINTENANCE LETTER (OUTPATIENT)
Age: 50
End: 2025-07-13

## 2025-08-05 ENCOUNTER — VIRTUAL VISIT (OUTPATIENT)
Dept: ENDOCRINOLOGY | Facility: CLINIC | Age: 50
End: 2025-08-05
Payer: COMMERCIAL

## 2025-08-05 DIAGNOSIS — E78.5 HYPERLIPIDEMIA WITH TARGET LDL LESS THAN 100: ICD-10-CM

## 2025-08-05 DIAGNOSIS — E11.3293 TYPE 2 DIABETES MELLITUS WITH BOTH EYES AFFECTED BY MILD NONPROLIFERATIVE RETINOPATHY WITHOUT MACULAR EDEMA, WITHOUT LONG-TERM CURRENT USE OF INSULIN (H): Primary | ICD-10-CM

## 2025-08-05 DIAGNOSIS — N18.1 CKD (CHRONIC KIDNEY DISEASE) STAGE 1, GFR 90 ML/MIN OR GREATER: ICD-10-CM

## 2025-08-05 PROCEDURE — 98006 SYNCH AUDIO-VIDEO EST MOD 30: CPT | Performed by: NURSE PRACTITIONER

## 2025-08-22 ENCOUNTER — MYC REFILL (OUTPATIENT)
Dept: ENDOCRINOLOGY | Facility: CLINIC | Age: 50
End: 2025-08-22
Payer: COMMERCIAL

## 2025-08-22 DIAGNOSIS — E11.40 TYPE 2 DIABETES MELLITUS WITH DIABETIC NEUROPATHY, WITHOUT LONG-TERM CURRENT USE OF INSULIN (H): ICD-10-CM

## 2025-08-24 ENCOUNTER — HEALTH MAINTENANCE LETTER (OUTPATIENT)
Age: 50
End: 2025-08-24

## 2025-08-25 RX ORDER — HYDROCHLOROTHIAZIDE 12.5 MG/1
CAPSULE ORAL
Qty: 6 EACH | Refills: 4 | Status: SHIPPED | OUTPATIENT
Start: 2025-08-25

## 2025-08-27 ENCOUNTER — TELEPHONE (OUTPATIENT)
Dept: PHARMACY | Facility: OTHER | Age: 50
End: 2025-08-27
Payer: COMMERCIAL